# Patient Record
Sex: MALE | Race: WHITE | HISPANIC OR LATINO | URBAN - METROPOLITAN AREA
[De-identification: names, ages, dates, MRNs, and addresses within clinical notes are randomized per-mention and may not be internally consistent; named-entity substitution may affect disease eponyms.]

---

## 2017-05-09 ENCOUNTER — APPOINTMENT (RX ONLY)
Dept: URBAN - METROPOLITAN AREA CLINIC 41 | Facility: CLINIC | Age: 67
Setting detail: DERMATOLOGY
End: 2017-05-09

## 2017-05-09 DIAGNOSIS — D485 NEOPLASM OF UNCERTAIN BEHAVIOR OF SKIN: ICD-10-CM

## 2017-05-09 DIAGNOSIS — Z85.820 PERSONAL HISTORY OF MALIGNANT MELANOMA OF SKIN: ICD-10-CM

## 2017-05-09 PROBLEM — D48.5 NEOPLASM OF UNCERTAIN BEHAVIOR OF SKIN: Status: ACTIVE | Noted: 2017-05-09

## 2017-05-09 PROCEDURE — 11100: CPT

## 2017-05-09 PROCEDURE — ? COUNSELING

## 2017-05-09 PROCEDURE — ? BIOPSY BY SHAVE METHOD

## 2017-05-09 PROCEDURE — 99214 OFFICE O/P EST MOD 30 MIN: CPT | Mod: 25

## 2017-05-09 ASSESSMENT — LOCATION SIMPLE DESCRIPTION DERM
LOCATION SIMPLE: LEFT TEMPLE
LOCATION SIMPLE: ABDOMEN

## 2017-05-09 ASSESSMENT — LOCATION DETAILED DESCRIPTION DERM
LOCATION DETAILED: LEFT CENTRAL TEMPLE
LOCATION DETAILED: RIGHT LATERAL ABDOMEN

## 2017-05-09 ASSESSMENT — LOCATION ZONE DERM
LOCATION ZONE: TRUNK
LOCATION ZONE: FACE

## 2017-05-09 NOTE — PROCEDURE: BIOPSY BY SHAVE METHOD
Silver Nitrate Text: The wound bed was treated with silver nitrate after the biopsy was performed.
Bill [id number] For Specimen Handling/Conveyance To Laboratory?: no
Post-Care Instructions: Post care instructions were reviewed.
Lab Facility: [id number]
X Size Of Lesion In Cm: 0
Anesthesia Type: 1% lidocaine with epinephrine
Wound Care: Vaseline
Consent: Verbal consent was obtained and risks were reviewed including but not limited to scarring, infection, bleeding, scabbing and incomplete removal.
Hemostasis: Electrocautery
Cryotherapy Text: The wound bed was treated with cryotherapy after the biopsy was performed.
Electrodesiccation And Curettage Text: The wound bed was treated with electrodesiccation and curettage after the biopsy was performed.
Render Post-Care Instructions In Note?: yes
Type Of Destruction Used: Curettage
Biopsy Type: H and E
Biopsy Method: Double edge Personna blades
Size Of Lesion In Cm: 0.9
Body Location Override (Optional - Billing Will Still Be Based On Selected Body Map Location If Applicable): Left temple
Lab: [id number]
Billing Type: Third-Party Bill
Dressing: bandage
Notification Instructions: Patient will be notified of biopsy results, but was instructed to call if not contacted within two weeks.
Electrodesiccation Text: The wound bed was treated with electrodesiccation after the biopsy was performed.
Detail Level: Detailed
Anesthesia Volume In Cc: 0.5

## 2018-05-14 ENCOUNTER — APPOINTMENT (RX ONLY)
Dept: URBAN - METROPOLITAN AREA CLINIC 41 | Facility: CLINIC | Age: 68
Setting detail: DERMATOLOGY
End: 2018-05-14

## 2018-05-14 DIAGNOSIS — L82.1 OTHER SEBORRHEIC KERATOSIS: ICD-10-CM

## 2018-05-14 DIAGNOSIS — Z85.820 PERSONAL HISTORY OF MALIGNANT MELANOMA OF SKIN: ICD-10-CM

## 2018-05-14 DIAGNOSIS — L57.0 ACTINIC KERATOSIS: ICD-10-CM

## 2018-05-14 DIAGNOSIS — Z85.828 PERSONAL HISTORY OF OTHER MALIGNANT NEOPLASM OF SKIN: ICD-10-CM

## 2018-05-14 PROCEDURE — ? LIQUID NITROGEN

## 2018-05-14 PROCEDURE — 99214 OFFICE O/P EST MOD 30 MIN: CPT | Mod: 25

## 2018-05-14 PROCEDURE — 17000 DESTRUCT PREMALG LESION: CPT

## 2018-05-14 PROCEDURE — ? COUNSELING

## 2018-05-14 ASSESSMENT — LOCATION SIMPLE DESCRIPTION DERM
LOCATION SIMPLE: CHEST
LOCATION SIMPLE: LEFT EAR
LOCATION SIMPLE: RIGHT TEMPLE
LOCATION SIMPLE: ABDOMEN
LOCATION SIMPLE: RIGHT FOREHEAD

## 2018-05-14 ASSESSMENT — LOCATION ZONE DERM
LOCATION ZONE: EAR
LOCATION ZONE: FACE
LOCATION ZONE: TRUNK

## 2018-05-14 ASSESSMENT — LOCATION DETAILED DESCRIPTION DERM
LOCATION DETAILED: RIGHT CENTRAL TEMPLE
LOCATION DETAILED: RIGHT SUPERIOR FOREHEAD
LOCATION DETAILED: RIGHT LATERAL ABDOMEN
LOCATION DETAILED: RIGHT MEDIAL INFERIOR CHEST
LOCATION DETAILED: LEFT INFERIOR HELIX

## 2018-05-14 NOTE — PROCEDURE: LIQUID NITROGEN
Number Of Freeze-Thaw Cycles: 2 freeze-thaw cycles
Post-Care Instructions: The treatment site will redden, and this will be followed quickly by swelling and blistering. The burning sensation usually subsides promptly, but the patient may experience tenderness as long as 3 days. The patient may take Aspirin, Ibuprofen or Tylenol if needed for discomfort. The patient need not limit activities except for strenuous exercise such as gym classes when the growths are on the feet. Keep the area clean, you may wash the area with soap and water. Bandaging is not necessary, but may be done. You may also puncture a large blister to relieve pressure. Some growths will not be eliminated by one treatment, and will require additional treatment.
Render Post-Care Instructions In Note?: no
Detail Level: Detailed
Consent: Patient's verbal consent is given. Discussed possibility of redness, swelling, blistering and pain. Discussed possibility of hyper and hypopigmentation. Patient is aware treatment failure is also a possibility. Advised return to clinic if not resolved in a month.
Duration Of Freeze Thaw-Cycle (Seconds): 5

## 2019-05-14 ENCOUNTER — APPOINTMENT (RX ONLY)
Dept: URBAN - METROPOLITAN AREA CLINIC 41 | Facility: CLINIC | Age: 69
Setting detail: DERMATOLOGY
End: 2019-05-14

## 2019-05-14 DIAGNOSIS — D18.0 HEMANGIOMA: ICD-10-CM

## 2019-05-14 DIAGNOSIS — L82.1 OTHER SEBORRHEIC KERATOSIS: ICD-10-CM

## 2019-05-14 DIAGNOSIS — L82.0 INFLAMED SEBORRHEIC KERATOSIS: ICD-10-CM

## 2019-05-14 DIAGNOSIS — Z85.828 PERSONAL HISTORY OF OTHER MALIGNANT NEOPLASM OF SKIN: ICD-10-CM

## 2019-05-14 DIAGNOSIS — Z85.820 PERSONAL HISTORY OF MALIGNANT MELANOMA OF SKIN: ICD-10-CM

## 2019-05-14 PROBLEM — D18.01 HEMANGIOMA OF SKIN AND SUBCUTANEOUS TISSUE: Status: ACTIVE | Noted: 2019-05-14

## 2019-05-14 PROCEDURE — ? COUNSELING

## 2019-05-14 PROCEDURE — 17110 DESTRUCTION B9 LES UP TO 14: CPT

## 2019-05-14 PROCEDURE — ? LIQUID NITROGEN

## 2019-05-14 PROCEDURE — 99214 OFFICE O/P EST MOD 30 MIN: CPT | Mod: 25

## 2019-05-14 ASSESSMENT — LOCATION SIMPLE DESCRIPTION DERM
LOCATION SIMPLE: RIGHT UPPER ARM
LOCATION SIMPLE: ABDOMEN
LOCATION SIMPLE: RIGHT TEMPLE
LOCATION SIMPLE: CHEST
LOCATION SIMPLE: LEFT CALF
LOCATION SIMPLE: RIGHT CALF

## 2019-05-14 ASSESSMENT — LOCATION ZONE DERM
LOCATION ZONE: TRUNK
LOCATION ZONE: FACE
LOCATION ZONE: LEG
LOCATION ZONE: ARM

## 2019-05-14 ASSESSMENT — LOCATION DETAILED DESCRIPTION DERM
LOCATION DETAILED: RIGHT PROXIMAL CALF
LOCATION DETAILED: RIGHT LATERAL PROXIMAL UPPER ARM
LOCATION DETAILED: LEFT DISTAL CALF
LOCATION DETAILED: RIGHT LATERAL ABDOMEN
LOCATION DETAILED: RIGHT MEDIAL INFERIOR CHEST
LOCATION DETAILED: RIGHT CENTRAL TEMPLE

## 2019-05-14 NOTE — PROCEDURE: LIQUID NITROGEN
Duration Of Freeze Thaw-Cycle (Seconds): 5-10
Render Note In Bullet Format When Appropriate: No
Include Z78.9 (Other Specified Conditions Influencing Health Status) As An Associated Diagnosis?: Yes
Number Of Freeze-Thaw Cycles: 2 freeze-thaw cycles
Consent: Patient gives verbal consent. Procedure and risks are reviewed including swelling, blistering, burning, blistering, hyper and hypopigmentation as well as possibility of treatment failure.
Post-Care Instructions: The treatment site will redden and this will be followed quickly by swelling and blistering. The burning sensation usually subsides promptly, although some tenderness of the area may last as long as three days. The patient may take aspirin, Tylenol or ibuprofen if needed for discomfort. The patient need not limit activities except for strenuous exercise when the growths are on the feet. Keep the area clean. You may wash the areas with soap and water. Bandaging is not necessary, but may be done. You may also puncture a large blister to relieve pressure. Advised RTC if not resolved in 1 month.
Medical Necessity Clause: The following procedure was performed due to:
Medical Necessity Information: It is in your best interest to select a reason for this procedure from the list below. All of these items fulfill various CMS LCD requirements except the new and changing color options.
Detail Level: Detailed

## 2019-08-13 ENCOUNTER — APPOINTMENT (RX ONLY)
Dept: URBAN - METROPOLITAN AREA CLINIC 41 | Facility: CLINIC | Age: 69
Setting detail: DERMATOLOGY
End: 2019-08-13

## 2019-08-13 DIAGNOSIS — D485 NEOPLASM OF UNCERTAIN BEHAVIOR OF SKIN: ICD-10-CM

## 2019-08-13 DIAGNOSIS — L82.0 INFLAMED SEBORRHEIC KERATOSIS: ICD-10-CM

## 2019-08-13 DIAGNOSIS — L82.1 OTHER SEBORRHEIC KERATOSIS: ICD-10-CM

## 2019-08-13 PROBLEM — D48.5 NEOPLASM OF UNCERTAIN BEHAVIOR OF SKIN: Status: ACTIVE | Noted: 2019-08-13

## 2019-08-13 PROCEDURE — ? LIQUID NITROGEN

## 2019-08-13 PROCEDURE — ? COUNSELING

## 2019-08-13 PROCEDURE — 17110 DESTRUCTION B9 LES UP TO 14: CPT

## 2019-08-13 PROCEDURE — 99213 OFFICE O/P EST LOW 20 MIN: CPT | Mod: 25

## 2019-08-13 PROCEDURE — ? OTHER

## 2019-08-13 ASSESSMENT — LOCATION ZONE DERM
LOCATION ZONE: EAR
LOCATION ZONE: ARM

## 2019-08-13 ASSESSMENT — LOCATION DETAILED DESCRIPTION DERM
LOCATION DETAILED: RIGHT DISTAL POSTERIOR UPPER ARM
LOCATION DETAILED: LEFT ANTERIOR PROXIMAL UPPER ARM
LOCATION DETAILED: RIGHT SUPERIOR CRUS OF ANTIHELIX

## 2019-08-13 ASSESSMENT — LOCATION SIMPLE DESCRIPTION DERM
LOCATION SIMPLE: LEFT UPPER ARM
LOCATION SIMPLE: RIGHT UPPER ARM
LOCATION SIMPLE: RIGHT EAR

## 2019-08-13 NOTE — PROCEDURE: OTHER
Detail Level: Detailed
Other (Free Text): Discussed if not resolved in one month return to clinic for possible biopsy to rule out SCCA
Note Text (......Xxx Chief Complaint.): This diagnosis correlates with the

## 2019-08-13 NOTE — PROCEDURE: LIQUID NITROGEN
Include Z78.9 (Other Specified Conditions Influencing Health Status) As An Associated Diagnosis?: Yes
Consent: Patient gives verbal consent. Procedure and risks are reviewed including swelling, blistering, burning, blistering, hyper and hypopigmentation as well as possibility of treatment failure.
Number Of Freeze-Thaw Cycles: 2 freeze-thaw cycles
Medical Necessity Information: It is in your best interest to select a reason for this procedure from the list below. All of these items fulfill various CMS LCD requirements except the new and changing color options.
Duration Of Freeze Thaw-Cycle (Seconds): 5-10
Post-Care Instructions: The treatment site will redden and this will be followed quickly by swelling and blistering. The burning sensation usually subsides promptly, although some tenderness of the area may last as long as three days. The patient may take aspirin, Tylenol or ibuprofen if needed for discomfort. The patient need not limit activities except for strenuous exercise when the growths are on the feet. Keep the area clean. You may wash the areas with soap and water. Bandaging is not necessary, but may be done. You may also puncture a large blister to relieve pressure. Advised RTC if not resolved in 1 month.
Add 52 Modifier (Optional): no
Medical Necessity Clause: The following procedure was performed due to:
Detail Level: Detailed

## 2019-09-17 ENCOUNTER — APPOINTMENT (RX ONLY)
Dept: URBAN - METROPOLITAN AREA CLINIC 41 | Facility: CLINIC | Age: 69
Setting detail: DERMATOLOGY
End: 2019-09-17

## 2019-09-17 DIAGNOSIS — L82.1 OTHER SEBORRHEIC KERATOSIS: ICD-10-CM

## 2019-09-17 PROCEDURE — 99213 OFFICE O/P EST LOW 20 MIN: CPT

## 2019-09-17 PROCEDURE — ? OTHER

## 2019-09-17 PROCEDURE — ? COUNSELING

## 2019-09-17 ASSESSMENT — LOCATION DETAILED DESCRIPTION DERM
LOCATION DETAILED: RIGHT MID TEMPLE
LOCATION DETAILED: RIGHT DISTAL POSTERIOR UPPER ARM

## 2019-09-17 ASSESSMENT — LOCATION ZONE DERM
LOCATION ZONE: ARM
LOCATION ZONE: FACE

## 2019-09-17 ASSESSMENT — LOCATION SIMPLE DESCRIPTION DERM
LOCATION SIMPLE: RIGHT TEMPLE
LOCATION SIMPLE: RIGHT UPPER ARM

## 2019-09-17 NOTE — PROCEDURE: OTHER
Detail Level: Detailed
Note Text (......Xxx Chief Complaint.): This diagnosis correlates with the
Other (Free Text): Treated with LN2

## 2020-04-22 ENCOUNTER — APPOINTMENT (RX ONLY)
Dept: URBAN - METROPOLITAN AREA CLINIC 41 | Facility: CLINIC | Age: 70
Setting detail: DERMATOLOGY
End: 2020-04-22

## 2020-04-22 DIAGNOSIS — L82.1 OTHER SEBORRHEIC KERATOSIS: ICD-10-CM

## 2020-04-22 DIAGNOSIS — Z85.820 PERSONAL HISTORY OF MALIGNANT MELANOMA OF SKIN: ICD-10-CM

## 2020-04-22 DIAGNOSIS — Z85.828 PERSONAL HISTORY OF OTHER MALIGNANT NEOPLASM OF SKIN: ICD-10-CM

## 2020-04-22 PROCEDURE — 99213 OFFICE O/P EST LOW 20 MIN: CPT | Mod: 95

## 2020-04-22 PROCEDURE — ? COUNSELING

## 2020-04-22 ASSESSMENT — LOCATION DETAILED DESCRIPTION DERM
LOCATION DETAILED: RIGHT LATERAL ABDOMEN
LOCATION DETAILED: LEFT RIB CAGE
LOCATION DETAILED: RIGHT CENTRAL TEMPLE

## 2020-04-22 ASSESSMENT — LOCATION ZONE DERM
LOCATION ZONE: FACE
LOCATION ZONE: TRUNK

## 2020-04-22 ASSESSMENT — LOCATION SIMPLE DESCRIPTION DERM
LOCATION SIMPLE: RIGHT TEMPLE
LOCATION SIMPLE: ABDOMEN

## 2020-10-28 ENCOUNTER — APPOINTMENT (RX ONLY)
Dept: URBAN - METROPOLITAN AREA CLINIC 41 | Facility: CLINIC | Age: 70
Setting detail: DERMATOLOGY
End: 2020-10-28

## 2020-10-28 DIAGNOSIS — Z85.820 PERSONAL HISTORY OF MALIGNANT MELANOMA OF SKIN: ICD-10-CM

## 2020-10-28 DIAGNOSIS — Z85.828 PERSONAL HISTORY OF OTHER MALIGNANT NEOPLASM OF SKIN: ICD-10-CM

## 2020-10-28 DIAGNOSIS — L82.1 OTHER SEBORRHEIC KERATOSIS: ICD-10-CM

## 2020-10-28 DIAGNOSIS — L57.0 ACTINIC KERATOSIS: ICD-10-CM

## 2020-10-28 DIAGNOSIS — Z71.89 OTHER SPECIFIED COUNSELING: ICD-10-CM

## 2020-10-28 PROCEDURE — ? COUNSELING

## 2020-10-28 PROCEDURE — ? LIQUID NITROGEN

## 2020-10-28 PROCEDURE — 99214 OFFICE O/P EST MOD 30 MIN: CPT | Mod: 25

## 2020-10-28 PROCEDURE — 17003 DESTRUCT PREMALG LES 2-14: CPT

## 2020-10-28 PROCEDURE — 17000 DESTRUCT PREMALG LESION: CPT

## 2020-10-28 ASSESSMENT — LOCATION DETAILED DESCRIPTION DERM
LOCATION DETAILED: LEFT DISTAL CALF
LOCATION DETAILED: RIGHT SCAPHA
LOCATION DETAILED: LEFT SUPERIOR PARIETAL SCALP
LOCATION DETAILED: RIGHT CENTRAL MALAR CHEEK
LOCATION DETAILED: RIGHT INFERIOR FOREHEAD
LOCATION DETAILED: RIGHT INFERIOR CENTRAL MALAR CHEEK
LOCATION DETAILED: LEFT LATERAL ABDOMEN
LOCATION DETAILED: RIGHT CENTRAL PARIETAL SCALP
LOCATION DETAILED: LEFT SUPERIOR HELIX
LOCATION DETAILED: RIGHT MEDIAL TEMPLE

## 2020-10-28 ASSESSMENT — LOCATION SIMPLE DESCRIPTION DERM
LOCATION SIMPLE: LEFT CALF
LOCATION SIMPLE: SCALP
LOCATION SIMPLE: RIGHT CHEEK
LOCATION SIMPLE: LEFT EAR
LOCATION SIMPLE: RIGHT TEMPLE
LOCATION SIMPLE: RIGHT FOREHEAD
LOCATION SIMPLE: RIGHT EAR
LOCATION SIMPLE: ABDOMEN

## 2020-10-28 ASSESSMENT — LOCATION ZONE DERM
LOCATION ZONE: TRUNK
LOCATION ZONE: EAR
LOCATION ZONE: LEG
LOCATION ZONE: SCALP
LOCATION ZONE: FACE

## 2020-10-28 NOTE — PROCEDURE: LIQUID NITROGEN
Detail Level: Detailed
Number Of Freeze-Thaw Cycles: 2 freeze-thaw cycles
Duration Of Freeze Thaw-Cycle (Seconds): 2
Render Post-Care Instructions In Note?: no
Post-Care Instructions: I reviewed with the patient in detail post-care instructions. Patient is to wear sunprotection, and avoid picking at any of the treated lesions. Pt may apply Vaseline to crusted or scabbing areas.
Consent: The patient's consent was obtained including but not limited to risks of crusting, scabbing, blistering, scarring, darker or lighter pigmentary change, recurrence, incomplete removal and infection.

## 2021-05-12 ENCOUNTER — APPOINTMENT (RX ONLY)
Dept: URBAN - METROPOLITAN AREA CLINIC 41 | Facility: CLINIC | Age: 71
Setting detail: DERMATOLOGY
End: 2021-05-12

## 2021-05-12 DIAGNOSIS — L57.0 ACTINIC KERATOSIS: ICD-10-CM

## 2021-05-12 DIAGNOSIS — L82.1 OTHER SEBORRHEIC KERATOSIS: ICD-10-CM

## 2021-05-12 DIAGNOSIS — Z85.820 PERSONAL HISTORY OF MALIGNANT MELANOMA OF SKIN: ICD-10-CM

## 2021-05-12 DIAGNOSIS — L90.5 SCAR CONDITIONS AND FIBROSIS OF SKIN: ICD-10-CM

## 2021-05-12 DIAGNOSIS — L57.8 OTHER SKIN CHANGES DUE TO CHRONIC EXPOSURE TO NONIONIZING RADIATION: ICD-10-CM

## 2021-05-12 PROCEDURE — 17003 DESTRUCT PREMALG LES 2-14: CPT

## 2021-05-12 PROCEDURE — 17000 DESTRUCT PREMALG LESION: CPT

## 2021-05-12 PROCEDURE — ? LIQUID NITROGEN

## 2021-05-12 PROCEDURE — ? COUNSELING

## 2021-05-12 PROCEDURE — 99213 OFFICE O/P EST LOW 20 MIN: CPT | Mod: 25

## 2021-05-12 ASSESSMENT — LOCATION ZONE DERM
LOCATION ZONE: TRUNK
LOCATION ZONE: TRUNK
LOCATION ZONE: EAR

## 2021-05-12 ASSESSMENT — LOCATION DETAILED DESCRIPTION DERM
LOCATION DETAILED: RIGHT TRIANGULAR FOSSA
LOCATION DETAILED: RIGHT SCAPHA
LOCATION DETAILED: PERIUMBILICAL SKIN
LOCATION DETAILED: SUPERIOR LUMBAR SPINE

## 2021-05-12 ASSESSMENT — LOCATION SIMPLE DESCRIPTION DERM
LOCATION SIMPLE: LOWER BACK
LOCATION SIMPLE: ABDOMEN
LOCATION SIMPLE: RIGHT EAR

## 2021-05-12 NOTE — PROCEDURE: LIQUID NITROGEN
Post-Care Instructions: I reviewed with the patient in detail post-care instructions. Patient is to wear sunprotection, and avoid picking at any of the treated lesions. Pt may apply Vaseline to crusted or scabbing areas.
Render Post-Care Instructions In Note?: no
Detail Level: Detailed
Consent: The patient's consent was obtained including but not limited to risks of crusting, scabbing, blistering, scarring, darker or lighter pigmentary change, recurrence, incomplete removal and infection.
Duration Of Freeze Thaw-Cycle (Seconds): 7
Number Of Freeze-Thaw Cycles: 1 freeze-thaw cycle

## 2021-11-10 ENCOUNTER — APPOINTMENT (RX ONLY)
Dept: URBAN - METROPOLITAN AREA CLINIC 41 | Facility: CLINIC | Age: 71
Setting detail: DERMATOLOGY
End: 2021-11-10

## 2021-11-10 DIAGNOSIS — L57.8 OTHER SKIN CHANGES DUE TO CHRONIC EXPOSURE TO NONIONIZING RADIATION: ICD-10-CM

## 2021-11-10 DIAGNOSIS — L82.1 OTHER SEBORRHEIC KERATOSIS: ICD-10-CM

## 2021-11-10 DIAGNOSIS — L57.0 ACTINIC KERATOSIS: ICD-10-CM

## 2021-11-10 DIAGNOSIS — L90.5 SCAR CONDITIONS AND FIBROSIS OF SKIN: ICD-10-CM

## 2021-11-10 DIAGNOSIS — Z85.820 PERSONAL HISTORY OF MALIGNANT MELANOMA OF SKIN: ICD-10-CM

## 2021-11-10 PROCEDURE — 17003 DESTRUCT PREMALG LES 2-14: CPT

## 2021-11-10 PROCEDURE — 17000 DESTRUCT PREMALG LESION: CPT

## 2021-11-10 PROCEDURE — ? LIQUID NITROGEN

## 2021-11-10 PROCEDURE — 99213 OFFICE O/P EST LOW 20 MIN: CPT | Mod: 25

## 2021-11-10 PROCEDURE — ? COUNSELING

## 2021-11-10 ASSESSMENT — LOCATION ZONE DERM
LOCATION ZONE: SCALP
LOCATION ZONE: EAR
LOCATION ZONE: TRUNK
LOCATION ZONE: FACE

## 2021-11-10 ASSESSMENT — LOCATION SIMPLE DESCRIPTION DERM
LOCATION SIMPLE: RIGHT CHEEK
LOCATION SIMPLE: SCALP
LOCATION SIMPLE: LEFT UPPER BACK
LOCATION SIMPLE: RIGHT EAR
LOCATION SIMPLE: LEFT EAR
LOCATION SIMPLE: LEFT FOREHEAD

## 2021-11-10 ASSESSMENT — LOCATION DETAILED DESCRIPTION DERM
LOCATION DETAILED: LEFT CENTRAL PARIETAL SCALP
LOCATION DETAILED: RIGHT SUPERIOR CENTRAL MALAR CHEEK
LOCATION DETAILED: RIGHT INFERIOR POSTERIOR HELIX
LOCATION DETAILED: RIGHT SUPERIOR PARIETAL SCALP
LOCATION DETAILED: LEFT FOREHEAD
LOCATION DETAILED: LEFT INFERIOR LATERAL UPPER BACK
LOCATION DETAILED: LEFT CYMBA CONCHA
LOCATION DETAILED: LEFT MEDIAL FOREHEAD

## 2021-11-10 NOTE — PROCEDURE: LIQUID NITROGEN
Consent: The patient's consent was obtained including but not limited to risks of crusting, scabbing, blistering, scarring, darker or lighter pigmentary change, recurrence, incomplete removal and infection.
Render Note In Bullet Format When Appropriate: No
Duration Of Freeze Thaw-Cycle (Seconds): 5
Application Tool (Optional): Liquid Nitrogen Sprayer
Post-Care Instructions: I reviewed with the patient in detail post-care instructions. Patient is to wear sunprotection, and avoid picking at any of the treated lesions. Pt may apply Vaseline to crusted or scabbing areas.
Show Aperture Variable?: Yes
Number Of Freeze-Thaw Cycles: 2 freeze-thaw cycles
Detail Level: Simple

## 2022-06-08 ENCOUNTER — APPOINTMENT (RX ONLY)
Dept: URBAN - METROPOLITAN AREA CLINIC 41 | Facility: CLINIC | Age: 72
Setting detail: DERMATOLOGY
End: 2022-06-08

## 2022-06-08 DIAGNOSIS — D485 NEOPLASM OF UNCERTAIN BEHAVIOR OF SKIN: ICD-10-CM

## 2022-06-08 PROBLEM — D23.39 OTHER BENIGN NEOPLASM OF SKIN OF OTHER PARTS OF FACE: Status: ACTIVE | Noted: 2022-06-08

## 2022-06-08 PROBLEM — D48.5 NEOPLASM OF UNCERTAIN BEHAVIOR OF SKIN: Status: ACTIVE | Noted: 2022-06-08

## 2022-06-08 PROCEDURE — 69100 BIOPSY OF EXTERNAL EAR: CPT

## 2022-06-08 PROCEDURE — ? COUNSELING

## 2022-06-08 PROCEDURE — 99212 OFFICE O/P EST SF 10 MIN: CPT | Mod: 25

## 2022-06-08 PROCEDURE — ? BIOPSY BY SHAVE METHOD

## 2022-06-08 ASSESSMENT — LOCATION DETAILED DESCRIPTION DERM: LOCATION DETAILED: RIGHT TRIANGULAR FOSSA

## 2022-06-08 ASSESSMENT — LOCATION ZONE DERM: LOCATION ZONE: EAR

## 2022-06-08 ASSESSMENT — LOCATION SIMPLE DESCRIPTION DERM: LOCATION SIMPLE: RIGHT EAR

## 2022-06-08 NOTE — PROCEDURE: COUNSELING
Topical Retinoids Recommendations: Helps with cell turnover and fine line and wrinkles
Detail Level: Zone
Sunscreen Recommendations: Continual photo protection with sun screen and photo protective clothing.

## 2022-06-08 NOTE — PROCEDURE: BIOPSY BY SHAVE METHOD
Detail Level: Detailed
Depth Of Biopsy: dermis
Was A Bandage Applied: Yes
Size Of Lesion In Cm: 0.3
X Size Of Lesion In Cm: 0
Biopsy Type: H and E
Biopsy Method: Dermablade
Anesthesia Type: 1% lidocaine with epinephrine and a 1:10 solution of 8.4% sodium bicarbonate
Anesthesia Volume In Cc: 0.5
Hemostasis: Nola's
Wound Care: Vaseline
Dressing: Band-Aid
Destruction After The Procedure: No
Type Of Destruction Used: Curettage
Curettage Text: The wound bed was treated with curettage after the biopsy was performed.
Cryotherapy Text: The wound bed was treated with cryotherapy after the biopsy was performed.
Electrodesiccation Text: The wound bed was treated with electrodesiccation after the biopsy was performed.
Electrodesiccation And Curettage Text: The wound bed was treated with electrodesiccation and curettage after the biopsy was performed.
Silver Nitrate Text: The wound bed was treated with silver nitrate after the biopsy was performed.
Lab: [id number]
Consent was obtained and risks were reviewed including but not limited to scarring, infection, bleeding, scabbing, incomplete removal, nerve damage and allergy to anesthesia.
Post-Care Instructions: I reviewed with the patient in detail post-care instructions. Patient is to keep the biopsy site dry overnight, and then apply bacitracin twice daily until healed. Patient may apply hydrogen peroxide soaks to remove any crusting.
Notification Instructions: Patient will be notified of biopsy results. However, patient instructed to call the office if not contacted within 2 weeks.
Billing Type: Third-Party Bill
Information: Selecting Yes will display possible errors in your note based on the variables you have selected. This validation is only offered as a suggestion for you. PLEASE NOTE THAT THE VALIDATION TEXT WILL BE REMOVED WHEN YOU FINALIZE YOUR NOTE. IF YOU WANT TO FAX A PRELIMINARY NOTE YOU WILL NEED TO TOGGLE THIS TO 'NO' IF YOU DO NOT WANT IT IN YOUR FAXED NOTE.

## 2022-08-17 ENCOUNTER — APPOINTMENT (RX ONLY)
Dept: URBAN - METROPOLITAN AREA CLINIC 41 | Facility: CLINIC | Age: 72
Setting detail: DERMATOLOGY
End: 2022-08-17

## 2022-08-17 ENCOUNTER — APPOINTMENT (RX ONLY)
Dept: URBAN - METROPOLITAN AREA CLINIC 9 | Facility: CLINIC | Age: 72
Setting detail: DERMATOLOGY
End: 2022-08-17

## 2022-08-17 VITALS
SYSTOLIC BLOOD PRESSURE: 130 MMHG | WEIGHT: 210 LBS | DIASTOLIC BLOOD PRESSURE: 75 MMHG | HEART RATE: 69 BPM | HEIGHT: 72 IN

## 2022-08-17 VITALS
HEART RATE: 60 BPM | DIASTOLIC BLOOD PRESSURE: 80 MMHG | WEIGHT: 210 LBS | SYSTOLIC BLOOD PRESSURE: 135 MMHG | HEIGHT: 72 IN

## 2022-08-17 PROBLEM — C44.222 SQUAMOUS CELL CARCINOMA OF SKIN OF RIGHT EAR AND EXTERNAL AURICULAR CANAL: Status: ACTIVE | Noted: 2022-08-17

## 2022-08-17 PROBLEM — C44.92 SQUAMOUS CELL CARCINOMA OF SKIN, UNSPECIFIED: Status: ACTIVE | Noted: 2022-08-17

## 2022-08-17 PROCEDURE — ? REPAIR NOTE

## 2022-08-17 PROCEDURE — ? MOHS SURGERY

## 2022-08-17 PROCEDURE — ? PATIENT SPECIFIC COUNSELING

## 2022-08-17 PROCEDURE — 15260 FTH/GFT FR N/E/E/L 20 SQCM/<: CPT

## 2022-08-17 PROCEDURE — ? COUNSELING

## 2022-08-17 PROCEDURE — ? CONSULTATION FOR MOHS SURGERY

## 2022-08-17 PROCEDURE — 99214 OFFICE O/P EST MOD 30 MIN: CPT | Mod: 25

## 2022-08-17 PROCEDURE — 17311 MOHS 1 STAGE H/N/HF/G: CPT

## 2022-08-17 PROCEDURE — 17312 MOHS ADDL STAGE: CPT

## 2022-08-17 NOTE — PROCEDURE: CONSULTATION FOR MOHS SURGERY
Detail Level: Detailed
Body Location Override (Optional - Billing Will Still Be Based On Selected Body Map Location If Applicable): right triangular fossa
Size Of Lesion: 1.3
X Size Of Lesion In Cm (Optional): 0.8
Name Of The Referring Provider For Procedure: NURA Jackson PA-C
Incorporate Mauc In Note: Yes

## 2022-08-17 NOTE — PROCEDURE: MOHS SURGERY
Body Location Override (Optional - Billing Will Still Be Based On Selected Body Map Location If Applicable): right triangular fossa
Patient Name (Optional- Will Render 'the Patient' If Blank): Francesco Reagan
Date Of Previous Biopsy (Optional): 6/8/22
Previous Accession (Optional): [id number]
Biopsy Photograph Reviewed: Yes
Referring Physician (Optional): NURA Jackson PA-C
Consent Type: Consent 1 (Standard)
Eye Shield Used: No
Surgeon Performing Repair (Optional): NURA Ladd MD
Initial Size Of Lesion: 1.3
X Size Of Lesion In Cm (Optional): 0.8
Number Of Stages: 2
Repair Type: Referred to ASC for closure
Oculoplastic Surgeon Procedure Text (A): After obtaining clear surgical margins the patient was sent to oculoplastics for surgical repair.  The patient understands they will receive post-surgical care and follow-up from the referring physician's office.
Otolaryngologist Procedure Text (A): After obtaining clear surgical margins the patient was sent to otolaryngology for surgical repair.  The patient understands they will receive post-surgical care and follow-up from the referring physician's office.
Plastic Surgeon (A): Dr. Stephanie Mistry
Plastic Surgeon Procedure Text (A): After obtaining clear surgical margins the patient was sent to plastics for surgical repair.  The patient understands they will receive post-surgical care and follow-up from the referring physician's office.
Mid-Level Procedure Text (A): After obtaining clear surgical margins the patient was sent to a mid-level provider for surgical repair.  The patient understands they will receive post-surgical care and follow-up from the mid-level provider.
Provider Procedure Text (A): After obtaining clear surgical margins the defect was repaired by another provider.
Asc Procedure Text (A): After obtaining clear surgical margins the patient was sent to an ASC for surgical repair.  The patient understands they will receive post-surgical care and follow-up from the ASC physician.
Simple / Intermediate / Complex Repair - Final Wound Length In Cm: 0
Suturegard Retention Suture: 2-0 Nylon
Retention Suture Bite Size: 3 mm
Length To Time In Minutes Device Was In Place: 10
Number Of Hemigard Strips Per Side: 1
Undermining Type: Entire Wound
Debridement Text: The wound edges were debrided prior to proceeding with the closure to facilitate wound healing.
Helical Rim Text: The closure involved the helical rim.
Vermilion Border Text: The closure involved the vermilion border.
Nostril Rim Text: The closure involved the nostril rim.
Retention Suture Text: Retention sutures were placed to support the closure and prevent dehiscence.
Area H Indication Text: Tumors in this location are included in Area H (eyelids, eyebrows, nose, lips, chin, ear, pre-auricular, post-auricular, temple, genitalia, hands, feet, ankles and areola).  Tissue conservation is critical in these anatomic locations.
Area M Indication Text: Tumors in this location are included in Area M (cheek, forehead, scalp, neck, jawline and pretibial skin).  Mohs surgery is indicated for tumors in these anatomic locations.
Area L Indication Text: Tumors in this location are included in Area L (trunk and extremities).  Mohs surgery is indicated for larger tumors, or tumors with aggressive histologic features, in these anatomic locations.
Tumor Debulked?: curette
Special Stains Stage 1 - Results: Base On Clearance Noted Above
Stage 2: Additional Anesthesia Type: 1% lidocaine with 1:200,000 epinephrine
Stage 3: Additional Anesthesia Type: 1% lidocaine with epinephrine
Staging Info: By selecting yes to the question above you will include information on AJCC 8 tumor staging in your Mohs note. Information on tumor staging will be automatically added for SCCs on the head and neck. AJCC 8 includes tumor size, tumor depth, perineural involvement and bone invasion.
Tumor Depth: Less than 6mm from granular layer and no invasion beyond the subcutaneous fat
Was The Patient On Physician Recommended Anticoagulation Therapy?: Please Select the Appropriate Response
Medical Necessity Statement: Based on my medical judgement, Mohs surgery is the most appropriate treatment for this cancer compared to other treatments.
Alternatives Discussed Intro (Do Not Add Period): I discussed alternative treatments to Mohs surgery and specifically discussed the risks and benefits of
Consent 1/Introductory Paragraph: Patient H&P reviewed and updated. Patient cleared for surgery.  The rationale for Mohs was explained to the patient and consent was obtained. The risks, benefits and alternatives to therapy were discussed in detail. Specifically, the risks of infection, scarring, bleeding, prolonged wound healing, incomplete removal, allergy to anesthesia, nerve injury and recurrence were addressed. Prior to the procedure, the treatment site was clearly identified and confirmed by the patient.
Consent 2/Introductory Paragraph: Mohs surgery was explained to the patient and consent was obtained. The risks, benefits and alternatives to therapy were discussed in detail. Specifically, the risks of infection, scarring, bleeding, prolonged wound healing, incomplete removal, allergy to anesthesia, nerve injury and recurrence were addressed. Prior to the procedure, the treatment site was clearly identified and confirmed by the patient. All components of Universal Protocol/PAUSE Rule completed.
Consent 3/Introductory Paragraph: I gave the patient a chance to ask questions they had about the procedure.  Following this I explained the Mohs procedure and consent was obtained. The risks, benefits and alternatives to therapy were discussed in detail. Specifically, the risks of infection, scarring, bleeding, prolonged wound healing, incomplete removal, allergy to anesthesia, nerve injury and recurrence were addressed. Prior to the procedure, the treatment site was clearly identified and confirmed by the patient. All components of Universal Protocol/PAUSE Rule completed.
Consent (Temporal Branch)/Introductory Paragraph: The rationale for Mohs was explained to the patient and consent was obtained. The risks, benefits and alternatives to therapy were discussed in detail. Specifically, the risks of damage to the temporal branch of the facial nerve, infection, scarring, bleeding, prolonged wound healing, incomplete removal, allergy to anesthesia, and recurrence were addressed. Prior to the procedure, the treatment site was clearly identified and confirmed by the patient. All components of Universal Protocol/PAUSE Rule completed.
Consent (Marginal Mandibular)/Introductory Paragraph: The rationale for Mohs was explained to the patient and consent was obtained. The risks, benefits and alternatives to therapy were discussed in detail. Specifically, the risks of damage to the marginal mandibular branch of the facial nerve, infection, scarring, bleeding, prolonged wound healing, incomplete removal, allergy to anesthesia, and recurrence were addressed. Prior to the procedure, the treatment site was clearly identified and confirmed by the patient. All components of Universal Protocol/PAUSE Rule completed.
Consent (Spinal Accessory)/Introductory Paragraph: The rationale for Mohs was explained to the patient and consent was obtained. The risks, benefits and alternatives to therapy were discussed in detail. Specifically, the risks of damage to the spinal accessory nerve, infection, scarring, bleeding, prolonged wound healing, incomplete removal, allergy to anesthesia, and recurrence were addressed. Prior to the procedure, the treatment site was clearly identified and confirmed by the patient. All components of Universal Protocol/PAUSE Rule completed.
Consent (Near Eyelid Margin)/Introductory Paragraph: The rationale for Mohs was explained to the patient and consent was obtained. The risks, benefits and alternatives to therapy were discussed in detail. Specifically, the risks of ectropion or eyelid deformity, infection, scarring, bleeding, prolonged wound healing, incomplete removal, allergy to anesthesia, nerve injury and recurrence were addressed. Prior to the procedure, the treatment site was clearly identified and confirmed by the patient. All components of Universal Protocol/PAUSE Rule completed.
Consent (Ear)/Introductory Paragraph: The rationale for Mohs was explained to the patient and consent was obtained. The risks, benefits and alternatives to therapy were discussed in detail. Specifically, the risks of ear deformity, infection, scarring, bleeding, prolonged wound healing, incomplete removal, allergy to anesthesia, nerve injury and recurrence were addressed. Prior to the procedure, the treatment site was clearly identified and confirmed by the patient. All components of Universal Protocol/PAUSE Rule completed.
Consent (Nose)/Introductory Paragraph: The rationale for Mohs was explained to the patient and consent was obtained. The risks, benefits and alternatives to therapy were discussed in detail. Specifically, the risks of nasal deformity, changes in the flow of air through the nose, infection, scarring, bleeding, prolonged wound healing, incomplete removal, allergy to anesthesia, nerve injury and recurrence were addressed. Prior to the procedure, the treatment site was clearly identified and confirmed by the patient. All components of Universal Protocol/PAUSE Rule completed.
Consent (Lip)/Introductory Paragraph: The rationale for Mohs was explained to the patient and consent was obtained. The risks, benefits and alternatives to therapy were discussed in detail. Specifically, the risks of lip deformity, changes in the oral aperture, infection, scarring, bleeding, prolonged wound healing, incomplete removal, allergy to anesthesia, nerve injury and recurrence were addressed. Prior to the procedure, the treatment site was clearly identified and confirmed by the patient. All components of Universal Protocol/PAUSE Rule completed.
Consent (Scalp)/Introductory Paragraph: The rationale for Mohs was explained to the patient and consent was obtained. The risks, benefits and alternatives to therapy were discussed in detail. Specifically, the risks of changes in hair growth pattern secondary to repair, infection, scarring, bleeding, prolonged wound healing, incomplete removal, allergy to anesthesia, nerve injury and recurrence were addressed. Prior to the procedure, the treatment site was clearly identified and confirmed by the patient. All components of Universal Protocol/PAUSE Rule completed.
Detail Level: Simple
Postop Diagnosis: same
Hemostasis: Electrocautery
Estimated Blood Loss (Cc): minimal
Brow Lift Text: A midfrontal incision was made medially to the defect to allow access to the tissues just superior to the left eyebrow. Following careful dissection inferiorly in a supraperiosteal plane to the level of the left eyebrow, several 3-0 monocryl sutures were used to resuspend the eyebrow orbicularis oculi muscular unit to the superior frontal bone periosteum. This resulted in an appropriate reapproximation of static eyebrow symmetry and correction of the left brow ptosis.
Deep Sutures: 5-0 Monocryl
Epidermal Closure: running
Suturegard Intro: Intraoperative tissue expansion was performed, utilizing the SUTUREGARD device, in order to reduce wound tension.
Suturegard Body: The suture ends were repeatedly re-tightened and re-clamped to achieve the desired tissue expansion.
Hemigard Intro: Due to skin fragility and wound tension, it was decided to use HEMIGARD adhesive retention suture devices to permit a linear closure. The skin was cleaned and dried for a 6cm distance away from the wound. Excessive hair, if present, was removed to allow for adhesion.
Hemigard Postcare Instructions: The HEMIGARD strips are to remain completely dry for at least 5-7 days.
Donor Site Anesthesia Type: same as repair anesthesia
Closure 2 Information: This tab is for additional flaps and grafts, including complex repair and grafts and complex repair and flaps. You can also specify a different location for the additional defect, if the location is the same you do not need to select a new one. We will insert the automated text for the repair you select below just as we do for solitary flaps and grafts. Please note that at this time if you select a location with a different insurance zone you will need to override the ICD10 and CPT if appropriate.
Closure 3 Information: This tab is for additional flaps and grafts above and beyond our usual structured repairs.  Please note if you enter information here it will not currently bill and you will need to add the billing information manually.
Unna Boot Text: An Unna boot was placed to help immobilize the limb and facilitate more rapid healing.
Home Suture Removal Text: Patient was provided instructions on removing sutures and will remove their sutures at home.  If they have any questions or difficulties they will call the office.
Post-Care Instructions: I reviewed with the patient in detail post-care instructions. Patient is not to engage in any heavy lifting, exercise, or swimming for the next 14 days. Should the patient develop any fevers, chills, bleeding, severe pain patient will contact the office immediately. Patient will follow up with referring physician within 6 months.
Pain Refusal Text: I offered to prescribe pain medication but the patient refused to take this medication.
Mauc Instructions: By selecting yes to the question below the MAUC number will be added into the note.  This will be calculated automatically based on the diagnosis chosen, the size entered, the body zone selected (H,M,L) and the specific indications you chose. You will also have the option to override the Mohs AUC if you disagree with the automatically calculated number and this option is found in the Case Summary tab.
Where Do You Want The Question To Include Opioid Counseling Located?: Case Summary Tab
Eye Protection Verbiage: Before proceeding with the stage, a plastic scleral shield was inserted. The globe was anesthetized with a few drops of 1% lidocaine with 1:100,000 epinephrine. Then, an appropriate sized scleral shield was chosen and coated with lacrilube ointment. The shield was gently inserted and left in place for the duration of each stage. After the stage was completed, the shield was gently removed.
Mohs Method Verbiage: An incision at a 45 degree angle following the standard Mohs approach was done and the specimen was harvested as a microscopic controlled layer.
Surgeon/Pathologist Verbiage (Will Incorporate Name Of Surgeon From Intro If Not Blank): operated in two distinct and integrated capacities as the surgeon and pathologist.
Mohs Histo Method Verbiage: Each section was then chromacoded and processed in the Mohs lab using the Mohs protocol and submitted for frozen section.
Subsequent Stages Histo Method Verbiage: Using a similar technique to that described above, a thin layer of tissue was removed from all areas where tumor was visible on the previous stage.  The tissue was again oriented, mapped, dyed, and processed as above.
Mohs Rapid Report Verbiage: The area of clinically evident tumor was marked with skin marking ink.  An incision using a #15-blade scalpel at a 45 degree angle following the standard Mohs approach was done and the specimen was harvested as a microscopic controlled layer. The specimen was taken to the lab, oriented and divided into the necessary number of pieces, chromacoded and submitted for horizontal frozen sections. The Mohs surgeon then examined the prepared microscopic sections. Any areas of residual tumor were indicated on the map. This was repeated in successive stages until a tumor free defect was achieved.
Complex Repair Preamble Text (Leave Blank If You Do Not Want): Extensive wide undermining was performed.
Intermediate Repair Preamble Text (Leave Blank If You Do Not Want): Undermining was performed with blunt dissection.
Non-Graft Cartilage Fenestration Text: The cartilage was fenestrated with a 2mm punch biopsy to help facilitate healing.
Graft Cartilage Fenestration Text: The cartilage was fenestrated with a 2mm punch biopsy to help facilitate graft survival and healing.
Secondary Intention Text (Leave Blank If You Do Not Want): The defect will heal with secondary intention.
No Repair - Repaired With Adjacent Surgical Defect Text (Leave Blank If You Do Not Want): After obtaining clear surgical margins the defect was repaired concurrently with another surgical defect which was in close approximation.
Adjacent Tissue Transfer Text: The defect edges were debeveled with a #15 scalpel blade.  Given the location of the defect and the proximity to free margins an adjacent tissue transfer was deemed most appropriate.  Using a sterile surgical marker, an appropriate flap was drawn incorporating the defect and placing the expected incisions within the relaxed skin tension lines where possible.    The area thus outlined was incised deep to adipose tissue with a #15 scalpel blade.  The skin margins were undermined to an appropriate distance in all directions utilizing iris scissors.
Advancement Flap (Single) Text: The defect edges were debeveled with a #15 scalpel blade.  Given the location of the defect and the proximity to free margins a single advancement flap was deemed most appropriate.  Using a sterile surgical marker, an appropriate advancement flap was drawn incorporating the defect and placing the expected incisions within the relaxed skin tension lines where possible.    The area thus outlined was incised deep to adipose tissue with a #15 scalpel blade.  The skin margins were undermined to an appropriate distance in all directions utilizing iris scissors.
Advancement Flap (Double) Text: The defect edges were debeveled with a #15 scalpel blade.  Given the location of the defect and the proximity to free margins a double advancement flap was deemed most appropriate.  Using a sterile surgical marker, the appropriate advancement flaps were drawn incorporating the defect and placing the expected incisions within the relaxed skin tension lines where possible.    The area thus outlined was incised deep to adipose tissue with a #15 scalpel blade.  The skin margins were undermined to an appropriate distance in all directions utilizing iris scissors.
Burow's Advancement Flap Text: The defect edges were debeveled with a #15 scalpel blade.  Given the location of the defect and the proximity to free margins a Burow's advancement flap was deemed most appropriate.  Using a sterile surgical marker, the appropriate advancement flap was drawn incorporating the defect and placing the expected incisions within the relaxed skin tension lines where possible.    The area thus outlined was incised deep to adipose tissue with a #15 scalpel blade.  The skin margins were undermined to an appropriate distance in all directions utilizing iris scissors.
Chonodrocutaneous Helical Advancement Flap Text: The defect edges were debeveled with a #15 scalpel blade.  Given the location of the defect and the proximity to free margins a chondrocutaneous helical advancement flap was deemed most appropriate.  Using a sterile surgical marker, the appropriate advancement flap was drawn incorporating the defect and placing the expected incisions within the relaxed skin tension lines where possible.    The area thus outlined was incised deep to adipose tissue with a #15 scalpel blade.  The skin margins were undermined to an appropriate distance in all directions utilizing iris scissors.
Crescentic Advancement Flap Text: The defect edges were debeveled with a #15 scalpel blade.  Given the location of the defect and the proximity to free margins a crescentic advancement flap was deemed most appropriate.  Using a sterile surgical marker, the appropriate advancement flap was drawn incorporating the defect and placing the expected incisions within the relaxed skin tension lines where possible.    The area thus outlined was incised deep to adipose tissue with a #15 scalpel blade.  The skin margins were undermined to an appropriate distance in all directions utilizing iris scissors.
A-T Advancement Flap Text: The defect edges were debeveled with a #15 scalpel blade.  Given the location of the defect, shape of the defect and the proximity to free margins an A-T advancement flap was deemed most appropriate.  Using a sterile surgical marker, an appropriate advancement flap was drawn incorporating the defect and placing the expected incisions within the relaxed skin tension lines where possible.    The area thus outlined was incised deep to adipose tissue with a #15 scalpel blade.  The skin margins were undermined to an appropriate distance in all directions utilizing iris scissors.
O-T Advancement Flap Text: The defect edges were debeveled with a #15 scalpel blade.  Given the location of the defect, shape of the defect and the proximity to free margins an O-T advancement flap was deemed most appropriate.  Using a sterile surgical marker, an appropriate advancement flap was drawn incorporating the defect and placing the expected incisions within the relaxed skin tension lines where possible.    The area thus outlined was incised deep to adipose tissue with a #15 scalpel blade.  The skin margins were undermined to an appropriate distance in all directions utilizing iris scissors.
O-L Flap Text: The defect edges were debeveled with a #15 scalpel blade.  Given the location of the defect, shape of the defect and the proximity to free margins an O-L flap was deemed most appropriate.  Using a sterile surgical marker, an appropriate advancement flap was drawn incorporating the defect and placing the expected incisions within the relaxed skin tension lines where possible.    The area thus outlined was incised deep to adipose tissue with a #15 scalpel blade.  The skin margins were undermined to an appropriate distance in all directions utilizing iris scissors.
O-Z Flap Text: The defect edges were debeveled with a #15 scalpel blade.  Given the location of the defect, shape of the defect and the proximity to free margins an O-Z flap was deemed most appropriate.  Using a sterile surgical marker, an appropriate transposition flap was drawn incorporating the defect and placing the expected incisions within the relaxed skin tension lines where possible. The area thus outlined was incised deep to adipose tissue with a #15 scalpel blade.  The skin margins were undermined to an appropriate distance in all directions utilizing iris scissors.
Double O-Z Flap Text: The defect edges were debeveled with a #15 scalpel blade.  Given the location of the defect, shape of the defect and the proximity to free margins a Double O-Z flap was deemed most appropriate.  Using a sterile surgical marker, an appropriate transposition flap was drawn incorporating the defect and placing the expected incisions within the relaxed skin tension lines where possible. The area thus outlined was incised deep to adipose tissue with a #15 scalpel blade.  The skin margins were undermined to an appropriate distance in all directions utilizing iris scissors.
V-Y Flap Text: The defect edges were debeveled with a #15 scalpel blade.  Given the location of the defect, shape of the defect and the proximity to free margins a V-Y flap was deemed most appropriate.  Using a sterile surgical marker, an appropriate advancement flap was drawn incorporating the defect and placing the expected incisions within the relaxed skin tension lines where possible.    The area thus outlined was incised deep to adipose tissue with a #15 scalpel blade.  The skin margins were undermined to an appropriate distance in all directions utilizing iris scissors.
Advancement-Rotation Flap Text: The defect edges were debeveled with a #15 scalpel blade.  Given the location of the defect, shape of the defect and the proximity to free margins an advancement-rotation flap was deemed most appropriate.  Using a sterile surgical marker, an appropriate flap was drawn incorporating the defect and placing the expected incisions within the relaxed skin tension lines where possible. The area thus outlined was incised deep to adipose tissue with a #15 scalpel blade.  The skin margins were undermined to an appropriate distance in all directions utilizing iris scissors.
Mercedes Flap Text: The defect edges were debeveled with a #15 scalpel blade.  Given the location of the defect, shape of the defect and the proximity to free margins a Mercedes flap was deemed most appropriate.  Using a sterile surgical marker, an appropriate advancement flap was drawn incorporating the defect and placing the expected incisions within the relaxed skin tension lines where possible. The area thus outlined was incised deep to adipose tissue with a #15 scalpel blade.  The skin margins were undermined to an appropriate distance in all directions utilizing iris scissors.
Modified Advancement Flap Text: The defect edges were debeveled with a #15 scalpel blade.  Given the location of the defect, shape of the defect and the proximity to free margins a modified advancement flap was deemed most appropriate.  Using a sterile surgical marker, an appropriate advancement flap was drawn incorporating the defect and placing the expected incisions within the relaxed skin tension lines where possible.    The area thus outlined was incised deep to adipose tissue with a #15 scalpel blade.  The skin margins were undermined to an appropriate distance in all directions utilizing iris scissors.
Mucosal Advancement Flap Text: Given the location of the defect, shape of the defect and the proximity to free margins a mucosal advancement flap was deemed most appropriate. Incisions were made with a 15 blade scalpel in the appropriate fashion along the cutaneous vermillion border and the mucosal lip. The remaining actinically damaged mucosal tissue was excised.  The mucosal advancement flap was then elevated to the gingival sulcus with care taken to preserve the neurovascular structures and advanced into the primary defect. Care was taken to ensure that precise realignment of the vermillion border was achieved.
Peng Advancement Flap Text: The defect edges were debeveled with a #15 scalpel blade.  Given the location of the defect, shape of the defect and the proximity to free margins a Peng advancement flap was deemed most appropriate.  Using a sterile surgical marker, an appropriate advancement flap was drawn incorporating the defect and placing the expected incisions within the relaxed skin tension lines where possible. The area thus outlined was incised deep to adipose tissue with a #15 scalpel blade.  The skin margins were undermined to an appropriate distance in all directions utilizing iris scissors.
Hatchet Flap Text: The defect edges were debeveled with a #15 scalpel blade.  Given the location of the defect, shape of the defect and the proximity to free margins a hatchet flap was deemed most appropriate.  Using a sterile surgical marker, an appropriate hatchet flap was drawn incorporating the defect and placing the expected incisions within the relaxed skin tension lines where possible.    The area thus outlined was incised deep to adipose tissue with a #15 scalpel blade.  The skin margins were undermined to an appropriate distance in all directions utilizing iris scissors.
Rotation Flap Text: The defect edges were debeveled with a #15 scalpel blade.  Given the location of the defect, shape of the defect and the proximity to free margins a rotation flap was deemed most appropriate.  Using a sterile surgical marker, an appropriate rotation flap was drawn incorporating the defect and placing the expected incisions within the relaxed skin tension lines where possible.    The area thus outlined was incised deep to adipose tissue with a #15 scalpel blade.  The skin margins were undermined to an appropriate distance in all directions utilizing iris scissors.
Spiral Flap Text: The defect edges were debeveled with a #15 scalpel blade.  Given the location of the defect, shape of the defect and the proximity to free margins a spiral flap was deemed most appropriate.  Using a sterile surgical marker, an appropriate rotation flap was drawn incorporating the defect and placing the expected incisions within the relaxed skin tension lines where possible. The area thus outlined was incised deep to adipose tissue with a #15 scalpel blade.  The skin margins were undermined to an appropriate distance in all directions utilizing iris scissors.
Staged Advancement Flap Text: The defect edges were debeveled with a #15 scalpel blade.  Given the location of the defect, shape of the defect and the proximity to free margins a staged advancement flap was deemed most appropriate.  Using a sterile surgical marker, an appropriate advancement flap was drawn incorporating the defect and placing the expected incisions within the relaxed skin tension lines where possible. The area thus outlined was incised deep to adipose tissue with a #15 scalpel blade.  The skin margins were undermined to an appropriate distance in all directions utilizing iris scissors.
Star Wedge Flap Text: The defect edges were debeveled with a #15 scalpel blade.  Given the location of the defect, shape of the defect and the proximity to free margins a star wedge flap was deemed most appropriate.  Using a sterile surgical marker, an appropriate rotation flap was drawn incorporating the defect and placing the expected incisions within the relaxed skin tension lines where possible. The area thus outlined was incised deep to adipose tissue with a #15 scalpel blade.  The skin margins were undermined to an appropriate distance in all directions utilizing iris scissors.
Transposition Flap Text: The defect edges were debeveled with a #15 scalpel blade.  Given the location of the defect and the proximity to free margins a transposition flap was deemed most appropriate.  Using a sterile surgical marker, an appropriate transposition flap was drawn incorporating the defect.    The area thus outlined was incised deep to adipose tissue with a #15 scalpel blade.  The skin margins were undermined to an appropriate distance in all directions utilizing iris scissors.
Muscle Hinge Flap Text: The defect edges were debeveled with a #15 scalpel blade.  Given the size, depth and location of the defect and the proximity to free margins a muscle hinge flap was deemed most appropriate.  Using a sterile surgical marker, an appropriate hinge flap was drawn incorporating the defect. The area thus outlined was incised with a #15 scalpel blade.  The skin margins were undermined to an appropriate distance in all directions utilizing iris scissors.
Mustarde Flap Text: The defect edges were debeveled with a #15 scalpel blade.  Given the size, depth and location of the defect and the proximity to free margins a Mustarde flap was deemed most appropriate.  Using a sterile surgical marker, an appropriate flap was drawn incorporating the defect. The area thus outlined was incised with a #15 scalpel blade.  The skin margins were undermined to an appropriate distance in all directions utilizing iris scissors.
Nasal Turnover Hinge Flap Text: The defect edges were debeveled with a #15 scalpel blade.  Given the size, depth, location of the defect and the defect being full thickness a nasal turnover hinge flap was deemed most appropriate.  Using a sterile surgical marker, an appropriate hinge flap was drawn incorporating the defect. The area thus outlined was incised with a #15 scalpel blade. The flap was designed to recreate the nasal mucosal lining and the alar rim. The skin margins were undermined to an appropriate distance in all directions utilizing iris scissors.
Nasalis-Muscle-Based Myocutaneous Island Pedicle Flap Text: Using a #15 blade, an incision was made around the donor flap to the level of the nasalis muscle. Wide lateral undermining was then performed in both the subcutaneous plane above the nasalis muscle, and in a submuscular plane just above periosteum. This allowed the formation of a free nasalis muscle axial pedicle (based on the angular artery) which was still attached to the actual cutaneous flap, increasing its mobility and vascular viability. Hemostasis was obtained with pinpoint electrocoagulation. The flap was mobilized into position and the pivotal anchor points positioned and stabilized with buried interrupted sutures. Subcutaneous and dermal tissues were closed in a multilayered fashion with sutures. Tissue redundancies were excised, and the epidermal edges were apposed without significant tension and sutured with sutures.
Orbicularis Oris Muscle Flap Text: The defect edges were debeveled with a #15 scalpel blade.  Given that the defect affected the competency of the oral sphincter an obicularis oris muscle flap was deemed most appropriate to restore this competency and normal muscle function.  Using a sterile surgical marker, an appropriate flap was drawn incorporating the defect. The area thus outlined was incised with a #15 scalpel blade.
Melolabial Transposition Flap Text: The defect edges were debeveled with a #15 scalpel blade.  Given the location of the defect and the proximity to free margins a melolabial flap was deemed most appropriate.  Using a sterile surgical marker, an appropriate melolabial transposition flap was drawn incorporating the defect.    The area thus outlined was incised deep to adipose tissue with a #15 scalpel blade.  The skin margins were undermined to an appropriate distance in all directions utilizing iris scissors.
Rhombic Flap Text: The defect edges were debeveled with a #15 scalpel blade.  Given the location of the defect and the proximity to free margins a rhombic flap was deemed most appropriate.  Using a sterile surgical marker, an appropriate rhombic flap was drawn incorporating the defect.    The area thus outlined was incised deep to adipose tissue with a #15 scalpel blade.  The skin margins were undermined to an appropriate distance in all directions utilizing iris scissors.
Rhomboid Transposition Flap Text: The defect edges were debeveled with a #15 scalpel blade.  Given the location of the defect and the proximity to free margins a rhomboid transposition flap was deemed most appropriate.  Using a sterile surgical marker, an appropriate rhomboid flap was drawn incorporating the defect.    The area thus outlined was incised deep to adipose tissue with a #15 scalpel blade.  The skin margins were undermined to an appropriate distance in all directions utilizing iris scissors.
Bi-Rhombic Flap Text: The defect edges were debeveled with a #15 scalpel blade.  Given the location of the defect and the proximity to free margins a bi-rhombic flap was deemed most appropriate.  Using a sterile surgical marker, an appropriate rhombic flap was drawn incorporating the defect. The area thus outlined was incised deep to adipose tissue with a #15 scalpel blade.  The skin margins were undermined to an appropriate distance in all directions utilizing iris scissors.
Helical Rim Advancement Flap Text: The defect edges were debeveled with a #15 blade scalpel.  Given the location of the defect and the proximity to free margins (helical rim) a double helical rim advancement flap was deemed most appropriate.  Using a sterile surgical marker, the appropriate advancement flaps were drawn incorporating the defect and placing the expected incisions between the helical rim and antihelix where possible.  The area thus outlined was incised through and through with a #15 scalpel blade.  With a skin hook and iris scissors, the flaps were gently and sharply undermined and freed up.
Bilateral Helical Rim Advancement Flap Text: The defect edges were debeveled with a #15 blade scalpel.  Given the location of the defect and the proximity to free margins (helical rim) a bilateral helical rim advancement flap was deemed most appropriate.  Using a sterile surgical marker, the appropriate advancement flaps were drawn incorporating the defect and placing the expected incisions between the helical rim and antihelix where possible.  The area thus outlined was incised through and through with a #15 scalpel blade.  With a skin hook and iris scissors, the flaps were gently and sharply undermined and freed up.
Ear Star Wedge Flap Text: The defect edges were debeveled with a #15 blade scalpel.  Given the location of the defect and the proximity to free margins (helical rim) an ear star wedge flap was deemed most appropriate.  Using a sterile surgical marker, the appropriate flap was drawn incorporating the defect and placing the expected incisions between the helical rim and antihelix where possible.  The area thus outlined was incised through and through with a #15 scalpel blade.
Banner Transposition Flap Text: The defect edges were debeveled with a #15 scalpel blade.  Given the location of the defect and the proximity to free margins a Banner transposition flap was deemed most appropriate.  Using a sterile surgical marker, an appropriate flap drawn around the defect. The area thus outlined was incised deep to adipose tissue with a #15 scalpel blade.  The skin margins were undermined to an appropriate distance in all directions utilizing iris scissors.
Bilobed Flap Text: The defect edges were debeveled with a #15 scalpel blade.  Given the location of the defect and the proximity to free margins a bilobe flap was deemed most appropriate.  Using a sterile surgical marker, an appropriate bilobe flap drawn around the defect.    The area thus outlined was incised deep to adipose tissue with a #15 scalpel blade.  The skin margins were undermined to an appropriate distance in all directions utilizing iris scissors.
Bilobed Transposition Flap Text: The defect edges were debeveled with a #15 scalpel blade.  Given the location of the defect and the proximity to free margins a bilobed transposition flap was deemed most appropriate.  Using a sterile surgical marker, an appropriate bilobe flap drawn around the defect.    The area thus outlined was incised deep to adipose tissue with a #15 scalpel blade.  The skin margins were undermined to an appropriate distance in all directions utilizing iris scissors.
Trilobed Flap Text: The defect edges were debeveled with a #15 scalpel blade.  Given the location of the defect and the proximity to free margins a trilobed flap was deemed most appropriate.  Using a sterile surgical marker, an appropriate trilobed flap drawn around the defect.    The area thus outlined was incised deep to adipose tissue with a #15 scalpel blade.  The skin margins were undermined to an appropriate distance in all directions utilizing iris scissors.
Dorsal Nasal Flap Text: The defect edges were debeveled with a #15 scalpel blade.  Given the location of the defect and the proximity to free margins a dorsal nasal flap was deemed most appropriate.  Using a sterile surgical marker, an appropriate dorsal nasal flap was drawn around the defect.    The area thus outlined was incised deep to adipose tissue with a #15 scalpel blade.  The skin margins were undermined to an appropriate distance in all directions utilizing iris scissors.
Island Pedicle Flap Text: The defect edges were debeveled with a #15 scalpel blade.  Given the location of the defect, shape of the defect and the proximity to free margins an island pedicle advancement flap was deemed most appropriate.  Using a sterile surgical marker, an appropriate advancement flap was drawn incorporating the defect, outlining the appropriate donor tissue and placing the expected incisions within the relaxed skin tension lines where possible.    The area thus outlined was incised deep to adipose tissue with a #15 scalpel blade.  The skin margins were undermined to an appropriate distance in all directions around the primary defect and laterally outward around the island pedicle utilizing iris scissors.  There was minimal undermining beneath the pedicle flap.
Island Pedicle Flap With Canthal Suspension Text: The defect edges were debeveled with a #15 scalpel blade.  Given the location of the defect, shape of the defect and the proximity to free margins an island pedicle advancement flap was deemed most appropriate.  Using a sterile surgical marker, an appropriate advancement flap was drawn incorporating the defect, outlining the appropriate donor tissue and placing the expected incisions within the relaxed skin tension lines where possible. The area thus outlined was incised deep to adipose tissue with a #15 scalpel blade.  The skin margins were undermined to an appropriate distance in all directions around the primary defect and laterally outward around the island pedicle utilizing iris scissors.  There was minimal undermining beneath the pedicle flap. A suspension suture was placed in the canthal tendon to prevent tension and prevent ectropion.
Alar Island Pedicle Flap Text: The defect edges were debeveled with a #15 scalpel blade.  Given the location of the defect, shape of the defect and the proximity to the alar rim an island pedicle advancement flap was deemed most appropriate.  Using a sterile surgical marker, an appropriate advancement flap was drawn incorporating the defect, outlining the appropriate donor tissue and placing the expected incisions within the nasal ala running parallel to the alar rim. The area thus outlined was incised with a #15 scalpel blade.  The skin margins were undermined minimally to an appropriate distance in all directions around the primary defect and laterally outward around the island pedicle utilizing iris scissors.  There was minimal undermining beneath the pedicle flap.
Double Island Pedicle Flap Text: The defect edges were debeveled with a #15 scalpel blade.  Given the location of the defect, shape of the defect and the proximity to free margins a double island pedicle advancement flap was deemed most appropriate.  Using a sterile surgical marker, an appropriate advancement flap was drawn incorporating the defect, outlining the appropriate donor tissue and placing the expected incisions within the relaxed skin tension lines where possible.    The area thus outlined was incised deep to adipose tissue with a #15 scalpel blade.  The skin margins were undermined to an appropriate distance in all directions around the primary defect and laterally outward around the island pedicle utilizing iris scissors.  There was minimal undermining beneath the pedicle flap.
Island Pedicle Flap-Requiring Vessel Identification Text: The defect edges were debeveled with a #15 scalpel blade.  Given the location of the defect, shape of the defect and the proximity to free margins an island pedicle advancement flap was deemed most appropriate.  Using a sterile surgical marker, an appropriate advancement flap was drawn, based on the axial vessel mentioned above, incorporating the defect, outlining the appropriate donor tissue and placing the expected incisions within the relaxed skin tension lines where possible.    The area thus outlined was incised deep to adipose tissue with a #15 scalpel blade.  The skin margins were undermined to an appropriate distance in all directions around the primary defect and laterally outward around the island pedicle utilizing iris scissors.  There was minimal undermining beneath the pedicle flap.
Keystone Flap Text: The defect edges were debeveled with a #15 scalpel blade.  Given the location of the defect, shape of the defect a keystone flap was deemed most appropriate.  Using a sterile surgical marker, an appropriate keystone flap was drawn incorporating the defect, outlining the appropriate donor tissue and placing the expected incisions within the relaxed skin tension lines where possible. The area thus outlined was incised deep to adipose tissue with a #15 scalpel blade.  The skin margins were undermined to an appropriate distance in all directions around the primary defect and laterally outward around the flap utilizing iris scissors.
O-T Plasty Text: The defect edges were debeveled with a #15 scalpel blade.  Given the location of the defect, shape of the defect and the proximity to free margins an O-T plasty was deemed most appropriate.  Using a sterile surgical marker, an appropriate O-T plasty was drawn incorporating the defect and placing the expected incisions within the relaxed skin tension lines where possible.    The area thus outlined was incised deep to adipose tissue with a #15 scalpel blade.  The skin margins were undermined to an appropriate distance in all directions utilizing iris scissors.
O-Z Plasty Text: The defect edges were debeveled with a #15 scalpel blade.  Given the location of the defect, shape of the defect and the proximity to free margins an O-Z plasty (double transposition flap) was deemed most appropriate.  Using a sterile surgical marker, the appropriate transposition flaps were drawn incorporating the defect and placing the expected incisions within the relaxed skin tension lines where possible.    The area thus outlined was incised deep to adipose tissue with a #15 scalpel blade.  The skin margins were undermined to an appropriate distance in all directions utilizing iris scissors.  Hemostasis was achieved with electrocautery.  The flaps were then transposed into place, one clockwise and the other counterclockwise, and anchored with interrupted buried subcutaneous sutures.
Double O-Z Plasty Text: The defect edges were debeveled with a #15 scalpel blade.  Given the location of the defect, shape of the defect and the proximity to free margins a Double O-Z plasty (double transposition flap) was deemed most appropriate.  Using a sterile surgical marker, the appropriate transposition flaps were drawn incorporating the defect and placing the expected incisions within the relaxed skin tension lines where possible. The area thus outlined was incised deep to adipose tissue with a #15 scalpel blade.  The skin margins were undermined to an appropriate distance in all directions utilizing iris scissors.  Hemostasis was achieved with electrocautery.  The flaps were then transposed into place, one clockwise and the other counterclockwise, and anchored with interrupted buried subcutaneous sutures.
V-Y Plasty Text: The defect edges were debeveled with a #15 scalpel blade.  Given the location of the defect, shape of the defect and the proximity to free margins an V-Y advancement flap was deemed most appropriate.  Using a sterile surgical marker, an appropriate advancement flap was drawn incorporating the defect and placing the expected incisions within the relaxed skin tension lines where possible.    The area thus outlined was incised deep to adipose tissue with a #15 scalpel blade.  The skin margins were undermined to an appropriate distance in all directions utilizing iris scissors.
H Plasty Text: Given the location of the defect, shape of the defect and the proximity to free margins a H-plasty was deemed most appropriate for repair.  Using a sterile surgical marker, the appropriate advancement arms of the H-plasty were drawn incorporating the defect and placing the expected incisions within the relaxed skin tension lines where possible. The area thus outlined was incised deep to adipose tissue with a #15 scalpel blade. The skin margins were undermined to an appropriate distance in all directions utilizing iris scissors.  The opposing advancement arms were then advanced into place in opposite direction and anchored with interrupted buried subcutaneous sutures.
W Plasty Text: The lesion was extirpated to the level of the fat with a #15 scalpel blade.  Given the location of the defect, shape of the defect and the proximity to free margins a W-plasty was deemed most appropriate for repair.  Using a sterile surgical marker, the appropriate transposition arms of the W-plasty were drawn incorporating the defect and placing the expected incisions within the relaxed skin tension lines where possible.    The area thus outlined was incised deep to adipose tissue with a #15 scalpel blade.  The skin margins were undermined to an appropriate distance in all directions utilizing iris scissors.  The opposing transposition arms were then transposed into place in opposite direction and anchored with interrupted buried subcutaneous sutures.
Z Plasty Text: The lesion was extirpated to the level of the fat with a #15 scalpel blade.  Given the location of the defect, shape of the defect and the proximity to free margins a Z-plasty was deemed most appropriate for repair.  Using a sterile surgical marker, the appropriate transposition arms of the Z-plasty were drawn incorporating the defect and placing the expected incisions within the relaxed skin tension lines where possible.    The area thus outlined was incised deep to adipose tissue with a #15 scalpel blade.  The skin margins were undermined to an appropriate distance in all directions utilizing iris scissors.  The opposing transposition arms were then transposed into place in opposite direction and anchored with interrupted buried subcutaneous sutures.
Zygomaticofacial Flap Text: Given the location of the defect, shape of the defect and the proximity to free margins a zygomaticofacial flap was deemed most appropriate for repair.  Using a sterile surgical marker, the appropriate flap was drawn incorporating the defect and placing the expected incisions within the relaxed skin tension lines where possible. The area thus outlined was incised deep to adipose tissue with a #15 scalpel blade with preservation of a vascular pedicle.  The skin margins were undermined to an appropriate distance in all directions utilizing iris scissors.  The flap was then placed into the defect and anchored with interrupted buried subcutaneous sutures.
Cheek Interpolation Flap Text: A decision was made to reconstruct the defect utilizing an interpolation axial flap and a staged reconstruction.  A telfa template was made of the defect.  This telfa template was then used to outline the Cheek Interpolation flap.  The donor area for the pedicle flap was then injected with anesthesia.  The flap was excised through the skin and subcutaneous tissue down to the layer of the underlying musculature.  The interpolation flap was carefully excised within this deep plane to maintain its blood supply.  The edges of the donor site were undermined.   The donor site was closed in a primary fashion.  The pedicle was then rotated into position and sutured.  Once the tube was sutured into place, adequate blood supply was confirmed with blanching and refill.  The pedicle was then wrapped with xeroform gauze and dressed appropriately with a telfa and gauze bandage to ensure continued blood supply and protect the attached pedicle.
Cheek-To-Nose Interpolation Flap Text: A decision was made to reconstruct the defect utilizing an interpolation axial flap and a staged reconstruction.  A telfa template was made of the defect.  This telfa template was then used to outline the Cheek-To-Nose Interpolation flap.  The donor area for the pedicle flap was then injected with anesthesia.  The flap was excised through the skin and subcutaneous tissue down to the layer of the underlying musculature.  The interpolation flap was carefully excised within this deep plane to maintain its blood supply.  The edges of the donor site were undermined.   The donor site was closed in a primary fashion.  The pedicle was then rotated into position and sutured.  Once the tube was sutured into place, adequate blood supply was confirmed with blanching and refill.  The pedicle was then wrapped with xeroform gauze and dressed appropriately with a telfa and gauze bandage to ensure continued blood supply and protect the attached pedicle.
Interpolation Flap Text: A decision was made to reconstruct the defect utilizing an interpolation axial flap and a staged reconstruction.  A telfa template was made of the defect.  This telfa template was then used to outline the interpolation flap.  The donor area for the pedicle flap was then injected with anesthesia.  The flap was excised through the skin and subcutaneous tissue down to the layer of the underlying musculature.  The interpolation flap was carefully excised within this deep plane to maintain its blood supply.  The edges of the donor site were undermined.   The donor site was closed in a primary fashion.  The pedicle was then rotated into position and sutured.  Once the tube was sutured into place, adequate blood supply was confirmed with blanching and refill.  The pedicle was then wrapped with xeroform gauze and dressed appropriately with a telfa and gauze bandage to ensure continued blood supply and protect the attached pedicle.
Melolabial Interpolation Flap Text: A decision was made to reconstruct the defect utilizing an interpolation axial flap and a staged reconstruction.  A telfa template was made of the defect.  This telfa template was then used to outline the melolabial interpolation flap.  The donor area for the pedicle flap was then injected with anesthesia.  The flap was excised through the skin and subcutaneous tissue down to the layer of the underlying musculature.  The pedicle flap was carefully excised within this deep plane to maintain its blood supply.  The edges of the donor site were undermined.   The donor site was closed in a primary fashion.  The pedicle was then rotated into position and sutured.  Once the tube was sutured into place, adequate blood supply was confirmed with blanching and refill.  The pedicle was then wrapped with xeroform gauze and dressed appropriately with a telfa and gauze bandage to ensure continued blood supply and protect the attached pedicle.
Mastoid Interpolation Flap Text: A decision was made to reconstruct the defect utilizing an interpolation axial flap and a staged reconstruction.  A telfa template was made of the defect.  This telfa template was then used to outline the mastoid interpolation flap.  The donor area for the pedicle flap was then injected with anesthesia.  The flap was excised through the skin and subcutaneous tissue down to the layer of the underlying musculature.  The pedicle flap was carefully excised within this deep plane to maintain its blood supply.  The edges of the donor site were undermined.   The donor site was closed in a primary fashion.  The pedicle was then rotated into position and sutured.  Once the tube was sutured into place, adequate blood supply was confirmed with blanching and refill.  The pedicle was then wrapped with xeroform gauze and dressed appropriately with a telfa and gauze bandage to ensure continued blood supply and protect the attached pedicle.
Posterior Auricular Interpolation Flap Text: A decision was made to reconstruct the defect utilizing an interpolation axial flap and a staged reconstruction.  A telfa template was made of the defect.  This telfa template was then used to outline the posterior auricular interpolation flap.  The donor area for the pedicle flap was then injected with anesthesia.  The flap was excised through the skin and subcutaneous tissue down to the layer of the underlying musculature.  The pedicle flap was carefully excised within this deep plane to maintain its blood supply.  The edges of the donor site were undermined.   The donor site was closed in a primary fashion.  The pedicle was then rotated into position and sutured.  Once the tube was sutured into place, adequate blood supply was confirmed with blanching and refill.  The pedicle was then wrapped with xeroform gauze and dressed appropriately with a telfa and gauze bandage to ensure continued blood supply and protect the attached pedicle.
Paramedian Forehead Flap Text: A decision was made to reconstruct the defect utilizing an interpolation axial flap and a staged reconstruction.  A telfa template was made of the defect.  This telfa template was then used to outline the paramedian forehead pedicle flap.  The donor area for the pedicle flap was then injected with anesthesia.  The flap was excised through the skin and subcutaneous tissue down to the layer of the underlying musculature.  The pedicle flap was carefully excised within this deep plane to maintain its blood supply.  The edges of the donor site were undermined.   The donor site was closed in a primary fashion.  The pedicle was then rotated into position and sutured.  Once the tube was sutured into place, adequate blood supply was confirmed with blanching and refill.  The pedicle was then wrapped with xeroform gauze and dressed appropriately with a telfa and gauze bandage to ensure continued blood supply and protect the attached pedicle.
Abbe Flap (Upper To Lower Lip) Text: The defect of the lower lip was assessed and measured.  Given the location and size of the defect, an Abbe flap was deemed most appropriate.  Using a sterile surgical marker, an appropriate Abbe flap was measured and drawn on the upper lip. Local anesthesia was then infiltrated.  A scalpel was then used to incise the upper lip through and through the skin, vermilion, muscle and mucosa, leaving the flap pedicled on the opposite side.  The flap was then rotated and transferred to the lower lip defect.  The flap was then sutured into place with a three layer technique, closing the orbicularis oris muscle layer with subcutaneous buried sutures, followed by a mucosal layer and an epidermal layer.
Abbe Flap (Lower To Upper Lip) Text: The defect of the upper lip was assessed and measured.  Given the location and size of the defect, an Abbe flap was deemed most appropriate.  Using a sterile surgical marker, an appropriate Abbe flap was measured and drawn on the lower lip. Local anesthesia was then infiltrated. A scalpel was then used to incise the upper lip through and through the skin, vermilion, muscle and mucosa, leaving the flap pedicled on the opposite side.  The flap was then rotated and transferred to the lower lip defect.  The flap was then sutured into place with a three layer technique, closing the orbicularis oris muscle layer with subcutaneous buried sutures, followed by a mucosal layer and an epidermal layer.
Estlander Flap (Upper To Lower Lip) Text: The defect of the lower lip was assessed and measured.  Given the location and size of the defect, an Estlander flap was deemed most appropriate.  Using a sterile surgical marker, an appropriate Estlander flap was measured and drawn on the upper lip. Local anesthesia was then infiltrated. A scalpel was then used to incise the lateral aspect of the flap, through skin, muscle and mucosa, leaving the flap pedicled medially.  The flap was then rotated and positioned to fill the lower lip defect.  The flap was then sutured into place with a three layer technique, closing the orbicularis oris muscle layer with subcutaneous buried sutures, followed by a mucosal layer and an epidermal layer.
Cheiloplasty (Less Than 50%) Text: A decision was made to reconstruct the defect with a  cheiloplasty.  The defect was undermined extensively.  Additional obicularis oris muscle was excised with a 15 blade scalpel.  The defect was converted into a full thickness wedge, of less than 50% of the vertical height of the lip, to facilite a better cosmetic result.  Small vessels were then tied off with 5-0 monocyrl. The obicularis oris, superficial fascia, adipose and dermis were then reapproximated.  After the deeper layers were approximated the epidermis was reapproximated with particular care given to realign the vermillion border.
Cheiloplasty (Complex) Text: A decision was made to reconstruct the defect with a  cheiloplasty.  The defect was undermined extensively.  Additional obicularis oris muscle was excised with a 15 blade scalpel.  The defect was converted into a full thickness wedge to facilite a better cosmetic result.  Small vessels were then tied off with 5-0 monocyrl. The obicularis oris, superficial fascia, adipose and dermis were then reapproximated.  After the deeper layers were approximated the epidermis was reapproximated with particular care given to realign the vermillion border.
Ear Wedge Repair Text: A wedge excision was completed by carrying down an excision through the full thickness of the ear and cartilage with an inward facing Burow's triangle. The wound was then closed in a layered fashion.
Full Thickness Lip Wedge Repair (Flap) Text: Given the location of the defect and the proximity to free margins a full thickness wedge repair was deemed most appropriate.  Using a sterile surgical marker, the appropriate repair was drawn incorporating the defect and placing the expected incisions perpendicular to the vermillion border.  The vermillion border was also meticulously outlined to ensure appropriate reapproximation during the repair.  The area thus outlined was incised through and through with a #15 scalpel blade.  The muscularis and dermis were reaproximated with deep sutures following hemostasis. Care was taken to realign the vermillion border before proceeding with the superficial closure.  Once the vermillion was realigned the superfical and mucosal closure was finished.
Ftsg Text: The defect edges were debeveled with a #15 scalpel blade.  Given the location of the defect, shape of the defect and the proximity to free margins a full thickness skin graft was deemed most appropriate.  Using a sterile surgical marker, the primary defect shape was transferred to the donor site. The area thus outlined was incised deep to adipose tissue with a #15 scalpel blade.  The harvested graft was then trimmed of adipose tissue until only dermis and epidermis was left.  The skin margins of the secondary defect were undermined to an appropriate distance in all directions utilizing iris scissors.  The secondary defect was closed with interrupted buried subcutaneous sutures.  The skin edges were then re-apposed with running  sutures.  The skin graft was then placed in the primary defect and oriented appropriately.
Split-Thickness Skin Graft Text: The defect edges were debeveled with a #15 scalpel blade.  Given the location of the defect, shape of the defect and the proximity to free margins a split thickness skin graft was deemed most appropriate.  Using a sterile surgical marker, the primary defect shape was transferred to the donor site. The split thickness graft was then harvested.  The skin graft was then placed in the primary defect and oriented appropriately.
Burow's Graft Text: The defect edges were debeveled with a #15 scalpel blade.  Given the location of the defect, shape of the defect, the proximity to free margins and the presence of a standing cone deformity a Burow's skin graft was deemed most appropriate. The standing cone was removed and this tissue was then trimmed to the shape of the primary defect. The adipose tissue was also removed until only dermis and epidermis were left.  The skin margins of the secondary defect were undermined to an appropriate distance in all directions utilizing iris scissors.  The secondary defect was closed with interrupted buried subcutaneous sutures.  The skin edges were then re-apposed with running  sutures.  The skin graft was then placed in the primary defect and oriented appropriately.
Cartilage Graft Text: The defect edges were debeveled with a #15 scalpel blade.  Given the location of the defect, shape of the defect, the fact the defect involved a full thickness cartilage defect a cartilage graft was deemed most appropriate.  An appropriate donor site was identified, cleansed, and anesthetized. The cartilage graft was then harvested and transferred to the recipient site, oriented appropriately and then sutured into place.  The secondary defect was then repaired using a primary closure.
Composite Graft Text: The defect edges were debeveled with a #15 scalpel blade.  Given the location of the defect, shape of the defect, the proximity to free margins and the fact the defect was full thickness a composite graft was deemed most appropriate.  The defect was outline and then transferred to the donor site.  A full thickness graft was then excised from the donor site. The graft was then placed in the primary defect, oriented appropriately and then sutured into place.  The secondary defect was then repaired using a primary closure.
Epidermal Autograft Text: The defect edges were debeveled with a #15 scalpel blade.  Given the location of the defect, shape of the defect and the proximity to free margins an epidermal autograft was deemed most appropriate.  Using a sterile surgical marker, the primary defect shape was transferred to the donor site. The epidermal graft was then harvested.  The skin graft was then placed in the primary defect and oriented appropriately.
Dermal Autograft Text: The defect edges were debeveled with a #15 scalpel blade.  Given the location of the defect, shape of the defect and the proximity to free margins a dermal autograft was deemed most appropriate.  Using a sterile surgical marker, the primary defect shape was transferred to the donor site. The area thus outlined was incised deep to adipose tissue with a #15 scalpel blade.  The harvested graft was then trimmed of adipose and epidermal tissue until only dermis was left.  The skin graft was then placed in the primary defect and oriented appropriately.
Skin Substitute Text: The defect edges were debeveled with a #15 scalpel blade.  Given the location of the defect, shape of the defect and the proximity to free margins a skin substitute graft was deemed most appropriate.  The graft material was trimmed to fit the size of the defect. The graft was then placed in the primary defect and oriented appropriately.
Tissue Cultured Epidermal Autograft Text: The defect edges were debeveled with a #15 scalpel blade.  Given the location of the defect, shape of the defect and the proximity to free margins a tissue cultured epidermal autograft was deemed most appropriate.  The graft was then trimmed to fit the size of the defect.  The graft was then placed in the primary defect and oriented appropriately.
Xenograft Text: The defect edges were debeveled with a #15 scalpel blade.  Given the location of the defect, shape of the defect and the proximity to free margins a xenograft was deemed most appropriate.  The graft was then trimmed to fit the size of the defect.  The graft was then placed in the primary defect and oriented appropriately.
Purse String (Simple) Text: Given the location of the defect and the characteristics of the surrounding skin a pursestring closure was deemed most appropriate.  Undermining was performed circumfirentially around the surgical defect.  A purstring suture was then placed and tightened.
Purse String (Intermediate) Text: Given the location of the defect and the characteristics of the surrounding skin a pursestring intermediate closure was deemed most appropriate.  Undermining was performed circumfirentially around the surgical defect.  A purstring suture was then placed and tightened.
Partial Purse String (Simple) Text: Given the location of the defect and the characteristics of the surrounding skin a simple purse string closure was deemed most appropriate.  Undermining was performed circumfirentially around the surgical defect.  A purse string suture was then placed and tightened. Wound tension only allowed a partial closure of the circular defect.
Partial Purse String (Intermediate) Text: Given the location of the defect and the characteristics of the surrounding skin an intermediate purse string closure was deemed most appropriate.  Undermining was performed circumfirentially around the surgical defect.  A purse string suture was then placed and tightened. Wound tension only allowed a partial closure of the circular defect.
Localized Dermabrasion With Wire Brush Text: The patient was draped in routine manner.  Localized dermabrasion using 3 x 17 mm wire brush was performed in routine manner to papillary dermis. This spot dermabrasion is being performed to complete skin cancer reconstruction. It also will eliminate the other sun damaged precancerous cells that are known to be part of the regional effect of a lifetime's worth of sun exposure. This localized dermabrasion is therapeutic and should not be considered cosmetic in any regard.
Tarsorrhaphy Text: A tarsorrhaphy was performed using Frost sutures.
Complex Repair And Flap Additional Text (Will Appearing After The Standard Complex Repair Text): The complex repair was not sufficient to completely close the primary defect. The remaining additional defect was repaired with the flap mentioned below.
Complex Repair And Graft Additional Text (Will Appearing After The Standard Complex Repair Text): The complex repair was not sufficient to completely close the primary defect. The remaining additional defect was repaired with the graft mentioned below.
Manual Repair Warning Statement: We plan on removing the manually selected variable below in favor of our much easier automatic structured text blocks found in the previous tab. We decided to do this to help make the flow better and give you the full power of structured data. Manual selection is never going to be ideal in our platform and I would encourage you to avoid using manual selection from this point on, especially since I will be sunsetting this feature. It is important that you do one of two things with the customized text below. First, you can save all of the text in a word file so you can have it for future reference. Second, transfer the text to the appropriate area in the Library tab. Lastly, if there is a flap or graft type which we do not have you need to let us know right away so I can add it in before the variable is hidden. No need to panic, we plan to give you roughly 6 months to make the change.
Same Histology In Subsequent Stages Text: The pattern and morphology of the tumor is as described in the first stage.
No Residual Tumor Seen Histology Text: There were no malignant cells seen in the sections examined.
Inflammation Suggestive Of Cancer Camouflage Histology Text: There was a dense lymphocytic infiltrate which prevented adequate histologic evaluation of adjacent structures.
Bcc Histology Text: There were numerous aggregates of basaloid cells.
Bcc Infiltrative Histology Text: There were numerous aggregates of basaloid cells demonstrating an infiltrative pattern.
Mart-1 - Positive Histology Text: MART-1 staining demonstrates areas of higher density and clustering of melanocytes with Pagetoid spread upwards within the epidermis. The surgical margins are positive for tumor cells.
Mart-1 - Negative Histology Text: MART-1 staining demonstrates a normal density and pattern of melanocytes along the dermal-epidermal junction. The surgical margins are negative for tumor cells.
Information: Selecting Yes will display possible errors in your note based on the variables you have selected. This validation is only offered as a suggestion for you. PLEASE NOTE THAT THE VALIDATION TEXT WILL BE REMOVED WHEN YOU FINALIZE YOUR NOTE. IF YOU WANT TO FAX A PRELIMINARY NOTE YOU WILL NEED TO TOGGLE THIS TO 'NO' IF YOU DO NOT WANT IT IN YOUR FAXED NOTE.

## 2022-08-17 NOTE — HPI: MOHS SURGERY CONSULTATION
Has The Cancer Been Biopsied Before?: has been previously biopsied
Who Is Your Referring Provider?: ANDREEA Salas
When Was Your Biopsy?: 6/8/22
Accession (Optional): [id number]

## 2022-08-17 NOTE — PROCEDURE: REPAIR NOTE
Body Location Override (Optional - Billing Will Still Be Based On Selected Body Map Location If Applicable): right triangular fossa
Referring Provider (Optional): NURA Jackson PA-C
Anesthesia Volume In Cc: 2
Did You Provide Opioid Counseling: No
Repair Type: Graft
Suturegard Retention Suture: 2-0 Nylon
Retention Suture Bite Size: 3 mm
Length To Time In Minutes Device Was In Place: 10
Number Of Hemigard Strips Per Side: 1
Simple / Intermediate / Complex Repair - Final Wound Length In Cm: 0
Undermining Type: Entire Wound
Debridement Text: The wound edges were debrided prior to proceeding with the closure to facilitate wound healing.
Helical Rim Text: The closure involved the helical rim.
Vermilion Border Text: The closure involved the vermilion border.
Nostril Rim Text: The closure involved the nostril rim.
Retention Suture Text: Retention sutures were placed to support the closure and prevent dehiscence.
Graft Type: Full Thickness Skin Graft
Graft Donor Site: right post auricular
Primary Defect Width (In Cm): 1.3
Skin Substitute: EpiFix Micronized
Include The Following Details In The Note (If No Details Will Only Be Reflected In The Surgical Fax): Yes
Detail Level: Detailed
Fellow/Resident (Optional): Closure Pack Number: 
Anesthesia Type: 1% lidocaine with 1:100,000 epinephrine
Hemostasis: Electrocautery
Estimated Blood Loss (Cc): minimal
Brow Lift Text: A midfrontal incision was made medially to the defect to allow access to the tissues just superior to the left eyebrow. Following careful dissection inferiorly in a supraperiosteal plane to the level of the left eyebrow, several 3-0 monocryl sutures were used to resuspend the eyebrow orbicularis oculi muscular unit to the superior frontal bone periosteum. This resulted in an appropriate reapproximation of static eyebrow symmetry and correction of the left brow ptosis.
Epidermal Closure: running
Wound Care: Mupirocin
Dressing: pressure dressing with telfa
Unna Boot Text: An Unna boot was placed to help immobilize the limb and facilitate more rapid healing.
Suturegard Intro: Intraoperative tissue expansion was performed, utilizing the SUTUREGARD device, in order to reduce wound tension.
Suturegard Body: The suture ends were repeatedly re-tightened and re-clamped to achieve the desired tissue expansion.
Hemigard Intro: Due to skin fragility and wound tension, it was decided to use HEMIGARD adhesive retention suture devices to permit a linear closure. The skin was cleaned and dried for a 6cm distance away from the wound. Excessive hair, if present, was removed to allow for adhesion.
Hemigard Postcare Instructions: The HEMIGARD strips are to remain completely dry for at least 5-7 days.
Graft Basting Suture (Optional): 6-0 Fast Absorbing Gut
Graft Donor Site Dermal Sutures (Optional): 5-0 Monocryl
Graft Donor Site Bandage (Optional-Leave Blank If You Don't Want In Note): Graft site and donor site bandaged for one week pressure bandage with xeroform, Mupirocin, and pressure dressing with telfa.
Closure 2 Information: This tab is for additional flaps and grafts, including complex repair and grafts and complex repair and flaps. You can also specify a different location for the additional defect, if the location is the same you do not need to select a new one. We will insert the automated text for the repair you select below just as we do for solitary flaps and grafts. Please note that at this time if you select a location with a different insurance zone you will need to override the ICD10 and CPT if appropriate.
Closure 3 Information: This tab is for additional flaps and grafts above and beyond our usual structured repairs.  Please note if you enter information here it will not currently bill and you will need to add the billing information manually.
Complex Repair Preamble Text (Leave Blank If You Do Not Want): Extensive wide undermining was performed.
Intermediate Repair Preamble Text (Leave Blank If You Do Not Want): Undermining was performed with blunt dissection.
Flap Thinning Complex Repair Preamble Text (Leave Blank If You Do Not Want): An incision was made along the previous flap suture line. Undermining was performed beneath the flap and redundant tissue was removed to restore the normal contour of the skin.
Non-Graft Cartilage Fenestration Text: The cartilage was fenestrated with a 2mm punch biopsy to help facilitate healing.
Graft Cartilage Fenestration Text: The cartilage was fenestrated with a 2mm punch biopsy to help facilitate graft survival and healing.
Preparation Of Recipient Site - Flap: The eschar was removed surgically with sharp dissection to facilitate appropriate wound healing of the following adjacent tissue rearrangement.
Preparation Of Recipient Site - Graft: The eschar was removed surgically with sharp dissection to facilitate appropriate survival of the following graft.
Preparation Of Recipient Site - Flap Takedown: The eschar and granulation tissue was removed surgically with sharp dissection to facilitate appropriate healing after division and inset of the proximal and distal interpolation flap.
Secondary Intention Text (Leave Blank If You Do Not Want): The defect will heal with secondary intention.
No Repair - Repaired With Adjacent Surgical Defect Text (Leave Blank If You Do Not Want): After obtaining clear surgical margins the defect was repaired concurrently with another surgical defect which was in close approximation.
Referred To Oculoplastics For Closure Text (Leave Blank If You Do Not Want): After obtaining clear surgical margins the patient was sent to oculoplastics for surgical repair.  The patient understands they will receive post-surgical care and follow-up from the referring physician's office.
Referred To Otolaryngology For Closure Text (Leave Blank If You Do Not Want): After obtaining clear surgical margins the patient was sent to otolaryngology for surgical repair.  The patient understands they will receive post-surgical care and follow-up from the referring physician's office.
Referred To Plastics For Closure Text (Leave Blank If You Do Not Want): After obtaining clear surgical margins the patient was sent to plastics for surgical repair.  The patient understands they will receive post-surgical care and follow-up from the referring physician's office.
Referred To Asc For Closure Text (Leave Blank If You Do Not Want): After obtaining clear surgical margins the patient was sent to an ASC for surgical repair.  The patient understands they will receive post-surgical care and follow-up from the ASC physician.
Referred To Mid-Level For Closure Text (Leave Blank If You Do Not Want): After obtaining clear surgical margins the patient was sent to a mid-level provider for surgical repair.  The patient understands they will receive post-surgical care and follow-up from the mid-level provider.
Repair Performed By Another Provider Text (Leave Blank If You Do Not Want): After obtaining clear surgical margins the defect was repaired by another provider.
Adjacent Tissue Transfer Text: The defect edges were debeveled with a #15 scalpel blade.  Given the location of the defect and the proximity to free margins an adjacent tissue transfer was deemed most appropriate.  Using a sterile surgical marker, an appropriate flap was drawn incorporating the defect and placing the expected incisions within the relaxed skin tension lines where possible.    The area thus outlined was incised deep to adipose tissue with a #15 scalpel blade.  The skin margins were undermined to an appropriate distance in all directions utilizing iris scissors.
Advancement Flap (Single) Text: The defect edges were debeveled with a #15 scalpel blade.  Given the location of the defect and the proximity to free margins a single advancement flap was deemed most appropriate.  Using a sterile surgical marker, an appropriate advancement flap was drawn incorporating the defect and placing the expected incisions within the relaxed skin tension lines where possible.    The area thus outlined was incised deep to adipose tissue with a #15 scalpel blade.  The skin margins were undermined to an appropriate distance in all directions utilizing iris scissors.
Advancement Flap (Double) Text: The defect edges were debeveled with a #15 scalpel blade.  Given the location of the defect and the proximity to free margins a double advancement flap was deemed most appropriate.  Using a sterile surgical marker, the appropriate advancement flaps were drawn incorporating the defect and placing the expected incisions within the relaxed skin tension lines where possible.    The area thus outlined was incised deep to adipose tissue with a #15 scalpel blade.  The skin margins were undermined to an appropriate distance in all directions utilizing iris scissors.
Burow's Advancement Flap Text: The defect edges were debeveled with a #15 scalpel blade.  Given the location of the defect and the proximity to free margins a Burow's advancement flap was deemed most appropriate.  Using a sterile surgical marker, the appropriate advancement flap was drawn incorporating the defect and placing the expected incisions within the relaxed skin tension lines where possible.    The area thus outlined was incised deep to adipose tissue with a #15 scalpel blade.  The skin margins were undermined to an appropriate distance in all directions utilizing iris scissors.
Chonodrocutaneous Helical Advancement Flap Text: The defect edges were debeveled with a #15 scalpel blade.  Given the location of the defect and the proximity to free margins a chondrocutaneous helical advancement flap was deemed most appropriate.  Using a sterile surgical marker, the appropriate advancement flap was drawn incorporating the defect and placing the expected incisions within the relaxed skin tension lines where possible.    The area thus outlined was incised deep to adipose tissue with a #15 scalpel blade.  The skin margins were undermined to an appropriate distance in all directions utilizing iris scissors.
Crescentic Advancement Flap Text: The defect edges were debeveled with a #15 scalpel blade.  Given the location of the defect and the proximity to free margins a crescentic advancement flap was deemed most appropriate.  Using a sterile surgical marker, the appropriate advancement flap was drawn incorporating the defect and placing the expected incisions within the relaxed skin tension lines where possible.    The area thus outlined was incised deep to adipose tissue with a #15 scalpel blade.  The skin margins were undermined to an appropriate distance in all directions utilizing iris scissors.
A-T Advancement Flap Text: The defect edges were debeveled with a #15 scalpel blade.  Given the location of the defect, shape of the defect and the proximity to free margins an A-T advancement flap was deemed most appropriate.  Using a sterile surgical marker, an appropriate advancement flap was drawn incorporating the defect and placing the expected incisions within the relaxed skin tension lines where possible.    The area thus outlined was incised deep to adipose tissue with a #15 scalpel blade.  The skin margins were undermined to an appropriate distance in all directions utilizing iris scissors.
O-T Advancement Flap Text: The defect edges were debeveled with a #15 scalpel blade.  Given the location of the defect, shape of the defect and the proximity to free margins an O-T advancement flap was deemed most appropriate.  Using a sterile surgical marker, an appropriate advancement flap was drawn incorporating the defect and placing the expected incisions within the relaxed skin tension lines where possible.    The area thus outlined was incised deep to adipose tissue with a #15 scalpel blade.  The skin margins were undermined to an appropriate distance in all directions utilizing iris scissors.
O-L Flap Text: The defect edges were debeveled with a #15 scalpel blade.  Given the location of the defect, shape of the defect and the proximity to free margins an O-L flap was deemed most appropriate.  Using a sterile surgical marker, an appropriate advancement flap was drawn incorporating the defect and placing the expected incisions within the relaxed skin tension lines where possible.    The area thus outlined was incised deep to adipose tissue with a #15 scalpel blade.  The skin margins were undermined to an appropriate distance in all directions utilizing iris scissors.
O-Z Flap Text: The defect edges were debeveled with a #15 scalpel blade.  Given the location of the defect, shape of the defect and the proximity to free margins an O-Z flap was deemed most appropriate.  Using a sterile surgical marker, an appropriate transposition flap was drawn incorporating the defect and placing the expected incisions within the relaxed skin tension lines where possible. The area thus outlined was incised deep to adipose tissue with a #15 scalpel blade.  The skin margins were undermined to an appropriate distance in all directions utilizing iris scissors.
Double O-Z Flap Text: The defect edges were debeveled with a #15 scalpel blade.  Given the location of the defect, shape of the defect and the proximity to free margins a Double O-Z flap was deemed most appropriate.  Using a sterile surgical marker, an appropriate transposition flap was drawn incorporating the defect and placing the expected incisions within the relaxed skin tension lines where possible. The area thus outlined was incised deep to adipose tissue with a #15 scalpel blade.  The skin margins were undermined to an appropriate distance in all directions utilizing iris scissors.
V-Y Flap Text: The defect edges were debeveled with a #15 scalpel blade.  Given the location of the defect, shape of the defect and the proximity to free margins a V-Y flap was deemed most appropriate.  Using a sterile surgical marker, an appropriate advancement flap was drawn incorporating the defect and placing the expected incisions within the relaxed skin tension lines where possible.    The area thus outlined was incised deep to adipose tissue with a #15 scalpel blade.  The skin margins were undermined to an appropriate distance in all directions utilizing iris scissors.
Advancement-Rotation Flap Text: The defect edges were debeveled with a #15 scalpel blade.  Given the location of the defect, shape of the defect and the proximity to free margins an advancement-rotation flap was deemed most appropriate.  Using a sterile surgical marker, an appropriate advancement flap was drawn incorporating the defect and placing the expected incisions within the relaxed skin tension lines where possible.    The area thus outlined was incised deep to adipose tissue with a #15 scalpel blade.  The skin margins were undermined to an appropriate distance in all directions utilizing iris scissors.
Mercedes Flap Text: The defect edges were debeveled with a #15 scalpel blade.  Given the location of the defect, shape of the defect and the proximity to free margins a Mercedes flap was deemed most appropriate.  Using a sterile surgical marker, an appropriate advancement flap was drawn incorporating the defect and placing the expected incisions within the relaxed skin tension lines where possible. The area thus outlined was incised deep to adipose tissue with a #15 scalpel blade.  The skin margins were undermined to an appropriate distance in all directions utilizing iris scissors.
Modified Advancement Flap Text: The defect edges were debeveled with a #15 scalpel blade.  Given the location of the defect, shape of the defect and the proximity to free margins a modified advancement flap was deemed most appropriate.  Using a sterile surgical marker, an appropriate advancement flap was drawn incorporating the defect and placing the expected incisions within the relaxed skin tension lines where possible.    The area thus outlined was incised deep to adipose tissue with a #15 scalpel blade.  The skin margins were undermined to an appropriate distance in all directions utilizing iris scissors.
Mucosal Advancement Flap Text: Given the location of the defect, shape of the defect and the proximity to free margins a mucosal advancement flap was deemed most appropriate. Incisions were made with a 15 blade scalpel in the appropriate fashion along the cutaneous vermillion border and the mucosal lip. The remaining actinically damaged mucosal tissue was excised.  The mucosal advancement flap was then elevated to the gingival sulcus with care taken to preserve the neurovascular structures and advanced into the primary defect. Care was taken to ensure that precise realignment of the vermillion border was achieved.
Peng Advancement Flap Text: The defect edges were debeveled with a #15 scalpel blade.  Given the location of the defect, shape of the defect and the proximity to free margins a Peng advancement flap was deemed most appropriate.  Using a sterile surgical marker, an appropriate advancement flap was drawn incorporating the defect and placing the expected incisions within the relaxed skin tension lines where possible. The area thus outlined was incised deep to adipose tissue with a #15 scalpel blade.  The skin margins were undermined to an appropriate distance in all directions utilizing iris scissors.
Hatchet Flap Text: The defect edges were debeveled with a #15 scalpel blade.  Given the location of the defect, shape of the defect and the proximity to free margins a hatchet flap was deemed most appropriate.  Using a sterile surgical marker, an appropriate hatchet flap was drawn incorporating the defect and placing the expected incisions within the relaxed skin tension lines where possible.    The area thus outlined was incised deep to adipose tissue with a #15 scalpel blade.  The skin margins were undermined to an appropriate distance in all directions utilizing iris scissors.
Rotation Flap Text: The defect edges were debeveled with a #15 scalpel blade.  Given the location of the defect, shape of the defect and the proximity to free margins a rotation flap was deemed most appropriate.  Using a sterile surgical marker, an appropriate rotation flap was drawn incorporating the defect and placing the expected incisions within the relaxed skin tension lines where possible.    The area thus outlined was incised deep to adipose tissue with a #15 scalpel blade.  The skin margins were undermined to an appropriate distance in all directions utilizing iris scissors.
Spiral Flap Text: The defect edges were debeveled with a #15 scalpel blade.  Given the location of the defect, shape of the defect and the proximity to free margins a spiral flap was deemed most appropriate.  Using a sterile surgical marker, an appropriate rotation flap was drawn incorporating the defect and placing the expected incisions within the relaxed skin tension lines where possible. The area thus outlined was incised deep to adipose tissue with a #15 scalpel blade.  The skin margins were undermined to an appropriate distance in all directions utilizing iris scissors.
Staged Advancement Flap Text: The defect edges were debeveled with a #15 scalpel blade.  Given the location of the defect, shape of the defect and the proximity to free margins a staged advancement flap was deemed most appropriate.  Using a sterile surgical marker, an appropriate advancement flap was drawn incorporating the defect and placing the expected incisions within the relaxed skin tension lines where possible. The area thus outlined was incised deep to adipose tissue with a #15 scalpel blade.  The skin margins were undermined to an appropriate distance in all directions utilizing iris scissors.
Star Wedge Flap Text: The defect edges were debeveled with a #15 scalpel blade.  Given the location of the defect, shape of the defect and the proximity to free margins a star wedge flap was deemed most appropriate.  Using a sterile surgical marker, an appropriate rotation flap was drawn incorporating the defect and placing the expected incisions within the relaxed skin tension lines where possible. The area thus outlined was incised deep to adipose tissue with a #15 scalpel blade.  The skin margins were undermined to an appropriate distance in all directions utilizing iris scissors.
Transposition Flap Text: The defect edges were debeveled with a #15 scalpel blade.  Given the location of the defect and the proximity to free margins a transposition flap was deemed most appropriate.  Using a sterile surgical marker, an appropriate transposition flap was drawn incorporating the defect.    The area thus outlined was incised deep to adipose tissue with a #15 scalpel blade.  The skin margins were undermined to an appropriate distance in all directions utilizing iris scissors.
Muscle Hinge Flap Text: The defect edges were debeveled with a #15 scalpel blade.  Given the size, depth and location of the defect and the proximity to free margins a muscle hinge flap was deemed most appropriate.  Using a sterile surgical marker, an appropriate hinge flap was drawn incorporating the defect. The area thus outlined was incised with a #15 scalpel blade.  The skin margins were undermined to an appropriate distance in all directions utilizing iris scissors.
Mustarde Flap Text: The defect edges were debeveled with a #15 scalpel blade.  Given the size, depth and location of the defect and the proximity to free margins a Mustarde flap was deemed most appropriate.  Using a sterile surgical marker, an appropriate flap was drawn incorporating the defect. The area thus outlined was incised with a #15 scalpel blade.  The skin margins were undermined to an appropriate distance in all directions utilizing iris scissors.
Nasal Turnover Hinge Flap Text: The defect edges were debeveled with a #15 scalpel blade.  Given the size, depth, location of the defect and the defect being full thickness a nasal turnover hinge flap was deemed most appropriate.  Using a sterile surgical marker, an appropriate hinge flap was drawn incorporating the defect. The area thus outlined was incised with a #15 scalpel blade. The flap was designed to recreate the nasal mucosal lining and the alar rim. The skin margins were undermined to an appropriate distance in all directions utilizing iris scissors.
Nasalis-Muscle-Based Myocutaneous Island Pedicle Flap Text: Using a #15 blade, an incision was made around the donor flap to the level of the nasalis muscle. Wide lateral undermining was then performed in both the subcutaneous plane above the nasalis muscle, and in a submuscular plane just above periosteum. This allowed the formation of a free nasalis muscle axial pedicle (based on the angular artery) which was still attached to the actual cutaneous flap, increasing its mobility and vascular viability. Hemostasis was obtained with pinpoint electrocoagulation. The flap was mobilized into position and the pivotal anchor points positioned and stabilized with buried interrupted sutures. Subcutaneous and dermal tissues were closed in a multilayered fashion with sutures. Tissue redundancies were excised, and the epidermal edges were apposed without significant tension and sutured with sutures.
Orbicularis Oris Muscle Flap Text: The defect edges were debeveled with a #15 scalpel blade.  Given that the defect affected the competency of the oral sphincter an obicularis oris muscle flap was deemed most appropriate to restore this competency and normal muscle function.  Using a sterile surgical marker, an appropriate flap was drawn incorporating the defect. The area thus outlined was incised with a #15 scalpel blade.
Melolabial Transposition Flap Text: The defect edges were debeveled with a #15 scalpel blade.  Given the location of the defect and the proximity to free margins a melolabial flap was deemed most appropriate.  Using a sterile surgical marker, an appropriate melolabial transposition flap was drawn incorporating the defect.    The area thus outlined was incised deep to adipose tissue with a #15 scalpel blade.  The skin margins were undermined to an appropriate distance in all directions utilizing iris scissors.
Rhombic Flap Text: The defect edges were debeveled with a #15 scalpel blade.  Given the location of the defect and the proximity to free margins a rhombic flap was deemed most appropriate.  Using a sterile surgical marker, an appropriate rhombic flap was drawn incorporating the defect.    The area thus outlined was incised deep to adipose tissue with a #15 scalpel blade.  The skin margins were undermined to an appropriate distance in all directions utilizing iris scissors.
Rhomboid Transposition Flap Text: The defect edges were debeveled with a #15 scalpel blade.  Given the location of the defect and the proximity to free margins a rhomboid transposition flap was deemed most appropriate.  Using a sterile surgical marker, an appropriate rhomboid flap was drawn incorporating the defect.    The area thus outlined was incised deep to adipose tissue with a #15 scalpel blade.  The skin margins were undermined to an appropriate distance in all directions utilizing iris scissors.
Bi-Rhombic Flap Text: The defect edges were debeveled with a #15 scalpel blade.  Given the location of the defect and the proximity to free margins a bi-rhombic flap was deemed most appropriate.  Using a sterile surgical marker, an appropriate rhombic flap was drawn incorporating the defect. The area thus outlined was incised deep to adipose tissue with a #15 scalpel blade.  The skin margins were undermined to an appropriate distance in all directions utilizing iris scissors.
Helical Rim Advancement Flap Text: The defect edges were debeveled with a #15 blade scalpel.  Given the location of the defect and the proximity to free margins (helical rim) a double helical rim advancement flap was deemed most appropriate.  Using a sterile surgical marker, the appropriate advancement flaps were drawn incorporating the defect and placing the expected incisions between the helical rim and antihelix where possible.  The area thus outlined was incised through and through with a #15 scalpel blade.  With a skin hook and iris scissors, the flaps were gently and sharply undermined and freed up.
Bilateral Helical Rim Advancement Flap Text: The defect edges were debeveled with a #15 blade scalpel.  Given the location of the defect and the proximity to free margins (helical rim) a bilateral helical rim advancement flap was deemed most appropriate.  Using a sterile surgical marker, the appropriate advancement flaps were drawn incorporating the defect and placing the expected incisions between the helical rim and antihelix where possible.  The area thus outlined was incised through and through with a #15 scalpel blade.  With a skin hook and iris scissors, the flaps were gently and sharply undermined and freed up.
Ear Star Wedge Flap Text: The defect edges were debeveled with a #15 blade scalpel.  Given the location of the defect and the proximity to free margins (helical rim) an ear star wedge flap was deemed most appropriate.  Using a sterile surgical marker, the appropriate flap was drawn incorporating the defect and placing the expected incisions between the helical rim and antihelix where possible.  The area thus outlined was incised through and through with a #15 scalpel blade.
Banner Transposition Flap Text: The defect edges were debeveled with a #15 scalpel blade.  Given the location of the defect and the proximity to free margins a Banner transposition flap was deemed most appropriate.  Using a sterile surgical marker, an appropriate flap drawn around the defect. The area thus outlined was incised deep to adipose tissue with a #15 scalpel blade.  The skin margins were undermined to an appropriate distance in all directions utilizing iris scissors.
Bilobed Flap Text: The defect edges were debeveled with a #15 scalpel blade.  Given the location of the defect and the proximity to free margins a bilobe flap was deemed most appropriate.  Using a sterile surgical marker, an appropriate bilobe flap drawn around the defect.    The area thus outlined was incised deep to adipose tissue with a #15 scalpel blade.  The skin margins were undermined to an appropriate distance in all directions utilizing iris scissors.
Bilobed Transposition Flap Text: The defect edges were debeveled with a #15 scalpel blade.  Given the location of the defect and the proximity to free margins a bilobed transposition flap was deemed most appropriate.  Using a sterile surgical marker, an appropriate bilobe flap drawn around the defect.    The area thus outlined was incised deep to adipose tissue with a #15 scalpel blade.  The skin margins were undermined to an appropriate distance in all directions utilizing iris scissors.
Trilobed Flap Text: The defect edges were debeveled with a #15 scalpel blade.  Given the location of the defect and the proximity to free margins a trilobed flap was deemed most appropriate.  Using a sterile surgical marker, an appropriate trilobed flap drawn around the defect.    The area thus outlined was incised deep to adipose tissue with a #15 scalpel blade.  The skin margins were undermined to an appropriate distance in all directions utilizing iris scissors.
Dorsal Nasal Flap Text: The defect edges were debeveled with a #15 scalpel blade.  Given the location of the defect and the proximity to free margins a dorsal nasal flap was deemed most appropriate.  Using a sterile surgical marker, an appropriate dorsal nasal flap was drawn around the defect.    The area thus outlined was incised deep to adipose tissue with a #15 scalpel blade.  The skin margins were undermined to an appropriate distance in all directions utilizing iris scissors.
Island Pedicle Flap Text: The defect edges were debeveled with a #15 scalpel blade.  Given the location of the defect, shape of the defect and the proximity to free margins an island pedicle advancement flap was deemed most appropriate.  Using a sterile surgical marker, an appropriate advancement flap was drawn incorporating the defect, outlining the appropriate donor tissue and placing the expected incisions within the relaxed skin tension lines where possible.    The area thus outlined was incised deep to adipose tissue with a #15 scalpel blade.  The skin margins were undermined to an appropriate distance in all directions around the primary defect and laterally outward around the island pedicle utilizing iris scissors.  There was minimal undermining beneath the pedicle flap.
Island Pedicle Flap With Canthal Suspension Text: The defect edges were debeveled with a #15 scalpel blade.  Given the location of the defect, shape of the defect and the proximity to free margins an island pedicle advancement flap was deemed most appropriate.  Using a sterile surgical marker, an appropriate advancement flap was drawn incorporating the defect, outlining the appropriate donor tissue and placing the expected incisions within the relaxed skin tension lines where possible. The area thus outlined was incised deep to adipose tissue with a #15 scalpel blade.  The skin margins were undermined to an appropriate distance in all directions around the primary defect and laterally outward around the island pedicle utilizing iris scissors.  There was minimal undermining beneath the pedicle flap. A suspension suture was placed in the canthal tendon to prevent tension and prevent ectropion.
Alar Island Pedicle Flap Text: The defect edges were debeveled with a #15 scalpel blade.  Given the location of the defect, shape of the defect and the proximity to the alar rim an island pedicle advancement flap was deemed most appropriate.  Using a sterile surgical marker, an appropriate advancement flap was drawn incorporating the defect, outlining the appropriate donor tissue and placing the expected incisions within the nasal ala running parallel to the alar rim. The area thus outlined was incised with a #15 scalpel blade.  The skin margins were undermined minimally to an appropriate distance in all directions around the primary defect and laterally outward around the island pedicle utilizing iris scissors.  There was minimal undermining beneath the pedicle flap.
Double Island Pedicle Flap Text: The defect edges were debeveled with a #15 scalpel blade.  Given the location of the defect, shape of the defect and the proximity to free margins a double island pedicle advancement flap was deemed most appropriate.  Using a sterile surgical marker, an appropriate advancement flap was drawn incorporating the defect, outlining the appropriate donor tissue and placing the expected incisions within the relaxed skin tension lines where possible.    The area thus outlined was incised deep to adipose tissue with a #15 scalpel blade.  The skin margins were undermined to an appropriate distance in all directions around the primary defect and laterally outward around the island pedicle utilizing iris scissors.  There was minimal undermining beneath the pedicle flap.
Island Pedicle Flap-Requiring Vessel Identification Text: The defect edges were debeveled with a #15 scalpel blade.  Given the location of the defect, shape of the defect and the proximity to free margins an island pedicle advancement flap was deemed most appropriate.  Using a sterile surgical marker, an appropriate advancement flap was drawn, based on the axial vessel mentioned above, incorporating the defect, outlining the appropriate donor tissue and placing the expected incisions within the relaxed skin tension lines where possible.    The area thus outlined was incised deep to adipose tissue with a #15 scalpel blade.  The skin margins were undermined to an appropriate distance in all directions around the primary defect and laterally outward around the island pedicle utilizing iris scissors.  There was minimal undermining beneath the pedicle flap.
Keystone Flap Text: The defect edges were debeveled with a #15 scalpel blade.  Given the location of the defect, shape of the defect a keystone flap was deemed most appropriate.  Using a sterile surgical marker, an appropriate keystone flap was drawn incorporating the defect, outlining the appropriate donor tissue and placing the expected incisions within the relaxed skin tension lines where possible. The area thus outlined was incised deep to adipose tissue with a #15 scalpel blade.  The skin margins were undermined to an appropriate distance in all directions around the primary defect and laterally outward around the flap utilizing iris scissors.
O-T Plasty Text: The defect edges were debeveled with a #15 scalpel blade.  Given the location of the defect, shape of the defect and the proximity to free margins an O-T plasty was deemed most appropriate.  Using a sterile surgical marker, an appropriate O-T plasty was drawn incorporating the defect and placing the expected incisions within the relaxed skin tension lines where possible.    The area thus outlined was incised deep to adipose tissue with a #15 scalpel blade.  The skin margins were undermined to an appropriate distance in all directions utilizing iris scissors.
O-Z Plasty Text: The defect edges were debeveled with a #15 scalpel blade.  Given the location of the defect, shape of the defect and the proximity to free margins an O-Z plasty (double transposition flap) was deemed most appropriate.  Using a sterile surgical marker, the appropriate transposition flaps were drawn incorporating the defect and placing the expected incisions within the relaxed skin tension lines where possible.    The area thus outlined was incised deep to adipose tissue with a #15 scalpel blade.  The skin margins were undermined to an appropriate distance in all directions utilizing iris scissors.  Hemostasis was achieved with electrocautery.  The flaps were then transposed into place, one clockwise and the other counterclockwise, and anchored with interrupted buried subcutaneous sutures.
Double O-Z Plasty Text: The defect edges were debeveled with a #15 scalpel blade.  Given the location of the defect, shape of the defect and the proximity to free margins a Double O-Z plasty (double transposition flap) was deemed most appropriate.  Using a sterile surgical marker, the appropriate transposition flaps were drawn incorporating the defect and placing the expected incisions within the relaxed skin tension lines where possible. The area thus outlined was incised deep to adipose tissue with a #15 scalpel blade.  The skin margins were undermined to an appropriate distance in all directions utilizing iris scissors.  Hemostasis was achieved with electrocautery.  The flaps were then transposed into place, one clockwise and the other counterclockwise, and anchored with interrupted buried subcutaneous sutures.
V-Y Plasty Text: The defect edges were debeveled with a #15 scalpel blade.  Given the location of the defect, shape of the defect and the proximity to free margins an V-Y advancement flap was deemed most appropriate.  Using a sterile surgical marker, an appropriate advancement flap was drawn incorporating the defect and placing the expected incisions within the relaxed skin tension lines where possible.    The area thus outlined was incised deep to adipose tissue with a #15 scalpel blade.  The skin margins were undermined to an appropriate distance in all directions utilizing iris scissors.
H Plasty Text: Given the location of the defect, shape of the defect and the proximity to free margins a H-plasty was deemed most appropriate for repair.  Using a sterile surgical marker, the appropriate advancement arms of the H-plasty were drawn incorporating the defect and placing the expected incisions within the relaxed skin tension lines where possible. The area thus outlined was incised deep to adipose tissue with a #15 scalpel blade. The skin margins were undermined to an appropriate distance in all directions utilizing iris scissors.  The opposing advancement arms were then advanced into place in opposite direction and anchored with interrupted buried subcutaneous sutures.
W Plasty Text: The lesion was extirpated to the level of the fat with a #15 scalpel blade.  Given the location of the defect, shape of the defect and the proximity to free margins a W-plasty was deemed most appropriate for repair.  Using a sterile surgical marker, the appropriate transposition arms of the W-plasty were drawn incorporating the defect and placing the expected incisions within the relaxed skin tension lines where possible.    The area thus outlined was incised deep to adipose tissue with a #15 scalpel blade.  The skin margins were undermined to an appropriate distance in all directions utilizing iris scissors.  The opposing transposition arms were then transposed into place in opposite direction and anchored with interrupted buried subcutaneous sutures.
Z Plasty Text: The lesion was extirpated to the level of the fat with a #15 scalpel blade.  Given the location of the defect, shape of the defect and the proximity to free margins a Z-plasty was deemed most appropriate for repair.  Using a sterile surgical marker, the appropriate transposition arms of the Z-plasty were drawn incorporating the defect and placing the expected incisions within the relaxed skin tension lines where possible.    The area thus outlined was incised deep to adipose tissue with a #15 scalpel blade.  The skin margins were undermined to an appropriate distance in all directions utilizing iris scissors.  The opposing transposition arms were then transposed into place in opposite direction and anchored with interrupted buried subcutaneous sutures.
Zygomaticofacial Flap Text: Given the location of the defect, shape of the defect and the proximity to free margins a zygomaticofacial flap was deemed most appropriate for repair.  Using a sterile surgical marker, the appropriate flap was drawn incorporating the defect and placing the expected incisions within the relaxed skin tension lines where possible. The area thus outlined was incised deep to adipose tissue with a #15 scalpel blade with preservation of a vascular pedicle.  The skin margins were undermined to an appropriate distance in all directions utilizing iris scissors.  The flap was then placed into the defect and anchored with interrupted buried subcutaneous sutures.
Cheek Interpolation Flap Text: A decision was made to reconstruct the defect utilizing an interpolation axial flap and a staged reconstruction.  A telfa template was made of the defect.  This telfa template was then used to outline the Cheek Interpolation flap.  The donor area for the pedicle flap was then injected with anesthesia.  The flap was excised through the skin and subcutaneous tissue down to the layer of the underlying musculature.  The interpolation flap was carefully excised within this deep plane to maintain its blood supply.  The edges of the donor site were undermined.   The donor site was closed in a primary fashion.  The pedicle was then rotated into position and sutured.  Once the tube was sutured into place, adequate blood supply was confirmed with blanching and refill.  The pedicle was then wrapped with xeroform gauze and dressed appropriately with a telfa and gauze bandage to ensure continued blood supply and protect the attached pedicle.
Cheek-To-Nose Interpolation Flap Text: A decision was made to reconstruct the defect utilizing an interpolation axial flap and a staged reconstruction.  A telfa template was made of the defect.  This telfa template was then used to outline the Cheek-To-Nose Interpolation flap.  The donor area for the pedicle flap was then injected with anesthesia.  The flap was excised through the skin and subcutaneous tissue down to the layer of the underlying musculature.  The interpolation flap was carefully excised within this deep plane to maintain its blood supply.  The edges of the donor site were undermined.   The donor site was closed in a primary fashion.  The pedicle was then rotated into position and sutured.  Once the tube was sutured into place, adequate blood supply was confirmed with blanching and refill.  The pedicle was then wrapped with xeroform gauze and dressed appropriately with a telfa and gauze bandage to ensure continued blood supply and protect the attached pedicle.
Interpolation Flap Text: A decision was made to reconstruct the defect utilizing an interpolation axial flap and a staged reconstruction.  A telfa template was made of the defect.  This telfa template was then used to outline the interpolation flap.  The donor area for the pedicle flap was then injected with anesthesia.  The flap was excised through the skin and subcutaneous tissue down to the layer of the underlying musculature.  The interpolation flap was carefully excised within this deep plane to maintain its blood supply.  The edges of the donor site were undermined.   The donor site was closed in a primary fashion.  The pedicle was then rotated into position and sutured.  Once the tube was sutured into place, adequate blood supply was confirmed with blanching and refill.  The pedicle was then wrapped with xeroform gauze and dressed appropriately with a telfa and gauze bandage to ensure continued blood supply and protect the attached pedicle.
Melolabial Interpolation Flap Text: A decision was made to reconstruct the defect utilizing an interpolation axial flap and a staged reconstruction.  A telfa template was made of the defect.  This telfa template was then used to outline the melolabial interpolation flap.  The donor area for the pedicle flap was then injected with anesthesia.  The flap was excised through the skin and subcutaneous tissue down to the layer of the underlying musculature.  The pedicle flap was carefully excised within this deep plane to maintain its blood supply.  The edges of the donor site were undermined.   The donor site was closed in a primary fashion.  The pedicle was then rotated into position and sutured.  Once the tube was sutured into place, adequate blood supply was confirmed with blanching and refill.  The pedicle was then wrapped with xeroform gauze and dressed appropriately with a telfa and gauze bandage to ensure continued blood supply and protect the attached pedicle.
Mastoid Interpolation Flap Text: A decision was made to reconstruct the defect utilizing an interpolation axial flap and a staged reconstruction.  A telfa template was made of the defect.  This telfa template was then used to outline the mastoid interpolation flap.  The donor area for the pedicle flap was then injected with anesthesia.  The flap was excised through the skin and subcutaneous tissue down to the layer of the underlying musculature.  The pedicle flap was carefully excised within this deep plane to maintain its blood supply.  The edges of the donor site were undermined.   The donor site was closed in a primary fashion.  The pedicle was then rotated into position and sutured.  Once the tube was sutured into place, adequate blood supply was confirmed with blanching and refill.  The pedicle was then wrapped with xeroform gauze and dressed appropriately with a telfa and gauze bandage to ensure continued blood supply and protect the attached pedicle.
Posterior Auricular Interpolation Flap Text: A decision was made to reconstruct the defect utilizing an interpolation axial flap and a staged reconstruction.  A telfa template was made of the defect.  This telfa template was then used to outline the posterior auricular interpolation flap.  The donor area for the pedicle flap was then injected with anesthesia.  The flap was excised through the skin and subcutaneous tissue down to the layer of the underlying musculature.  The pedicle flap was carefully excised within this deep plane to maintain its blood supply.  The edges of the donor site were undermined.   The donor site was closed in a primary fashion.  The pedicle was then rotated into position and sutured.  Once the tube was sutured into place, adequate blood supply was confirmed with blanching and refill.  The pedicle was then wrapped with xeroform gauze and dressed appropriately with a telfa and gauze bandage to ensure continued blood supply and protect the attached pedicle.
Paramedian Forehead Flap Text: A decision was made to reconstruct the defect utilizing an interpolation axial flap and a staged reconstruction.  A telfa template was made of the defect.  This telfa template was then used to outline the paramedian forehead pedicle flap.  The donor area for the pedicle flap was then injected with anesthesia.  The flap was excised through the skin and subcutaneous tissue down to the layer of the underlying musculature.  The pedicle flap was carefully excised within this deep plane to maintain its blood supply.  The edges of the donor site were undermined.   The donor site was closed in a primary fashion.  The pedicle was then rotated into position and sutured.  Once the tube was sutured into place, adequate blood supply was confirmed with blanching and refill.  The pedicle was then wrapped with xeroform gauze and dressed appropriately with a telfa and gauze bandage to ensure continued blood supply and protect the attached pedicle.
Abbe Flap (Upper To Lower Lip) Text: The defect of the lower lip was assessed and measured.  Given the location and size of the defect, an Abbe flap was deemed most appropriate.  Using a sterile surgical marker, an appropriate Abbe flap was measured and drawn on the upper lip. Local anesthesia was then infiltrated.  A scalpel was then used to incise the upper lip through and through the skin, vermilion, muscle and mucosa, leaving the flap pedicled on the opposite side.  The flap was then rotated and transferred to the lower lip defect.  The flap was then sutured into place with a three layer technique, closing the orbicularis oris muscle layer with subcutaneous buried sutures, followed by a mucosal layer and an epidermal layer.
Abbe Flap (Lower To Upper Lip) Text: The defect of the upper lip was assessed and measured.  Given the location and size of the defect, an Abbe flap was deemed most appropriate.  Using a sterile surgical marker, an appropriate Abbe flap was measured and drawn on the lower lip. Local anesthesia was then infiltrated. A scalpel was then used to incise the upper lip through and through the skin, vermilion, muscle and mucosa, leaving the flap pedicled on the opposite side.  The flap was then rotated and transferred to the lower lip defect.  The flap was then sutured into place with a three layer technique, closing the orbicularis oris muscle layer with subcutaneous buried sutures, followed by a mucosal layer and an epidermal layer.
Estlander Flap (Upper To Lower Lip) Text: The defect of the lower lip was assessed and measured.  Given the location and size of the defect, an Estlander flap was deemed most appropriate.  Using a sterile surgical marker, an appropriate Estlander flap was measured and drawn on the upper lip. Local anesthesia was then infiltrated. A scalpel was then used to incise the lateral aspect of the flap, through skin, muscle and mucosa, leaving the flap pedicled medially.  The flap was then rotated and positioned to fill the lower lip defect.  The flap was then sutured into place with a three layer technique, closing the orbicularis oris muscle layer with subcutaneous buried sutures, followed by a mucosal layer and an epidermal layer.
Cheiloplasty (Less Than 50%) Text: A decision was made to reconstruct the defect with a  cheiloplasty.  The defect was undermined extensively.  Additional obicularis oris muscle was excised with a 15 blade scalpel.  The defect was converted into a full thickness wedge, of less than 50% of the vertical height of the lip, to facilite a better cosmetic result.  Small vessels were then tied off with 5-0 monocyrl. The obicularis oris, superficial fascia, adipose and dermis were then reapproximated.  After the deeper layers were approximated the epidermis was reapproximated with particular care given to realign the vermillion border.
Cheiloplasty (Complex) Text: A decision was made to reconstruct the defect with a  cheiloplasty.  The defect was undermined extensively.  Additional obicularis oris muscle was excised with a 15 blade scalpel.  The defect was converted into a full thickness wedge to facilite a better cosmetic result.  Small vessels were then tied off with 5-0 monocyrl. The obicularis oris, superficial fascia, adipose and dermis were then reapproximated.  After the deeper layers were approximated the epidermis was reapproximated with particular care given to realign the vermillion border.
Ear Wedge Repair Text: A wedge excision was completed by carrying down an excision through the full thickness of the ear and cartilage with an inward facing Burow's triangle. The wound was then closed in a layered fashion.
Full Thickness Lip Wedge Repair (Flap) Text: Given the location of the defect and the proximity to free margins a full thickness wedge repair was deemed most appropriate.  Using a sterile surgical marker, the appropriate repair was drawn incorporating the defect and placing the expected incisions perpendicular to the vermilion border.  The vermilion border was also meticulously outlined to ensure appropriate reapproximation during the repair.  The area thus outlined was incised through and through with a #15 scalpel blade.  The muscularis and dermis were reaproximated with deep sutures following hemostasis. Care was taken to realign the vermilion border before proceeding with the superficial closure.  Once the vermilion was realigned the superfical and mucosal closure was finished.
Ftsg Text: The defect edges were debeveled with a #15 scalpel blade.  Given the location of the defect, shape of the defect and the proximity to free margins a full thickness skin graft was deemed most appropriate.  Using a sterile surgical marker, the primary defect shape was transferred to the donor site. The area thus outlined was incised deep to adipose tissue with a #15 scalpel blade.  The harvested graft was then trimmed of adipose tissue until only dermis and epidermis was left.  The skin margins of the secondary defect were undermined to an appropriate distance in all directions utilizing iris scissors.  The secondary defect was closed with interrupted buried subcutaneous sutures.  The skin edges were then re-apposed with running  sutures.  The skin graft was then placed in the primary defect and oriented appropriately.
Split-Thickness Skin Graft Text: The defect edges were debeveled with a #15 scalpel blade.  Given the location of the defect, shape of the defect and the proximity to free margins a split thickness skin graft was deemed most appropriate.  Using a sterile surgical marker, the primary defect shape was transferred to the donor site. The split thickness graft was then harvested.  The skin graft was then placed in the primary defect and oriented appropriately.
Burow's Graft Text: The defect edges were debeveled with a #15 scalpel blade.  Given the location of the defect, shape of the defect, the proximity to free margins and the presence of a standing cone deformity a Burow's skin graft was deemed most appropriate. The standing cone was removed and this tissue was then trimmed to the shape of the primary defect. The adipose tissue was also removed until only dermis and epidermis were left.  The skin margins of the secondary defect were undermined to an appropriate distance in all directions utilizing iris scissors.  The secondary defect was closed with interrupted buried subcutaneous sutures.  The skin edges were then re-apposed with running  sutures.  The skin graft was then placed in the primary defect and oriented appropriately.
Cartilage Graft Text: The defect edges were debeveled with a #15 scalpel blade.  Given the location of the defect, shape of the defect, the fact the defect involved a full thickness cartilage defect a cartilage graft was deemed most appropriate.  An appropriate donor site was identified, cleansed, and anesthetized. The cartilage graft was then harvested and transferred to the recipient site, oriented appropriately and then sutured into place.  The secondary defect was then repaired using a primary closure.
Composite Graft Text: The defect edges were debeveled with a #15 scalpel blade.  Given the location of the defect, shape of the defect, the proximity to free margins and the fact the defect was full thickness a composite graft was deemed most appropriate.  The defect was outline and then transferred to the donor site.  A full thickness graft was then excised from the donor site. The graft was then placed in the primary defect, oriented appropriately and then sutured into place.  The secondary defect was then repaired using a primary closure.
Epidermal Autograft Text: The defect edges were debeveled with a #15 scalpel blade.  Given the location of the defect, shape of the defect and the proximity to free margins an epidermal autograft was deemed most appropriate.  Using a sterile surgical marker, the primary defect shape was transferred to the donor site. The epidermal graft was then harvested.  The skin graft was then placed in the primary defect and oriented appropriately.
Dermal Autograft Text: The defect edges were debeveled with a #15 scalpel blade.  Given the location of the defect, shape of the defect and the proximity to free margins a dermal autograft was deemed most appropriate.  Using a sterile surgical marker, the primary defect shape was transferred to the donor site. The area thus outlined was incised deep to adipose tissue with a #15 scalpel blade.  The harvested graft was then trimmed of adipose and epidermal tissue until only dermis was left.  The skin graft was then placed in the primary defect and oriented appropriately.
Skin Substitute Text: The defect edges were debeveled with a #15 scalpel blade.  Given the location of the defect, shape of the defect and the proximity to free margins a skin substitute graft was deemed most appropriate.  The graft material was trimmed to fit the size of the defect. The graft was then placed in the primary defect and oriented appropriately.
Skin Substitute Paste Text: The defect edges were debeveled with a #15 scalpel blade.  Given the location of the defect, shape of the defect and the proximity to free margins a skin substitute micronized graft was deemed most appropriate.  The entire vial contents were admixed with 0.5ccs of sterile saline, formed into a paste and then evenly spread over the entire wound bed.
Skin Substitute Injection Text: The defect edges were debeveled with a #15 scalpel blade.  Given the location of the defect, shape of the defect and the proximity to free margins a skin substitute micronized graft was deemed most appropriate.  The entire vial contents were admixed with 3.0ccs of sterile saline and then injected subcutaneously throughout the entire wound bed.
Tissue Cultured Epidermal Autograft Text: The defect edges were debeveled with a #15 scalpel blade.  Given the location of the defect, shape of the defect and the proximity to free margins a tissue cultured epidermal autograft was deemed most appropriate.  The graft was then trimmed to fit the size of the defect.  The graft was then placed in the primary defect and oriented appropriately.
Xenograft Text: The defect edges were debeveled with a #15 scalpel blade.  Given the location of the defect, shape of the defect and the proximity to free margins a xenograft was deemed most appropriate.  The graft was then trimmed to fit the size of the defect.  The graft was then placed in the primary defect and oriented appropriately.
Purse String (Simple) Text: Given the location of the defect and the characteristics of the surrounding skin a pursestring closure was deemed most appropriate.  Undermining was performed circumfirentially around the surgical defect.  A purstring suture was then placed and tightened.
Purse String (Intermediate) Text: Given the location of the defect and the characteristics of the surrounding skin a pursestring intermediate closure was deemed most appropriate.  Undermining was performed circumfirentially around the surgical defect.  A purstring suture was then placed and tightened.
Partial Purse String (Simple) Text: Given the location of the defect and the characteristics of the surrounding skin a simple purse string closure was deemed most appropriate.  Undermining was performed circumfirentially around the surgical defect.  A purse string suture was then placed and tightened. Wound tension only allowed a partial closure of the circular defect.
Partial Purse String (Intermediate) Text: Given the location of the defect and the characteristics of the surrounding skin an intermediate purse string closure was deemed most appropriate.  Undermining was performed circumfirentially around the surgical defect.  A purse string suture was then placed and tightened. Wound tension only allowed a partial closure of the circular defect.
Localized Dermabrasion With Wire Brush Text: The patient was draped in routine manner.  Localized dermabrasion using 3 x 17 mm wire brush was performed in routine manner to papillary dermis. This spot dermabrasion is being performed to complete skin cancer reconstruction. It also will eliminate the other sun damaged precancerous cells that are known to be part of the regional effect of a lifetime's worth of sun exposure. This localized dermabrasion is therapeutic and should not be considered cosmetic in any regard.
Tarsorrhaphy Text: A tarsorrhaphy was performed using Frost sutures.
Complex Repair And Flap Additional Text (Will Appearing After The Standard Complex Repair Text): The complex repair was not sufficient to completely close the primary defect. The remaining additional defect was repaired with the flap mentioned below.
Complex Repair And Graft Additional Text (Will Appearing After The Standard Complex Repair Text): The complex repair was not sufficient to completely close the primary defect. The remaining additional defect was repaired with the graft mentioned below.
Cheek Interpolation Flap Division And Inset Text: Division and inset of the cheek interpolation flap was performed to achieve optimal aesthetic result, restore normal anatomic appearance and avoid distortion of normal anatomy, expedite and facilitate wound healing, achieve optimal functional result and because linear closure either not possible or would produce suboptimal result. The patient was prepped and draped in the usual manner. The pedicle was infiltrated with local anesthesia. The pedicle was sectioned with a #15 blade. The pedicle was de-bulked and trimmed to match the shape of the defect. Hemostasis was achieved. The flap donor site and free margin of the flap were secured with deep buried sutures and the wound edges were re-approximated.
Cheek To Nose Interpolation Flap Division And Inset Text: Division and inset of the cheek to nose interpolation flap was performed to achieve optimal aesthetic result, restore normal anatomic appearance and avoid distortion of normal anatomy, expedite and facilitate wound healing, achieve optimal functional result and because linear closure either not possible or would produce suboptimal result. The patient was prepped and draped in the usual manner. The pedicle was infiltrated with local anesthesia. The pedicle was sectioned with a #15 blade. The pedicle was de-bulked and trimmed to match the shape of the defect. Hemostasis was achieved. The flap donor site and free margin of the flap were secured with deep buried sutures and the wound edges were re-approximated.
Melolabial Interpolation Flap Division And Inset Text: Division and inset of the melolabial interpolation flap was performed to achieve optimal aesthetic result, restore normal anatomic appearance and avoid distortion of normal anatomy, expedite and facilitate wound healing, achieve optimal functional result and because linear closure either not possible or would produce suboptimal result. The patient was prepped and draped in the usual manner. The pedicle was infiltrated with local anesthesia. The pedicle was sectioned with a #15 blade. The pedicle was de-bulked and trimmed to match the shape of the defect. Hemostasis was achieved. The flap donor site and free margin of the flap were secured with deep buried sutures and the wound edges were re-approximated.
Mastoid Interpolation Flap Division And Inset Text: Division and inset of the mastoid interpolation flap was performed to achieve optimal aesthetic result, restore normal anatomic appearance and avoid distortion of normal anatomy, expedite and facilitate wound healing, achieve optimal functional result and because linear closure either not possible or would produce suboptimal result. The patient was prepped and draped in the usual manner. The pedicle was infiltrated with local anesthesia. The pedicle was sectioned with a #15 blade. The pedicle was de-bulked and trimmed to match the shape of the defect. Hemostasis was achieved. The flap donor site and free margin of the flap were secured with deep buried sutures and the wound edges were re-approximated.
Paramedian Forehead Flap Division And Inset Text: Division and inset of the paramedian forehead flap was performed to achieve optimal aesthetic result, restore normal anatomic appearance and avoid distortion of normal anatomy, expedite and facilitate wound healing, achieve optimal functional result and because linear closure either not possible or would produce suboptimal result. The patient was prepped and draped in the usual manner. The pedicle was infiltrated with local anesthesia. The pedicle was sectioned with a #15 blade. The pedicle was de-bulked and trimmed to match the shape of the defect. Hemostasis was achieved. The flap donor site and free margin of the flap were secured with deep buried sutures and the wound edges were re-approximated.
Posterior Auricular Interpolation Flap Division And Inset Text: Division and inset of the posterior auricular interpolation flap was performed to achieve optimal aesthetic result, restore normal anatomic appearance and avoid distortion of normal anatomy, expedite and facilitate wound healing, achieve optimal functional result and because linear closure either not possible or would produce suboptimal result. The patient was prepped and draped in the usual manner. The pedicle was infiltrated with local anesthesia. The pedicle was sectioned with a #15 blade. The pedicle was de-bulked and trimmed to match the shape of the defect. Hemostasis was achieved. The flap donor site and free margin of the flap were secured with deep buried sutures and the wound edges were re-approximated.
Manual Repair Warning Statement: We plan on removing the manually selected variable below in favor of our much easier automatic structured text blocks found in the previous tab. We decided to do this to help make the flow better and give you the full power of structured data. Manual selection is never going to be ideal in our platform and I would encourage you to avoid using manual selection from this point on, especially since I will be sunsetting this feature. It is important that you do one of two things with the customized text below. First, you can save all of the text in a word file so you can have it for future reference. Second, transfer the text to the appropriate area in the Library tab. Lastly, if there is a flap or graft type which we do not have you need to let us know right away so I can add it in before the variable is hidden. No need to panic, we plan to give you roughly 6 months to make the change.
Time Patient Discharged: [id number]
Consent: Patient was brought back to the [facility]. The patient's health history was reviewed and updated. The patient is cleared for surgery. The rationale for Repairs was explained to the patient and consent was obtained.The risks, benefits, possible complications and alternatives to treatment were discussed in detail. Specifically, the risks of infection, scarring, bleeding, prolonged wound healing, incomplete removal, nerve injury and recurrence were addressed. Prior to the procedure, the treatment site was clearly identified and confirmed by the patient and/or guardian.
Post-Care Instructions: I reviewed with the patient in detail post-care instructions. Patient is not to engage in any heavy lifting, exercise, or swimming for the next 14 days. Should the patient develop any fevers, chills, bleeding, severe pain patient will contact the office immediately. Patient sent with written wound care instructions and a supply bag.
Pain Refusal Text: I offered to prescribe pain medication but the patient refused to take this medication.
Where Do You Want The Question To Include Opioid Counseling Located?: Case Summary Tab
Information: Selecting Yes will display possible errors in your note based on the variables you have selected. This validation is only offered as a suggestion for you. PLEASE NOTE THAT THE VALIDATION TEXT WILL BE REMOVED WHEN YOU FINALIZE YOUR NOTE. IF YOU WANT TO FAX A PRELIMINARY NOTE YOU WILL NEED TO TOGGLE THIS TO 'NO' IF YOU DO NOT WANT IT IN YOUR FAXED NOTE.

## 2022-08-24 ENCOUNTER — APPOINTMENT (RX ONLY)
Dept: URBAN - METROPOLITAN AREA CLINIC 41 | Facility: CLINIC | Age: 72
Setting detail: DERMATOLOGY
End: 2022-08-24

## 2022-08-24 DIAGNOSIS — Z48.817 ENCOUNTER FOR SURGICAL AFTERCARE FOLLOWING SURGERY ON THE SKIN AND SUBCUTANEOUS TISSUE: ICD-10-CM

## 2022-08-24 PROCEDURE — ? COUNSELING

## 2022-08-24 PROCEDURE — ? DRESSING CHANGE

## 2022-08-24 PROCEDURE — 99211 OFF/OP EST MAY X REQ PHY/QHP: CPT | Mod: 24

## 2022-08-24 ASSESSMENT — LOCATION SIMPLE DESCRIPTION DERM: LOCATION SIMPLE: RIGHT EAR

## 2022-08-24 ASSESSMENT — LOCATION ZONE DERM: LOCATION ZONE: EAR

## 2022-08-24 ASSESSMENT — LOCATION DETAILED DESCRIPTION DERM: LOCATION DETAILED: RIGHT TRIANGULAR FOSSA

## 2022-08-24 NOTE — PROCEDURE: DRESSING CHANGE
Detail Level: Detailed
Add 36866 Cpt? (Important Note: In 2017 The Use Of 34183 Is Being Tracked By Cms To Determine Future Global Period Reimbursement For Global Periods): no

## 2022-09-07 ENCOUNTER — APPOINTMENT (RX ONLY)
Dept: URBAN - METROPOLITAN AREA CLINIC 41 | Facility: CLINIC | Age: 72
Setting detail: DERMATOLOGY
End: 2022-09-07

## 2022-09-07 DIAGNOSIS — Z48.817 ENCOUNTER FOR SURGICAL AFTERCARE FOLLOWING SURGERY ON THE SKIN AND SUBCUTANEOUS TISSUE: ICD-10-CM

## 2022-09-07 PROCEDURE — ? DRESSING CHANGE

## 2022-09-07 PROCEDURE — 99024 POSTOP FOLLOW-UP VISIT: CPT

## 2022-09-07 ASSESSMENT — LOCATION DETAILED DESCRIPTION DERM: LOCATION DETAILED: RIGHT SUPERIOR CRUS OF ANTIHELIX

## 2022-09-07 ASSESSMENT — LOCATION SIMPLE DESCRIPTION DERM: LOCATION SIMPLE: RIGHT EAR

## 2022-09-07 ASSESSMENT — LOCATION ZONE DERM: LOCATION ZONE: EAR

## 2022-09-07 NOTE — PROCEDURE: DRESSING CHANGE
Body Location Override (Optional - Billing Will Still Be Based On Selected Body Map Location If Applicable): right ear
Detail Level: Detailed
Wound Evaluated By: CONCHA Rodgers
Add 12040 Cpt? (Important Note: In 2017 The Use Of 43955 Is Being Tracked By Cms To Determine Future Global Period Reimbursement For Global Periods): yes

## 2022-09-19 ENCOUNTER — APPOINTMENT (RX ONLY)
Dept: URBAN - METROPOLITAN AREA CLINIC 41 | Facility: CLINIC | Age: 72
Setting detail: DERMATOLOGY
End: 2022-09-19

## 2022-09-19 DIAGNOSIS — Z48.02 ENCOUNTER FOR REMOVAL OF SUTURES: ICD-10-CM

## 2022-09-19 PROCEDURE — ? SUTURE REMOVAL (GLOBAL PERIOD)

## 2022-09-19 PROCEDURE — 99024 POSTOP FOLLOW-UP VISIT: CPT

## 2022-09-19 ASSESSMENT — LOCATION SIMPLE DESCRIPTION DERM: LOCATION SIMPLE: SCALP

## 2022-09-19 ASSESSMENT — LOCATION DETAILED DESCRIPTION DERM: LOCATION DETAILED: RIGHT CENTRAL POSTAURICULAR SKIN

## 2022-09-19 ASSESSMENT — LOCATION ZONE DERM: LOCATION ZONE: SCALP

## 2022-09-19 NOTE — PROCEDURE: SUTURE REMOVAL (GLOBAL PERIOD)
Body Location Override (Optional - Billing Will Still Be Based On Selected Body Map Location If Applicable): right postauricular
Detail Level: Simple
Add 60516 Cpt? (Important Note: In 2017 The Use Of 55282 Is Being Tracked By Cms To Determine Future Global Period Reimbursement For Global Periods): yes

## 2022-11-09 ENCOUNTER — APPOINTMENT (RX ONLY)
Dept: URBAN - METROPOLITAN AREA CLINIC 41 | Facility: CLINIC | Age: 72
Setting detail: DERMATOLOGY
End: 2022-11-09

## 2022-11-09 DIAGNOSIS — L90.5 SCAR CONDITIONS AND FIBROSIS OF SKIN: ICD-10-CM

## 2022-11-09 DIAGNOSIS — Z85.828 PERSONAL HISTORY OF OTHER MALIGNANT NEOPLASM OF SKIN: ICD-10-CM

## 2022-11-09 DIAGNOSIS — L57.8 OTHER SKIN CHANGES DUE TO CHRONIC EXPOSURE TO NONIONIZING RADIATION: ICD-10-CM

## 2022-11-09 DIAGNOSIS — L57.0 ACTINIC KERATOSIS: ICD-10-CM

## 2022-11-09 DIAGNOSIS — L82.1 OTHER SEBORRHEIC KERATOSIS: ICD-10-CM

## 2022-11-09 PROCEDURE — ? LIQUID NITROGEN

## 2022-11-09 PROCEDURE — 17003 DESTRUCT PREMALG LES 2-14: CPT | Mod: 79

## 2022-11-09 PROCEDURE — 17000 DESTRUCT PREMALG LESION: CPT | Mod: 79

## 2022-11-09 PROCEDURE — 99213 OFFICE O/P EST LOW 20 MIN: CPT | Mod: 24,25

## 2022-11-09 PROCEDURE — ? COUNSELING

## 2022-11-09 ASSESSMENT — LOCATION DETAILED DESCRIPTION DERM
LOCATION DETAILED: LEFT SUPERIOR OCCIPITAL SCALP
LOCATION DETAILED: RIGHT SUPERIOR CRUS OF ANTIHELIX
LOCATION DETAILED: LEFT MEDIAL UPPER BACK

## 2022-11-09 ASSESSMENT — LOCATION ZONE DERM
LOCATION ZONE: EAR
LOCATION ZONE: SCALP
LOCATION ZONE: TRUNK

## 2022-11-09 ASSESSMENT — LOCATION SIMPLE DESCRIPTION DERM
LOCATION SIMPLE: LEFT UPPER BACK
LOCATION SIMPLE: LEFT OCCIPITAL SCALP
LOCATION SIMPLE: RIGHT EAR

## 2022-11-09 NOTE — PROCEDURE: LIQUID NITROGEN
Show Aperture Variable?: Yes
Application Tool (Optional): Liquid Nitrogen Sprayer
Number Of Freeze-Thaw Cycles: 1 freeze-thaw cycle
Render Note In Bullet Format When Appropriate: No
Post-Care Instructions: I reviewed with the patient in detail post-care instructions. Patient is to wear sunprotection, and avoid picking at any of the treated lesions. Pt may apply Vaseline to crusted or scabbing areas.
Duration Of Freeze Thaw-Cycle (Seconds): 7
Consent: The patient's consent was obtained including but not limited to risks of crusting, scabbing, blistering, scarring, darker or lighter pigmentary change, recurrence, incomplete removal and infection.
Detail Level: Detailed

## 2023-05-24 ENCOUNTER — APPOINTMENT (RX ONLY)
Dept: URBAN - METROPOLITAN AREA CLINIC 41 | Facility: CLINIC | Age: 73
Setting detail: DERMATOLOGY
End: 2023-05-24

## 2023-05-24 DIAGNOSIS — L57.8 OTHER SKIN CHANGES DUE TO CHRONIC EXPOSURE TO NONIONIZING RADIATION: ICD-10-CM

## 2023-05-24 DIAGNOSIS — D485 NEOPLASM OF UNCERTAIN BEHAVIOR OF SKIN: ICD-10-CM

## 2023-05-24 DIAGNOSIS — Z85.820 PERSONAL HISTORY OF MALIGNANT MELANOMA OF SKIN: ICD-10-CM

## 2023-05-24 DIAGNOSIS — L90.5 SCAR CONDITIONS AND FIBROSIS OF SKIN: ICD-10-CM

## 2023-05-24 PROBLEM — D48.5 NEOPLASM OF UNCERTAIN BEHAVIOR OF SKIN: Status: ACTIVE | Noted: 2023-05-24

## 2023-05-24 PROCEDURE — 11102 TANGNTL BX SKIN SINGLE LES: CPT

## 2023-05-24 PROCEDURE — ? BIOPSY BY SHAVE METHOD

## 2023-05-24 PROCEDURE — ? SUNSCREEN RECOMMENDATIONS

## 2023-05-24 PROCEDURE — ? COUNSELING

## 2023-05-24 PROCEDURE — 99213 OFFICE O/P EST LOW 20 MIN: CPT | Mod: 25

## 2023-05-24 ASSESSMENT — LOCATION DETAILED DESCRIPTION DERM
LOCATION DETAILED: RIGHT SUPERIOR LATERAL LOWER BACK
LOCATION DETAILED: RIGHT VENTRAL DISTAL FOREARM
LOCATION DETAILED: RIGHT DISTAL DORSAL FOREARM

## 2023-05-24 ASSESSMENT — LOCATION ZONE DERM
LOCATION ZONE: TRUNK
LOCATION ZONE: ARM

## 2023-05-24 ASSESSMENT — LOCATION SIMPLE DESCRIPTION DERM
LOCATION SIMPLE: RIGHT LOWER BACK
LOCATION SIMPLE: RIGHT FOREARM

## 2023-05-24 NOTE — PROCEDURE: BIOPSY BY SHAVE METHOD
Body Location Override (Optional - Billing Will Still Be Based On Selected Body Map Location If Applicable): right ventral wrist
Detail Level: Detailed
Depth Of Biopsy: dermis
Was A Bandage Applied: Yes
Size Of Lesion In Cm: 0
Biopsy Type: H and E
Biopsy Method: Dermablade
Anesthesia Type: 2% Xylocaine with 1:100,000 epinephrine
Anesthesia Volume In Cc: 1.5
Hemostasis: Drysol
Wound Care: Vaseline
Dressing: Band-Aid
Destruction After The Procedure: No
Type Of Destruction Used: Curettage
Cryotherapy Text: The wound bed was treated with cryotherapy after the biopsy was performed.
Electrodesiccation Text: The wound bed was treated with electrodesiccation after the biopsy was performed.
Electrodesiccation And Curettage Text: The wound bed was treated with electrodesiccation and curettage after the biopsy was performed.
Silver Nitrate Text: The wound bed was treated with silver nitrate after the biopsy was performed.
Lab: [id number]
Lab Facility: [id number]
Path Notes (To The Dermatopathologist): Mohs if positive
Consent: Verbal consent was obtained and risks were reviewed including but not limited to scarring, infection, bleeding, scabbing, incomplete removal, nerve damage and allergy to anesthesia.
Notification Instructions: Patient will be notified of biopsy results. However, patient instructed to call the office if not contacted within 2 weeks.
Billing Type: Third-Party Bill
Information: Selecting Yes will display possible errors in your note based on the variables you have selected. This validation is only offered as a suggestion for you. PLEASE NOTE THAT THE VALIDATION TEXT WILL BE REMOVED WHEN YOU FINALIZE YOUR NOTE. IF YOU WANT TO FAX A PRELIMINARY NOTE YOU WILL NEED TO TOGGLE THIS TO 'NO' IF YOU DO NOT WANT IT IN YOUR FAXED NOTE.

## 2024-02-22 ENCOUNTER — TRANSFERRED RECORDS (OUTPATIENT)
Dept: HEALTH INFORMATION MANAGEMENT | Facility: CLINIC | Age: 74
End: 2024-02-22

## 2024-02-22 LAB — EJECTION FRACTION: NORMAL %

## 2024-03-18 ENCOUNTER — TRANSFERRED RECORDS (OUTPATIENT)
Dept: HEALTH INFORMATION MANAGEMENT | Facility: CLINIC | Age: 74
End: 2024-03-18

## 2024-04-11 ENCOUNTER — TRANSFERRED RECORDS (OUTPATIENT)
Dept: HEALTH INFORMATION MANAGEMENT | Facility: CLINIC | Age: 74
End: 2024-04-11

## 2024-05-13 ENCOUNTER — TRANSFERRED RECORDS (OUTPATIENT)
Dept: HEALTH INFORMATION MANAGEMENT | Facility: CLINIC | Age: 74
End: 2024-05-13

## 2024-05-13 LAB
CREATININE (EXTERNAL): 1.23 MG/DL (ref 0.7–1.3)
GFR ESTIMATED (EXTERNAL): 62 ML/MIN/1.73M2
GLUCOSE (EXTERNAL): 179 MG/DL (ref 70–110)
POTASSIUM (EXTERNAL): 3.9 MMOL/L (ref 3.5–5.1)

## 2024-05-22 ENCOUNTER — TRANSFERRED RECORDS (OUTPATIENT)
Dept: HEALTH INFORMATION MANAGEMENT | Facility: CLINIC | Age: 74
End: 2024-05-22
Payer: MEDICARE

## 2024-05-31 ENCOUNTER — APPOINTMENT (RX ONLY)
Dept: URBAN - METROPOLITAN AREA CLINIC 41 | Facility: CLINIC | Age: 74
Setting detail: DERMATOLOGY
End: 2024-05-31

## 2024-05-31 DIAGNOSIS — D22 MELANOCYTIC NEVI: ICD-10-CM

## 2024-05-31 DIAGNOSIS — L82.1 OTHER SEBORRHEIC KERATOSIS: ICD-10-CM

## 2024-05-31 DIAGNOSIS — L11.1 TRANSIENT ACANTHOLYTIC DERMATOSIS [GROVER]: ICD-10-CM | Status: INADEQUATELY CONTROLLED

## 2024-05-31 DIAGNOSIS — Z85.828 PERSONAL HISTORY OF OTHER MALIGNANT NEOPLASM OF SKIN: ICD-10-CM

## 2024-05-31 DIAGNOSIS — Z85.820 PERSONAL HISTORY OF MALIGNANT MELANOMA OF SKIN: ICD-10-CM

## 2024-05-31 PROBLEM — D22.5 MELANOCYTIC NEVI OF TRUNK: Status: ACTIVE | Noted: 2024-05-31

## 2024-05-31 PROBLEM — D23.39 OTHER BENIGN NEOPLASM OF SKIN OF OTHER PARTS OF FACE: Status: ACTIVE | Noted: 2024-05-31

## 2024-05-31 PROCEDURE — ? TREATMENT REGIMEN

## 2024-05-31 PROCEDURE — ? COUNSELING

## 2024-05-31 PROCEDURE — ? PRESCRIPTION

## 2024-05-31 PROCEDURE — 99214 OFFICE O/P EST MOD 30 MIN: CPT

## 2024-05-31 RX ORDER — TRIAMCINOLONE ACETONIDE 1 MG/G
1 CREAM TOPICAL BID
Qty: 454 | Refills: 2 | Status: ERX | COMMUNITY
Start: 2024-05-31

## 2024-05-31 RX ADMIN — TRIAMCINOLONE ACETONIDE 1: 1 CREAM TOPICAL at 00:00

## 2024-05-31 ASSESSMENT — LOCATION ZONE DERM
LOCATION ZONE: EAR
LOCATION ZONE: FACE
LOCATION ZONE: TRUNK

## 2024-05-31 ASSESSMENT — LOCATION SIMPLE DESCRIPTION DERM
LOCATION SIMPLE: LEFT UPPER BACK
LOCATION SIMPLE: ABDOMEN
LOCATION SIMPLE: RIGHT EAR
LOCATION SIMPLE: RIGHT UPPER BACK
LOCATION SIMPLE: RIGHT FOREHEAD
LOCATION SIMPLE: RIGHT CHEEK

## 2024-05-31 ASSESSMENT — LOCATION DETAILED DESCRIPTION DERM
LOCATION DETAILED: PERIUMBILICAL SKIN
LOCATION DETAILED: RIGHT SUPERIOR CRUS OF ANTIHELIX
LOCATION DETAILED: RIGHT INFERIOR UPPER BACK
LOCATION DETAILED: RIGHT INFERIOR FOREHEAD
LOCATION DETAILED: RIGHT INFERIOR CENTRAL MALAR CHEEK
LOCATION DETAILED: RIGHT LATERAL ABDOMEN
LOCATION DETAILED: LEFT SUPERIOR LATERAL UPPER BACK

## 2024-05-31 NOTE — PROCEDURE: TREATMENT REGIMEN
Initiate Treatment: Triamcinolone cream BID-TID until clear after moisturizing
Otc Regimen: CeraVe moisturizing cream QD
Detail Level: Simple

## 2024-05-31 NOTE — PROCEDURE: COUNSELING
When Should The Patient Follow-Up For Their Next Full-Body Skin Exam?: 1 Year
Quality 137: Melanoma: Continuity Of Care - Recall System: Patient information entered into a recall system that includes: target date for the next exam specified AND a process to follow up with patients regarding missed or unscheduled appointments
Sunscreen Recommendations: Regular photo protection with zinc based sunscreen and photo protective clothing.
Detail Level: Detailed
Sunscreen Recommendations: Discussed importance of regular photo protection with zinc based sunscreen and photo protective clothing.
Detail Level: Generalized
Sunscreen Recommendations: Mineral based sunscreens containing zinc and titanium to face, neck, and chest
Detail Level: Zone
Moisturizer Recommendations: Discussed importance of moisturizing with a thick cream like CeraVe Cream multiple times a day especially after showers
Laser Recommendations: Discussed that lasers may be beneficial. Explained that treatment is considered cosmetic and not covered by insurance
Sunscreen Recommendations: Regular use of mineral based sunscreen and photo protective clothing
Ipl Recommendations: Discussed that IPL may be beneficial. Explained that treatment is considered cosmetic and not covered by insurance
Topical Retinoids Recommendations: Retinoids are recommended to help with cell turnover

## 2024-06-05 ENCOUNTER — TRANSFERRED RECORDS (OUTPATIENT)
Dept: HEALTH INFORMATION MANAGEMENT | Facility: CLINIC | Age: 74
End: 2024-06-05
Payer: MEDICARE

## 2024-06-13 ENCOUNTER — TRANSCRIBE ORDERS (OUTPATIENT)
Dept: OTHER | Age: 74
End: 2024-06-13

## 2024-06-13 DIAGNOSIS — I25.10 CAD (CORONARY ARTERY DISEASE): Primary | ICD-10-CM

## 2024-06-20 ENCOUNTER — TRANSFERRED RECORDS (OUTPATIENT)
Dept: HEALTH INFORMATION MANAGEMENT | Facility: CLINIC | Age: 74
End: 2024-06-20
Payer: MEDICARE

## 2024-06-20 LAB — EJECTION FRACTION: NORMAL %

## 2024-06-21 ENCOUNTER — TELEPHONE (OUTPATIENT)
Dept: CARDIOLOGY | Facility: CLINIC | Age: 74
End: 2024-06-21
Payer: MEDICARE

## 2024-06-21 DIAGNOSIS — Z01.810 ENCOUNTER FOR PREPROCEDURAL CARDIOVASCULAR EXAMINATION: ICD-10-CM

## 2024-06-21 DIAGNOSIS — R09.89 OTHER SPECIFIED SYMPTOMS AND SIGNS INVOLVING THE CIRCULATORY AND RESPIRATORY SYSTEMS: ICD-10-CM

## 2024-06-21 DIAGNOSIS — I99.8 OTHER DISORDER OF CIRCULATORY SYSTEM: ICD-10-CM

## 2024-06-21 DIAGNOSIS — R79.9 ABNORMAL FINDING OF BLOOD CHEMISTRY, UNSPECIFIED: ICD-10-CM

## 2024-06-21 DIAGNOSIS — I25.10 CORONARY ARTERY DISEASE INVOLVING NATIVE CORONARY ARTERY OF NATIVE HEART, UNSPECIFIED WHETHER ANGINA PRESENT: Primary | ICD-10-CM

## 2024-06-21 NOTE — TELEPHONE ENCOUNTER
Patient called regarding a referral for Dr. Wilson from Dr. Parikh at [facility] for a CABG. Patient was scheduled for clinic visit on 7/1/24. Patient states he will check with his cardiologist if PCI is an option now that his bleeding has stopped and hemoglobin improved, will call [place] RNCC next week to confirm clinic visit.   WDL

## 2024-06-21 NOTE — TELEPHONE ENCOUNTER
Spoke with Brigid from [facility] and she is sending over images to [facility] for   Angiogram 5/22   TTE 5/22   Per [facility] staff message

## 2024-06-25 NOTE — TELEPHONE ENCOUNTER
FUTURE VISIT INFORMATION      SURGERY INFORMATION:  Coronoary Artery Bygass Graft.  Dr. Deniz Wilson.     RECORDS REQUESTED FROM:       Most recent EKG+ Tracin24    Most recent ECHO: 24

## 2024-06-30 LAB
ABO/RH(D): NORMAL
ANTIBODY SCREEN: NEGATIVE
SPECIMEN EXPIRATION DATE: NORMAL

## 2024-07-01 ENCOUNTER — OFFICE VISIT (OUTPATIENT)
Dept: SURGERY | Facility: CLINIC | Age: 74
End: 2024-07-01
Payer: MEDICARE

## 2024-07-01 ENCOUNTER — OFFICE VISIT (OUTPATIENT)
Dept: CARDIOLOGY | Facility: CLINIC | Age: 74
End: 2024-07-01
Attending: SURGERY
Payer: MEDICARE

## 2024-07-01 ENCOUNTER — ANCILLARY PROCEDURE (OUTPATIENT)
Dept: ULTRASOUND IMAGING | Facility: CLINIC | Age: 74
End: 2024-07-01
Attending: SURGERY
Payer: MEDICARE

## 2024-07-01 ENCOUNTER — LAB (OUTPATIENT)
Dept: LAB | Facility: CLINIC | Age: 74
End: 2024-07-01
Payer: MEDICARE

## 2024-07-01 ENCOUNTER — ANCILLARY PROCEDURE (OUTPATIENT)
Dept: CT IMAGING | Facility: CLINIC | Age: 74
End: 2024-07-01
Attending: SURGERY
Payer: MEDICARE

## 2024-07-01 ENCOUNTER — PRE VISIT (OUTPATIENT)
Dept: SURGERY | Facility: CLINIC | Age: 74
End: 2024-07-01

## 2024-07-01 ENCOUNTER — ANESTHESIA EVENT (OUTPATIENT)
Dept: SURGERY | Facility: CLINIC | Age: 74
End: 2024-07-01

## 2024-07-01 VITALS
TEMPERATURE: 97.9 F | HEIGHT: 68 IN | RESPIRATION RATE: 16 BRPM | WEIGHT: 315 LBS | BODY MASS INDEX: 47.74 KG/M2 | DIASTOLIC BLOOD PRESSURE: 68 MMHG | SYSTOLIC BLOOD PRESSURE: 106 MMHG | OXYGEN SATURATION: 99 % | HEART RATE: 67 BPM

## 2024-07-01 VITALS
HEART RATE: 63 BPM | HEIGHT: 69 IN | WEIGHT: 315 LBS | DIASTOLIC BLOOD PRESSURE: 76 MMHG | BODY MASS INDEX: 46.65 KG/M2 | OXYGEN SATURATION: 100 % | SYSTOLIC BLOOD PRESSURE: 137 MMHG

## 2024-07-01 DIAGNOSIS — Z01.818 PREOP EXAMINATION: Primary | ICD-10-CM

## 2024-07-01 DIAGNOSIS — I25.10 CORONARY ARTERY DISEASE INVOLVING NATIVE CORONARY ARTERY OF NATIVE HEART, UNSPECIFIED WHETHER ANGINA PRESENT: ICD-10-CM

## 2024-07-01 DIAGNOSIS — R79.9 ABNORMAL FINDING OF BLOOD CHEMISTRY, UNSPECIFIED: ICD-10-CM

## 2024-07-01 DIAGNOSIS — K62.5 RECTAL BLEEDING: Primary | ICD-10-CM

## 2024-07-01 DIAGNOSIS — I99.8 OTHER DISORDER OF CIRCULATORY SYSTEM: ICD-10-CM

## 2024-07-01 DIAGNOSIS — Z01.810 ENCOUNTER FOR PREPROCEDURAL CARDIOVASCULAR EXAMINATION: ICD-10-CM

## 2024-07-01 DIAGNOSIS — R09.89 OTHER SPECIFIED SYMPTOMS AND SIGNS INVOLVING THE CIRCULATORY AND RESPIRATORY SYSTEMS: ICD-10-CM

## 2024-07-01 LAB
ALBUMIN SERPL BCG-MCNC: 3.9 G/DL (ref 3.5–5.2)
ALBUMIN UR-MCNC: NEGATIVE MG/DL
ALP SERPL-CCNC: 110 U/L (ref 40–150)
ALT SERPL W P-5'-P-CCNC: 7 U/L (ref 0–70)
ANION GAP SERPL CALCULATED.3IONS-SCNC: 14 MMOL/L (ref 7–15)
APPEARANCE UR: CLEAR
APTT PPP: 27 SECONDS (ref 22–38)
AST SERPL W P-5'-P-CCNC: 28 U/L (ref 0–45)
BILIRUB SERPL-MCNC: 0.3 MG/DL
BILIRUB UR QL STRIP: NEGATIVE
BUN SERPL-MCNC: 32.9 MG/DL (ref 8–23)
CALCIUM SERPL-MCNC: 9.4 MG/DL (ref 8.8–10.2)
CHLORIDE SERPL-SCNC: 101 MMOL/L (ref 98–107)
COLOR UR AUTO: ABNORMAL
CREAT SERPL-MCNC: 1.37 MG/DL (ref 0.67–1.17)
DEPRECATED HCO3 PLAS-SCNC: 21 MMOL/L (ref 22–29)
EGFRCR SERPLBLD CKD-EPI 2021: 54 ML/MIN/1.73M2
ERYTHROCYTE [DISTWIDTH] IN BLOOD BY AUTOMATED COUNT: 13.9 % (ref 10–15)
GLUCOSE SERPL-MCNC: 191 MG/DL (ref 70–99)
GLUCOSE UR STRIP-MCNC: 500 MG/DL
HBA1C MFR BLD: 6.7 %
HCT VFR BLD AUTO: 29.2 % (ref 40–53)
HGB BLD-MCNC: 9.5 G/DL (ref 13.3–17.7)
HGB UR QL STRIP: NEGATIVE
HOLD SPECIMEN: NORMAL
HYALINE CASTS: 18 /LPF
INR PPP: 1.18 (ref 0.85–1.15)
KETONES UR STRIP-MCNC: NEGATIVE MG/DL
LEUKOCYTE ESTERASE UR QL STRIP: NEGATIVE
MCH RBC QN AUTO: 29.3 PG (ref 26.5–33)
MCHC RBC AUTO-ENTMCNC: 32.5 G/DL (ref 31.5–36.5)
MCV RBC AUTO: 90 FL (ref 78–100)
NITRATE UR QL: NEGATIVE
PH UR STRIP: 5 [PH] (ref 5–7)
PLATELET # BLD AUTO: 183 10E3/UL (ref 150–450)
POTASSIUM SERPL-SCNC: 4.3 MMOL/L (ref 3.4–5.3)
PREALB SERPL-MCNC: 22.3 MG/DL (ref 20–40)
PROT SERPL-MCNC: 6.2 G/DL (ref 6.4–8.3)
RBC # BLD AUTO: 3.24 10E6/UL (ref 4.4–5.9)
RBC URINE: <1 /HPF
SODIUM SERPL-SCNC: 136 MMOL/L (ref 135–145)
SP GR UR STRIP: 1.01 (ref 1–1.03)
SQUAMOUS EPITHELIAL: <1 /HPF
UROBILINOGEN UR STRIP-MCNC: NORMAL MG/DL
WBC # BLD AUTO: 6.4 10E3/UL (ref 4–11)
WBC URINE: 1 /HPF

## 2024-07-01 PROCEDURE — 83036 HEMOGLOBIN GLYCOSYLATED A1C: CPT | Performed by: SURGERY

## 2024-07-01 PROCEDURE — 99204 OFFICE O/P NEW MOD 45 MIN: CPT | Performed by: PHYSICIAN ASSISTANT

## 2024-07-01 PROCEDURE — 93880 EXTRACRANIAL BILAT STUDY: CPT | Performed by: RADIOLOGY

## 2024-07-01 PROCEDURE — 85730 THROMBOPLASTIN TIME PARTIAL: CPT | Performed by: PATHOLOGY

## 2024-07-01 PROCEDURE — 99204 OFFICE O/P NEW MOD 45 MIN: CPT | Performed by: SURGERY

## 2024-07-01 PROCEDURE — 86900 BLOOD TYPING SEROLOGIC ABO: CPT | Performed by: SURGERY

## 2024-07-01 PROCEDURE — G0463 HOSPITAL OUTPT CLINIC VISIT: HCPCS | Performed by: SURGERY

## 2024-07-01 PROCEDURE — 81001 URINALYSIS AUTO W/SCOPE: CPT | Performed by: PATHOLOGY

## 2024-07-01 PROCEDURE — 99000 SPECIMEN HANDLING OFFICE-LAB: CPT | Performed by: PATHOLOGY

## 2024-07-01 PROCEDURE — G1010 CDSM STANSON: HCPCS | Performed by: RADIOLOGY

## 2024-07-01 PROCEDURE — 36415 COLL VENOUS BLD VENIPUNCTURE: CPT | Performed by: PATHOLOGY

## 2024-07-01 PROCEDURE — 93970 EXTREMITY STUDY: CPT | Performed by: RADIOLOGY

## 2024-07-01 PROCEDURE — 85027 COMPLETE CBC AUTOMATED: CPT | Performed by: PATHOLOGY

## 2024-07-01 PROCEDURE — 93000 ELECTROCARDIOGRAM COMPLETE: CPT | Performed by: INTERNAL MEDICINE

## 2024-07-01 PROCEDURE — 80053 COMPREHEN METABOLIC PANEL: CPT | Performed by: PATHOLOGY

## 2024-07-01 PROCEDURE — 71250 CT THORAX DX C-: CPT | Mod: MG | Performed by: RADIOLOGY

## 2024-07-01 PROCEDURE — 84134 ASSAY OF PREALBUMIN: CPT | Performed by: SURGERY

## 2024-07-01 PROCEDURE — 85610 PROTHROMBIN TIME: CPT | Performed by: PATHOLOGY

## 2024-07-01 RX ORDER — TRIAMCINOLONE ACETONIDE 1 MG/G
1 CREAM TOPICAL PRN
COMMUNITY

## 2024-07-01 RX ORDER — OXYBUTYNIN CHLORIDE 10 MG/1
10 TABLET, EXTENDED RELEASE ORAL EVERY MORNING
COMMUNITY
Start: 2023-03-27

## 2024-07-01 RX ORDER — ALLOPURINOL 100 MG/1
100 TABLET ORAL 2 TIMES DAILY
COMMUNITY

## 2024-07-01 RX ORDER — TORSEMIDE 20 MG/1
20 TABLET ORAL EVERY MORNING
COMMUNITY
Start: 2023-09-29

## 2024-07-01 RX ORDER — PSYLLIUM HUSK/CALCIUM CARB 1 G-60 MG
CAPSULE ORAL EVERY MORNING
COMMUNITY
End: 2024-07-09 | Stop reason: ALTCHOICE

## 2024-07-01 RX ORDER — INSULIN GLARGINE 100 [IU]/ML
38 INJECTION, SOLUTION SUBCUTANEOUS EVERY MORNING
Status: ON HOLD | COMMUNITY
Start: 2023-05-16 | End: 2024-08-10

## 2024-07-01 RX ORDER — DILTIAZEM HYDROCHLORIDE 30 MG/1
30 TABLET, FILM COATED ORAL PRN
Status: ON HOLD | COMMUNITY
End: 2024-08-10

## 2024-07-01 RX ORDER — SOTALOL HYDROCHLORIDE 80 MG/1
80 TABLET ORAL EVERY MORNING
Status: ON HOLD | COMMUNITY
End: 2024-08-10

## 2024-07-01 RX ORDER — ASPIRIN 81 MG/1
81 TABLET ORAL EVERY MORNING
COMMUNITY
Start: 2024-05-22

## 2024-07-01 RX ORDER — EXENATIDE 2 MG/.85ML
2 INJECTION, SUSPENSION, EXTENDED RELEASE SUBCUTANEOUS
COMMUNITY

## 2024-07-01 RX ORDER — TAMSULOSIN HYDROCHLORIDE 0.4 MG/1
0.4 CAPSULE ORAL AT BEDTIME
COMMUNITY

## 2024-07-01 RX ORDER — SWAB
1 SWAB, NON-MEDICATED MISCELLANEOUS EVERY MORNING
COMMUNITY

## 2024-07-01 RX ORDER — ROSUVASTATIN CALCIUM 20 MG/1
20 TABLET, COATED ORAL AT BEDTIME
COMMUNITY
End: 2024-07-23

## 2024-07-01 RX ORDER — CLOPIDOGREL BISULFATE 75 MG/1
75 TABLET ORAL EVERY MORNING
Status: ON HOLD | COMMUNITY
Start: 2024-05-22 | End: 2024-08-10

## 2024-07-01 RX ORDER — SPIRONOLACTONE 25 MG/1
25 TABLET ORAL EVERY MORNING
Status: ON HOLD | COMMUNITY
End: 2024-08-10

## 2024-07-01 RX ORDER — DILTIAZEM HYDROCHLORIDE 120 MG/1
120 CAPSULE, COATED, EXTENDED RELEASE ORAL EVERY MORNING
Status: ON HOLD | COMMUNITY
Start: 2023-06-14 | End: 2024-08-10

## 2024-07-01 ASSESSMENT — ENCOUNTER SYMPTOMS: SEIZURES: 0

## 2024-07-01 ASSESSMENT — LIFESTYLE VARIABLES: TOBACCO_USE: 0

## 2024-07-01 ASSESSMENT — PAIN SCALES - GENERAL
PAINLEVEL: NO PAIN (0)
PAINLEVEL: NO PAIN (0)

## 2024-07-01 NOTE — H&P
Pre-Operative H & P     CC:  Preoperative exam to assess for increased cardiopulmonary risk while undergoing surgery and anesthesia.    Date of Encounter: 7/1/2024  Primary Care Physician:  Steve Zamora     Reason for visit:   Encounter Diagnoses   Name Primary?    Coronary artery disease involving native coronary artery of native heart, unspecified whether angina present Yes    Preop examination        HPI  Jorje Hutchinson is a 74 year old male who presents for pre-operative H & P in preparation for  Procedure Information       Date/Time: TBD     Procedure: CABG    Anesthesia type: General    Pre-op diagnosis: CAD    Location: [facility]    Providers: Deniz Wilson MD            Patient is being evaluated for comorbid conditions of HTN, HLD, SVT, HERRERA, CKD, DM2, morbid obesity, OA.    Mr. Hutchinson has a history of CAD. He is s/p coronary angioplasty w/ stent placement of the circumflex coronary artery in 2002. He endorses significant GALLARDO. A cath on 5/22/24 demonstrated 70% occulsion of the proximal LAD, 60% distal LAD, 30% proximal LCx, 30% distal LCx, 90  % pRCA. He now presents for the above procedure.      History is obtained from the patient and chart review. He is accompanied by his wife.    Hx of abnormal bleeding or anti-platelet use: On Plavix w/ continued bleeding of prostate 2/2 prior radiation therapy/proctitis.      Past Medical History  Past Medical History:   Diagnosis Date    CAD (coronary artery disease)     HLD (hyperlipidemia)     HTN (hypertension)     Morbid obesity (H)     HERRERA (obstructive sleep apnea)     Osteoarthritis     Proctitis     radiation induced    Prostate cancer (H)     SVT (supraventricular tachycardia) (H24)        Past Surgical History  Past Surgical History:   Procedure Laterality Date    AS TOTAL KNEE ARTHROPLASTY Right     CARDIAC CATHERIZATION  2002    COLONOSCOPY  2006    CORONARY ANGIOPLASTY  2002    w/ stent    CV  CORONARY ANGIOGRAM  05/22/2024    EXC SKIN BENIG >4CM REMAINDR BODY Right 1989    forearm & neck       Prior to Admission Medications  Current Outpatient Medications   Medication Sig Dispense Refill    allopurinol (ZYLOPRIM) 100 MG tablet Take 100 mg by mouth 2 times daily      aspirin 81 MG EC tablet Take 81 mg by mouth every morning      clopidogrel (PLAVIX) 75 MG tablet Take 75 mg by mouth every morning      diltiazem (CARDIZEM) 30 MG tablet Take 30 mg by mouth as needed      diltiazem ER COATED BEADS (CARDIZEM CD/CARTIA XT) 120 MG 24 hr capsule Take 120 mg by mouth every morning      empagliflozin (JARDIANCE) 10 MG TABS tablet Take 10 mg by mouth every morning      exenatide ER (BYDUREON BCISE) 2 MG/0.85ML auto-injector Inject 2 mg Subcutaneous every 7 days Every Sunday      insulin glargine (LANTUS SOLOSTAR) 100 UNIT/ML pen Inject 38 Units Subcutaneous every morning      magnesium chloride 535 (64 Mg) MG TBEC CR tablet Take 535 mg by mouth 2 times daily      oxyBUTYnin ER (DITROPAN XL) 10 MG 24 hr tablet Take 10 mg by mouth every morning      Psyllium-Calcium (METAMUCIL PLUS CALCIUM) CAPS Take by mouth every morning      rosuvastatin (CRESTOR) 20 MG tablet Take 20 mg by mouth at bedtime      sotalol (BETAPACE) 80 MG tablet Take 80 mg by mouth every morning      spironolactone (ALDACTONE) 25 MG tablet Take 25 mg by mouth at bedtime      tamsulosin (FLOMAX) 0.4 MG capsule Take 0.4 mg by mouth at bedtime      torsemide (DEMADEX) 20 MG tablet Take 20 mg by mouth every morning      triamcinolone (KENALOG) 0.1 % external cream Apply 1 Application topically as needed      vitamin D3 (CHOLECALCIFEROL) 10 MCG (400 UNIT) capsule Take 1 capsule by mouth every morning         Allergies  Allergies   Allergen Reactions    Shellfish-Derived Products Anaphylaxis, Difficulty breathing and Photosensitivity    Shrimp      Other Reaction(s): Swelling of eye, Swelling of throat    Latex Blisters, Dermatitis, Hives, Itching and Rash        Social History  Social History     Socioeconomic History    Marital status:      Spouse name: Not on file    Number of children: Not on file    Years of education: Not on file    Highest education level: Not on file   Occupational History    Not on file   Tobacco Use    Smoking status: Never    Smokeless tobacco: Never    Tobacco comments:     Occasional cigar 20+ years ago   Substance and Sexual Activity    Alcohol use: Yes     Comment: 1 per month    Drug use: Not Currently    Sexual activity: Not on file   Other Topics Concern    Not on file   Social History Narrative    Not on file     Social Determinants of Health     Financial Resource Strain: Low Risk  (7/8/2023)    Received from [facility]    Overall Financial Resource Strain (CARDIA)     Difficulty of Paying Living Expenses: Not hard at all   Food Insecurity: No Food Insecurity (7/8/2023)    Received from [facility]    Hunger Vital Sign     Worried About Running Out of Food in the Last Year: Never true     Ran Out of Food in the Last Year: Never true   Transportation Needs: No Transportation Needs (7/8/2023)    Received from [facility]    PRAPARE - Transportation     Lack of Transportation (Medical): No     Lack of Transportation (Non-Medical): No   Physical Activity: Inactive (7/8/2023)    Received from [facility]    Exercise Vital Sign     Days of Exercise per Week: 0 days     Minutes of Exercise per Session: 0 min   Stress: Not on file   Social Connections: Not on file   Interpersonal Safety: Unknown (7/8/2023)    Received from [facility]    Humiliation, Afraid, Rape, and Kick questionnaire     Fear of Current or Ex-Partner: No     Emotionally Abused: Not on file     Physically Abused: No     Sexually Abused: No   Housing Stability: Low Risk  (7/8/2023)    Received from [facility]    Housing Stability     What is your living situation today?: I have a steady place to live       Family History  Family History   Problem Relation Age of  "Onset    Anesthesia Reaction No family hx of     Deep Vein Thrombosis (DVT) No family hx of        Review of Systems  The complete review of systems is negative other than noted in the HPI or here.     Anesthesia Evaluation   Pt has had prior anesthetic.     No history of anesthetic complications       ROS/MED HX  ENT/Pulmonary:     (+) sleep apnea, uses CPAP,                                   (-) tobacco use, asthma and recent URI   Neurologic:  - neg neurologic ROS  (-) no seizures and no CVA   Cardiovascular:     (+)  hypertension- -  CAD -  - -                                      METS/Exercise Tolerance: 1 - Eating, dressing Comment: Unable to walk one block due to GALLARDO   Hematologic:  - neg hematologic  ROS  (-) history of blood clots and history of blood transfusion   Musculoskeletal: Comment: S/p right TKA  Chronic left knee pain        GI/Hepatic:  - neg GI/hepatic ROS  (-) GERD and liver disease   Renal/Genitourinary: Comment: Creatinine  1.42 on 6/3/24 in records from [place]    (+) renal disease,             Endo:     (+)  type II DM,                    Psychiatric/Substance Use:  - neg psychiatric ROS     Infectious Disease:  - neg infectious disease ROS     Malignancy:   (+) Malignancy (July 2023), History of Prostate.Prostate CA status post Radiation.      Other:  - neg other ROS          /68 (BP Location: Right arm, Patient Position: Sitting, Cuff Size: Adult Large)   Pulse 67   Temp 97.9  F (36.6  C) (Oral)   Resp 16   Ht 1.727 m (5' 8\")   Wt (!) 150.9 kg (332 lb 9.6 oz)   SpO2 99%   BMI 50.57 kg/m      Physical Exam  Constitutional: Awake, alert, cooperative, no apparent distress, and appears stated age.  Eyes: Pupils equal, round and reactive to light, extra ocular muscles intact, sclera clear, conjunctiva normal.  HENT: Normocephalic, oral pharynx with moist mucus membranes, good dentition. No goiter appreciated. No removable dental hardware.  Respiratory: Clear to auscultation " bilaterally, no crackles or wheezing. No SOB when supine.  Cardiovascular: Distant heart sounds, Regular rate and rhythm, normal S1 and S2, and no murmur noted.  Carotids +1, no bruits. No edema. Palpable pulses to radial & DP arteries. 1+ edema.  GI: Normal bowel sounds, soft, obese, non-tender, no masses palpated.  Exam limited due to body habitus.  Lymph/Hematologic: No cervical lymphadenopathy and no supraclavicular lymphadenopathy.  Genitourinary:  deferred  Skin: Warm and dry.  No rashes.   Musculoskeletal: Mildly limited extension ROM of neck. There is no redness, warmth, or swelling of the joints. Gross motor strength is normal.    Neurologic: Awake, alert, oriented to name, place and time. Cranial nerves II-XII are grossly intact. Gait is normal. Ambulates from chair to exam table, seats self, lies supine and sits back up w/o assistance.  Neuropsychiatric: Calm, cooperative. Normal affect. Pleasant. Answers questions appropriately, follows commands w/o difficulty.        PRIOR LABS/DIAGNOSTIC STUDIES:    All labs and imaging personally reviewed        Heart cath 5/22/24        Isamar stress test 2/22/24      Echo  2/22/24      LABS 6/3/24          The patient's records and results personally reviewed by this provider.     Outside records reviewed from:  [facility]    LAB/DIAGNOSTIC STUDIES TODAY:  CMP, CBC, A1c, T&S, prealb, INR, PTT, UA, US carotid, EKG    Assessment    Jorje Hutchinson is a 74 year old male seen as a PAC referral for risk assessment and optimization for anesthesia.    Plan/Recommendations  Pt will be optimized for the proposed procedure.  See below for details on the assessment, risk, and preoperative recommendations    NEUROLOGY  - No history of TIA, CVA or seizure    -Post Op delirium risk factors:  No risk identified    ENT  - Physical exam is concerning for complicated airway.  Thick neck, somewhat diminished neck extension ROM  Mallampati: III  TM: >  "3    CARDIAC  - Multi vessel CAD, s/p coronary stenting 2002  - Paroxysmal atrial tachycardia. Was taking Eliquis but had continued rectal bleeding. Outside cardiology considering loop recorder, possible Watchman  - Cath 5/22/24:  see results above  - Echo 2/22/24:  see results above    - METS (Metabolic Equivalents)  Unable to walk one block due to GALLARDO    Patient CANNOT perform 4 METS exercise without symptoms             Total Score: 1    Functional Capacity: Unable to complete 4 METS      RCRI-Moderate risk: Class 3  6.6% complication rate             Total Score: 2    RCRI: High Risk Surgery    RCRI: CAD        PULMONARY  - HERRERA w/ CPAP     - Denies asthma or inhaler use  - Tobacco History    History   Smoking Status    Never   Smokeless Tobacco    Never       GI  - Denies GERD  PONV Medium Risk  Total Score: 2           1 AN PONV: Patient is not a current smoker    1 AN PONV: Intended Post Op Opioids        /RENAL  - Creatinine  1.42 on 6/3/24 in records from [place].  1.37 today.  - Hx prostate cancer, s/p radiation July 2023    ENDOCRINE    - BMI: Estimated body mass index is 50.57 kg/m  as calculated from the following:    Height as of this encounter: 1.727 m (5' 8\").    Weight as of this encounter: 150.9 kg (332 lb 9.6 oz).  Class 3 Obesity (BMI > 40)  - DM2, A1c to be collected today  - Hold Jardiance x3d prior    HEME  VTE Low Risk 0.5%             Total Score: 4    VTE: Greater than 59 yrs old    VTE: BMI greater than 39    VTE: Male      - On Plavix, hold x5d prior  - On ASA 81 mg, hold DOS  - Had rectal bleeding w/ Eliquis due to proctitis  - Anemia, Hgb today is 9.5     MSK  Patient IS Frail             Total Score: 4    Frailty: Slower walking speed    Frailty: Decrease in strength    Frailty: Increased exhaustion    Frailty: Overall lack of energy      Due to the patient's risk, a referral to Physical Medicine and Rehabilitation has been made.      - s/p right TKA  - Chronic left knee pain        The " patient is aware that the final anesthesia plan will be decided by the assigned anesthesia provider on the date of service.      The patient is optimized for their procedure. AVS with information on surgery time/arrival time, meds and NPO status given by nursing staff. No further diagnostic testing indicated.      On the day of service:     Prep time: 16 minutes  Visit time: 19 minutes  Documentation time: 15 minutes  ------------------------------------------  Total time: 50 minutes      Martha Graff PA-C  Preoperative Assessment Center  [facility]  Clinic and Surgery Center  Phone: [phone]  Fax: [phone]

## 2024-07-01 NOTE — NURSING NOTE
Chief Complaint   Patient presents with    New Patient     New CV Sx        Vitals were taken, medications reconciled.    Nancy Hagen, Clinic Assistant   2:50 PM

## 2024-07-01 NOTE — LETTER
7/1/2024      RE: Jorje Hutchinson  [address] [place] [address]       Dear Colleague,    Thank you for the opportunity to participate in the care of your patient, Jorje Hutchinson, at the [facility] [place] [facility]. Please see a copy of my visit note below.    HPI  Jorje Hutchinson is a 74 year old male who was referred to me for evaluation for CABG,  Patient is being evaluated for comorbid conditions of HTN, HLD, SVT, HERRERA, CKD, DM2, morbid obesity, OA.     Mr. Hutchinson has a history of CAD. He is s/p coronary angioplasty w/ stent placement of the circumflex coronary artery in 2002. He endorses significant GALLARDO. A cath on 5/22/24 demonstrated 70% occulsion of the proximal LAD, 60% distal LAD, 30% proximal LCx, 30% distal LCx, 90  % pRCA.         History is obtained from the patient and chart review. He is accompanied by his wife.          Past Medical History       Past Medical History:   Diagnosis Date     CAD (coronary artery disease)       HLD (hyperlipidemia)       HTN (hypertension)       Morbid obesity (H)       HERRERA (obstructive sleep apnea)       Osteoarthritis       Proctitis       radiation induced     Prostate cancer (H)       SVT (supraventricular tachycardia) (H24)              Past Surgical History        Past Surgical History:   Procedure Laterality Date     AS TOTAL KNEE ARTHROPLASTY Right       CARDIAC CATHERIZATION   2002     COLONOSCOPY   2006     CORONARY ANGIOPLASTY   2002     w/ stent     CV CORONARY ANGIOGRAM   05/22/2024     EXC SKIN BENIG >4CM REMAINDR BODY Right 1989     forearm & neck            Prior to Admission Medications         Current Outpatient Medications   Medication Sig Dispense Refill     allopurinol (ZYLOPRIM) 100 MG tablet Take 100 mg by mouth 2 times daily         aspirin 81 MG EC tablet Take 81 mg by mouth every morning         clopidogrel (PLAVIX) 75 MG tablet Take 75 mg by mouth every morning          diltiazem (CARDIZEM) 30 MG tablet Take 30 mg by mouth as needed         diltiazem ER COATED BEADS (CARDIZEM CD/CARTIA XT) 120 MG 24 hr capsule Take 120 mg by mouth every morning         empagliflozin (JARDIANCE) 10 MG TABS tablet Take 10 mg by mouth every morning         exenatide ER (BYDUREON BCISE) 2 MG/0.85ML auto-injector Inject 2 mg Subcutaneous every 7 days Every Sunday         insulin glargine (LANTUS SOLOSTAR) 100 UNIT/ML pen Inject 38 Units Subcutaneous every morning         magnesium chloride 535 (64 Mg) MG TBEC CR tablet Take 535 mg by mouth 2 times daily         oxyBUTYnin ER (DITROPAN XL) 10 MG 24 hr tablet Take 10 mg by mouth every morning         Psyllium-Calcium (METAMUCIL PLUS CALCIUM) CAPS Take by mouth every morning         rosuvastatin (CRESTOR) 20 MG tablet Take 20 mg by mouth at bedtime         sotalol (BETAPACE) 80 MG tablet Take 80 mg by mouth every morning         spironolactone (ALDACTONE) 25 MG tablet Take 25 mg by mouth at bedtime         tamsulosin (FLOMAX) 0.4 MG capsule Take 0.4 mg by mouth at bedtime         torsemide (DEMADEX) 20 MG tablet Take 20 mg by mouth every morning         triamcinolone (KENALOG) 0.1 % external cream Apply 1 Application topically as needed         vitamin D3 (CHOLECALCIFEROL) 10 MCG (400 UNIT) capsule Take 1 capsule by mouth every morning                Allergies        Allergies   Allergen Reactions     Shellfish-Derived Products Anaphylaxis, Difficulty breathing and Photosensitivity     Shrimp         Other Reaction(s): Swelling of eye, Swelling of throat     Latex Blisters, Dermatitis, Hives, Itching and Rash            Social History  Social History            Socioeconomic History     Marital status:        Spouse name: Not on file     Number of children: Not on file     Years of education: Not on file     Highest education level: Not on file   Occupational History     Not on file   Tobacco Use     Smoking status: Never     Smokeless  tobacco: Never     Tobacco comments:       Occasional cigar 20+ years ago   Substance and Sexual Activity     Alcohol use: Yes       Comment: 1 per month     Drug use: Not Currently     Sexual activity: Not on file   Other Topics Concern     Not on file   Social History Narrative     Not on file      Social Determinants of Health           Financial Resource Strain: Low Risk  (7/8/2023)     Received from [facility]     Overall Financial Resource Strain (CARDIA)       Difficulty of Paying Living Expenses: Not hard at all   Food Insecurity: No Food Insecurity (7/8/2023)     Received from [facility]     Hunger Vital Sign       Worried About Running Out of Food in the Last Year: Never true       Ran Out of Food in the Last Year: Never true   Transportation Needs: No Transportation Needs (7/8/2023)     Received from [facility]     PRAPARE - Transportation       Lack of Transportation (Medical): No       Lack of Transportation (Non-Medical): No   Physical Activity: Inactive (7/8/2023)     Received from [facility]     Exercise Vital Sign       Days of Exercise per Week: 0 days       Minutes of Exercise per Session: 0 min   Stress: Not on file   Social Connections: Not on file   Interpersonal Safety: Unknown (7/8/2023)     Received from [facility]     Humiliation, Afraid, Rape, and Kick questionnaire       Fear of Current or Ex-Partner: No       Emotionally Abused: Not on file       Physically Abused: No       Sexually Abused: No   Housing Stability: Low Risk  (7/8/2023)     Received from [facility]     Housing Stability       What is your living situation today?: I have a steady place to live            Family History  Family History[]Expand by Default         Family History   Problem Relation Age of Onset     Anesthesia Reaction No family hx of       Deep Vein Thrombosis (DVT) No family hx of              Review of Systems  The complete review of systems is negative other than noted in the HPI or here.  /68  "(BP Location: Right arm, Patient Position: Sitting, Cuff Size: Adult Large)   Pulse 67   Temp 97.9  F (36.6  C) (Oral)   Resp 16   Ht 1.727 m (5' 8\")   Wt (!) 150.9 kg (332 lb 9.6 oz)   SpO2 99%   BMI 50.57 kg/m       Physical Exam  Constitutional: Awake, alert, cooperative, no apparent distress, and appears stated age.  Eyes: Pupils equal, round and reactive to light, extra ocular muscles intact, sclera clear, conjunctiva normal.  HENT: Normocephalic, oral pharynx with moist mucus membranes, good dentition. No goiter appreciated. No removable dental hardware.  Respiratory: Clear to auscultation bilaterally, no crackles or wheezing. No SOB when supine.  Cardiovascular: Distant heart sounds, Regular rate and rhythm, normal S1 and S2, and no murmur noted.  Carotids +1, no bruits. No edema. Palpable pulses to radial & DP arteries. 1+ edema.  GI: Normal bowel sounds, soft, obese, non-tender, no masses palpated.  Exam limited due to body habitus.  Skin: Warm and dry.  No rashes.   Musculoskeletal: Mildly limited extension ROM of neck. There is no redness, warmth, or swelling of the joints. Gross motor strength is normal.    Neurologic: no focal deficits.           PRIOR LABS/DIAGNOSTIC STUDIES:     All labs and imaging personally reviewed          Heart cath 5/22/24          Isamar stress test 2/22/24       Echo  2/22/24       LABS 6/3/24             The patient's records and results personally reviewed by me.     Outside records reviewed from:  [facility]     LAB/DIAGNOSTIC STUDIES TODAY:  CMP, CBC, A1c, T&S, prealb, INR, PTT, UA, US carotid, EKG     Assessment and Plan     Jorje Hutchinson is a 74 year old male with severe coronary artery disease. I agree with the recommendation of his cardiologist Dr Badillo to consider him for CABG. I discussed the risks and benefits of surgery with patient and family including the risks of death, bleeding, stroke, infection, renal failure and arrhythmias. I " also reviewed the angiogram with Dr Badillo, who agrees that there is disease in distal OM/KRISTYN branch and possible LPDA. We wll also place a clip in the left atrial appendage to reduce the need for anticoagulation.  Of concern is the bleeding that he has as a result of radiation proctitis and what effect anticoagulation with heparin at the time of CABG may have on this. We will have him review with the GI service to get their input in this issue.         Please do not hesitate to contact me if you have any questions/concerns.     Sincerely,     Deniz Wilson MD

## 2024-07-01 NOTE — PATIENT INSTRUCTIONS
You were seen today in the [facility]                     Cardiothoracic Surgery Clinic    Your surgeon was MD Dr. Steve Donohue recommends:    1. CABG   2. Dr. Wilson will follow with GI in [place], [place]  3. Orlando VERGARA will order a GI Consult at [facility]; will try to scheduled as soon as possible        Please feel free to call me with any questions or concerns.    Orlando Altamirano, RN Care Coordinator  Cardiothoracic Surgery Division  [facility]  [phone]

## 2024-07-01 NOTE — PATIENT INSTRUCTIONS
Preparing for Your Surgery      Name:  Jorje Hutchinson   MRN:  [id number]   :  1950   Today's Date:  2024       Arriving for surgery:  Surgery date:  Not scheduled  Arrival time:  To be determined    Please come to:     Please come to:       [facility]    [address] [place]  [address]     The [facility] Patient/Visitor Ramp is at [address]. Patients and visitors who self-park will receive the reduced hospital parking rate. If the Patient /Visitor Ramp is full, please follow the signs to the [employer] car park located at the main hospital entrance.       parking is available (24 hours/ 7 days a week)      Discounted parking pass options are available for patients and visitors. They can be purchased at the [employer] desk at the [facility] hospital entrance.     -    Stop at the security desk and they will direct surgery patients to the Surgery Check in and Family Lounge. [phone]        - If you need directions, a wheelchair or an escort please stop at the Information/security desk in the lobby.     What can I eat or drink?  -  You may eat and drink normally up to 8 hours prior to arrival time.   -  You may have clear liquids until 2 hours prior to arrival time.     Examples of clear liquids:  Water  Clear broth  Juices (apple, white grape, white cranberry  and cider) without pulp  Noncarbonated, powder based beverages  (lemonade and Sriram-Aid)  Sodas (Sprite, 7-Up, ginger ale and seltzer)  Coffee or tea (without milk or cream)  Gatorade    -  No Alcohol or cannabis products for at least 24 hours before surgery.     Which medicines can I take?    Hold Multivitamins for 7 days before surgery.  Hold Supplements for 7 days before surgery.  Hold Ibuprofen (Advil, Motrin) for 7 day(s) before surgery--unless otherwise directed by surgeon.  Hold Naproxen (Aleve) for 7 days  before surgery.  Hold Empagliflozin (Jardiance) for 3 days before surgery.  Hold Exenatide (Bydureon) for at least 7 days before surgery.  Hold Clopidogrel (Plavix) for 5 days before surgery.      -  DO NOT take these medications the day of surgery:  Aspirin  Magnesium  Metamucil  Spironolactone (Aldactone)  Torsemide (Demadex)  Topical medications      -  PLEASE TAKE these medications per your usual routine:  Allopurinol  Diltiazem (Cardizem)  Oxybutynin (Ditropan)  Rosuvastatin (Crestor)  Sotalol (Betapace)  Tamsulosin (Flomax)  Take 80% of your usual dose of Insulin Glargine (Lantus). Take 30 units the day of surgery rather than your usual dose of 38 units.        How do I prepare myself?  - Please take 2 showers (one the night prior to surgery and one the morning of surgery) using Scrubcare or Hibiclens soap.    Use this soap only from the neck to your toes. Do not use in genital area.     Leave the soap on your skin for one minute--then rinse thoroughly.      You may use your own shampoo and conditioner. No other hair products.      Sleep in clean sheets and wear clean clothes.  - Please remove all jewelry and body piercings.  - No lotions, deodorants or fragrance.  - No makeup or fingernail polish.   - Bring your ID and insurance card.    -If you use a CPAP machine, please bring the CPAP machine, tubing, and mask to hospital.    -If you have a Deep Brain Stimulator, Spinal Cord Stimulator, or any Neuro Stimulator device---you must bring the remote control to the hospital.      ALL PATIENTS GOING HOME THE SAME DAY OF SURGERY ARE REQUIRED TO HAVE A RESPONSIBLE ADULT TO DRIVE AND BE IN ATTENDANCE WITH THEM FOR 24 HOURS FOLLOWING SURGERY.    Covid testing policy as of 12/06/2022  Your surgeon will notify and schedule you for a COVID test if one is needed before surgery--please direct any questions or COVID symptoms to your surgeon      Questions or Concerns:    - For any questions regarding the day of surgery or  your hospital stay, please contact the Pre Admission Nursing Office at [phone].       - If you have health changes between today and your surgery, please call your surgeon.       - For questions after surgery, please call your surgeons office.           Current Visitor Guidelines    You may have 2 visitors in the pre op area.    Visiting hours: 8 a.m. to 8:30 p.m.    Patients confirmed or suspected to have symptoms of COVID 19 or flu:     No visitors allowed for adult patients.   Children (under age 18) can have 1 named visitor.     People who are sick or showing symptoms of COVID 19 or flu:    Are not allowed to visit patients--we can only make exceptions in special situations.       Please follow these guidelines for your visit:          Please maintain social distance          Masking is optional--however at times you may be asked to wear a mask for the safety of yourself and others     Clean your hands with alcohol hand . Do this when you arrive at and leave the building and patient room,    And again after you touch your mask or anything in the room.     Go directly to and from the room you are visiting.     Stay in the patient s room during your visit. Limit going to other places in the hospital as much as possible     Leave bags and jackets at home or in the car.     For everyone s health, please don t come and go during your visit. That includes for smoking   during your visit.

## 2024-07-02 ENCOUNTER — TELEPHONE (OUTPATIENT)
Dept: CARDIOLOGY | Facility: CLINIC | Age: 74
End: 2024-07-02
Payer: MEDICARE

## 2024-07-02 ENCOUNTER — PREP FOR PROCEDURE (OUTPATIENT)
Dept: CARDIOLOGY | Facility: CLINIC | Age: 74
End: 2024-07-02
Payer: MEDICARE

## 2024-07-02 DIAGNOSIS — I25.10 CORONARY ARTERY DISEASE INVOLVING NATIVE CORONARY ARTERY OF NATIVE HEART, UNSPECIFIED WHETHER ANGINA PRESENT: Primary | ICD-10-CM

## 2024-07-05 ENCOUNTER — TELEPHONE (OUTPATIENT)
Dept: GASTROENTEROLOGY | Facility: CLINIC | Age: 74
End: 2024-07-05
Payer: MEDICARE

## 2024-07-05 LAB
ATRIAL RATE - MUSE: 63 BPM
DIASTOLIC BLOOD PRESSURE - MUSE: NORMAL MMHG
INTERPRETATION ECG - MUSE: NORMAL
P AXIS - MUSE: -25 DEGREES
PR INTERVAL - MUSE: 224 MS
QRS DURATION - MUSE: 144 MS
QT - MUSE: 482 MS
QTC - MUSE: 493 MS
R AXIS - MUSE: 56 DEGREES
SYSTOLIC BLOOD PRESSURE - MUSE: NORMAL MMHG
T AXIS - MUSE: -10 DEGREES
VENTRICULAR RATE- MUSE: 63 BPM

## 2024-07-05 NOTE — TELEPHONE ENCOUNTER
received a message from VENESSA FRITZ to help get Pt scheduled for a new GI emergent appointment with an MD, deon ADAMS. Writer called and left a message for the Pt, along with call back number.    Duyenr will send Pt a [employer] message and offer Pt an appointment with Dr. Rincon on 7/9/2024 at 9:20 AM.

## 2024-07-05 NOTE — TELEPHONE ENCOUNTER
Pt and Pt's wife called back. They accepted an appointment with Dr. Rincon on 7/9/2024 at 9:20 AM for an in-person clinic visit.

## 2024-07-09 ENCOUNTER — OFFICE VISIT (OUTPATIENT)
Dept: GASTROENTEROLOGY | Facility: CLINIC | Age: 74
End: 2024-07-09
Payer: MEDICARE

## 2024-07-09 VITALS
BODY MASS INDEX: 47.74 KG/M2 | WEIGHT: 315 LBS | OXYGEN SATURATION: 98 % | SYSTOLIC BLOOD PRESSURE: 113 MMHG | DIASTOLIC BLOOD PRESSURE: 65 MMHG | HEIGHT: 68 IN | HEART RATE: 60 BPM

## 2024-07-09 DIAGNOSIS — K62.7 RADIATION PROCTITIS: ICD-10-CM

## 2024-07-09 DIAGNOSIS — D50.0 IRON DEFICIENCY ANEMIA DUE TO CHRONIC BLOOD LOSS: ICD-10-CM

## 2024-07-09 DIAGNOSIS — K62.5 RECTAL BLEEDING: Primary | ICD-10-CM

## 2024-07-09 DIAGNOSIS — R71.8 OTHER ABNORMALITY OF RED BLOOD CELLS: ICD-10-CM

## 2024-07-09 PROBLEM — E11.9 DIABETES MELLITUS, TYPE 2 (H): Status: ACTIVE | Noted: 2024-07-09

## 2024-07-09 LAB
BASOPHILS # BLD AUTO: 0 10E3/UL (ref 0–0.2)
BASOPHILS NFR BLD AUTO: 1 %
EOSINOPHIL # BLD AUTO: 0.2 10E3/UL (ref 0–0.7)
EOSINOPHIL NFR BLD AUTO: 4 %
ERYTHROCYTE [DISTWIDTH] IN BLOOD BY AUTOMATED COUNT: 14 % (ref 10–15)
FERRITIN SERPL-MCNC: 20 NG/ML (ref 31–409)
HCT VFR BLD AUTO: 27.6 % (ref 40–53)
HCT VFR BLD AUTO: 27.6 % (ref 40–53)
HGB BLD-MCNC: 8.9 G/DL (ref 13.3–17.7)
IMM GRANULOCYTES # BLD: 0 10E3/UL
IMM GRANULOCYTES NFR BLD: 1 %
IRON BINDING CAPACITY (ROCHE): 334 UG/DL (ref 240–430)
IRON SATN MFR SERPL: 8 % (ref 15–46)
IRON SERPL-MCNC: 28 UG/DL (ref 61–157)
LYMPHOCYTES # BLD AUTO: 0.7 10E3/UL (ref 0.8–5.3)
LYMPHOCYTES NFR BLD AUTO: 11 %
MCH RBC QN AUTO: 28.3 PG (ref 26.5–33)
MCHC RBC AUTO-ENTMCNC: 32.2 G/DL (ref 31.5–36.5)
MCV RBC AUTO: 88 FL (ref 78–100)
MONOCYTES # BLD AUTO: 0.5 10E3/UL (ref 0–1.3)
MONOCYTES NFR BLD AUTO: 9 %
NEUTROPHILS # BLD AUTO: 4.3 10E3/UL (ref 1.6–8.3)
NEUTROPHILS NFR BLD AUTO: 75 %
NRBC # BLD AUTO: 0 10E3/UL
NRBC BLD AUTO-RTO: 0 /100
PLATELET # BLD AUTO: 184 10E3/UL (ref 150–450)
RBC # BLD AUTO: 3.14 10E6/UL (ref 4.4–5.9)
TRANSFERRIN SERPL-MCNC: 269 MG/DL (ref 200–360)
VIT B12 SERPL-MCNC: 287 PG/ML (ref 232–1245)
WBC # BLD AUTO: 5.8 10E3/UL (ref 4–11)

## 2024-07-09 PROCEDURE — 36415 COLL VENOUS BLD VENIPUNCTURE: CPT | Performed by: INTERNAL MEDICINE

## 2024-07-09 PROCEDURE — 84466 ASSAY OF TRANSFERRIN: CPT | Performed by: INTERNAL MEDICINE

## 2024-07-09 PROCEDURE — 99205 OFFICE O/P NEW HI 60 MIN: CPT | Performed by: INTERNAL MEDICINE

## 2024-07-09 PROCEDURE — 83550 IRON BINDING TEST: CPT | Performed by: INTERNAL MEDICINE

## 2024-07-09 PROCEDURE — 82607 VITAMIN B-12: CPT | Performed by: INTERNAL MEDICINE

## 2024-07-09 PROCEDURE — 82728 ASSAY OF FERRITIN: CPT | Performed by: INTERNAL MEDICINE

## 2024-07-09 PROCEDURE — 85025 COMPLETE CBC W/AUTO DIFF WBC: CPT | Performed by: INTERNAL MEDICINE

## 2024-07-09 PROCEDURE — 82747 ASSAY OF FOLIC ACID RBC: CPT | Mod: 90 | Performed by: INTERNAL MEDICINE

## 2024-07-09 PROCEDURE — 99000 SPECIMEN HANDLING OFFICE-LAB: CPT | Performed by: INTERNAL MEDICINE

## 2024-07-09 PROCEDURE — 83540 ASSAY OF IRON: CPT | Performed by: INTERNAL MEDICINE

## 2024-07-09 ASSESSMENT — PAIN SCALES - GENERAL: PAINLEVEL: NO PAIN (0)

## 2024-07-09 NOTE — NURSING NOTE
"Chief Complaint   Patient presents with    New Patient     New consult for rectal bleeding.     He requests these members of his care team be copied on today's visit information:  PCP: Steve Zamora    Referring Provider:  Deniz Wilson MD  [address] [place] [address] [address]  [place] [address]    Vitals:    07/09/24 0903   BP: 113/65   BP Location: Left arm   Patient Position: Sitting   Cuff Size: Adult Large   Pulse: 60   SpO2: 98%   Weight: (!) 151.2 kg (333 lb 4.8 oz)   Height: 1.727 m (5' 8\")     Body mass index is 50.68 kg/m .    Medications were reconciled.        Karyn Guan CMA    "

## 2024-07-09 NOTE — LETTER
7/9/2024         RE: Jorje Hutchinson  [address] [facility] [place] [address]      GI CLINIC VISIT    CC/REFERRING MD:  Deniz Wilson  REASON FOR CONSULTATION:   Deniz Wilson for   Chief Complaint   Patient presents with     New Patient     New consult for rectal bleeding.       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      The risk of having severe, life-threatening bleeding in the setting of radiation proctitis is low w/ the increased AC needed for the CABG, although it is likely that he will have bleeding. We discussed extensively the treatment options for this. Has had two sessions of APC w/ ongoing bleeding previously, so the next step will be RFA. However, given that his CABG is scheduled for 7/30, the risks of performing the procedure would be that the mucosa may not be completely healed, which might make his bleeding risk higher around the time of his surgery. Another point is that he is at high risk for anesthesia and possibly would not do well if he were to develop a complication from the procedure.   - We will schedule flex sig for several months post-CABG   - Recheck labs, if iron is low, IV iron infusion prior to surgery    Patient was seen and d/w Dr. Sergey Gruber MD  GI Fellow    HPI  73yo M w/ PMH of prostate cx s/p radiation in 2023, CAD s/p stent, afib, HTN, HERRERA, and DMII who is presenting for eval of radiation proctitis and rectal bleeding prior to CABG..     Hx of radiation for prostate cx in 2023. In December, he developed rectal bleeding. Sometimes the blood would be mixed w/ stool, sometimes it would be pure blood w/ clots. He went to the hospital, Hgb was reportedly down to 8 (he has travelled here from [place] and we don't have a lot of the records). He underwent colonoscopy w/ ablation which helped, although rectal bleeding  started again in April. Flex sig performed w/ ablation. He has continued to have rectal bleeding every 4 days or so. He has no issues with constipation.     He recently saw cardiology at a routine apt and it was discovered that he will need CABG (scheduled for 7/30).     ROS:    Negative per HPI    PROBLEM LIST  There are no problems to display for this patient.      PERTINENT PAST MEDICAL HISTORY:  Past Medical History:   Diagnosis Date     CAD (coronary artery disease)      HLD (hyperlipidemia)      HTN (hypertension)      Morbid obesity (H)      HERRERA (obstructive sleep apnea)      Osteoarthritis      Proctitis     radiation induced     Prostate cancer (H)      SVT (supraventricular tachycardia) (H24)        PREVIOUS SURGERIES:  Past Surgical History:   Procedure Laterality Date     AS TOTAL KNEE ARTHROPLASTY Right      CARDIAC CATHERIZATION  2002     COLONOSCOPY  2006     CORONARY ANGIOPLASTY  2002    w/ stent     CV CORONARY ANGIOGRAM  05/22/2024     EXC SKIN BENIG >4CM REMAINDR BODY Right 1989    forearm & neck       PREVIOUS ENDOSCOPY:  Unable to see records    ALLERGIES:     Allergies   Allergen Reactions     Shellfish-Derived Products Anaphylaxis, Difficulty breathing and Photosensitivity     Shrimp      Other Reaction(s): Swelling of eye, Swelling of throat     Latex Blisters, Dermatitis, Hives, Itching and Rash       PERTINENT MEDICATIONS:    Current Outpatient Medications:      allopurinol (ZYLOPRIM) 100 MG tablet, Take 100 mg by mouth 2 times daily, Disp: , Rfl:      aspirin 81 MG EC tablet, Take 81 mg by mouth every morning, Disp: , Rfl:      clopidogrel (PLAVIX) 75 MG tablet, Take 75 mg by mouth every morning, Disp: , Rfl:      diltiazem (CARDIZEM) 30 MG tablet, Take 30 mg by mouth as needed, Disp: , Rfl:      diltiazem ER COATED BEADS (CARDIZEM CD/CARTIA XT) 120 MG 24 hr capsule, Take 120 mg by mouth every morning, Disp: , Rfl:      empagliflozin (JARDIANCE) 10 MG TABS tablet, Take 10 mg by mouth every  morning, Disp: , Rfl:      exenatide ER (BYDUREON BCISE) 2 MG/0.85ML auto-injector, Inject 2 mg Subcutaneous every 7 days Every Sunday, Disp: , Rfl:      insulin glargine (LANTUS SOLOSTAR) 100 UNIT/ML pen, Inject 38 Units Subcutaneous every morning, Disp: , Rfl:      magnesium chloride 535 (64 Mg) MG TBEC CR tablet, Take 535 mg by mouth 2 times daily, Disp: , Rfl:      oxyBUTYnin ER (DITROPAN XL) 10 MG 24 hr tablet, Take 10 mg by mouth every morning, Disp: , Rfl:      rosuvastatin (CRESTOR) 20 MG tablet, Take 20 mg by mouth at bedtime, Disp: , Rfl:      sotalol (BETAPACE) 80 MG tablet, Take 80 mg by mouth every morning, Disp: , Rfl:      spironolactone (ALDACTONE) 25 MG tablet, Take 25 mg by mouth at bedtime, Disp: , Rfl:      tamsulosin (FLOMAX) 0.4 MG capsule, Take 0.4 mg by mouth at bedtime, Disp: , Rfl:      torsemide (DEMADEX) 20 MG tablet, Take 20 mg by mouth every morning, Disp: , Rfl:      triamcinolone (KENALOG) 0.1 % external cream, Apply 1 Application topically as needed, Disp: , Rfl:      vitamin D3 (CHOLECALCIFEROL) 10 MCG (400 UNIT) capsule, Take 1 capsule by mouth every morning, Disp: , Rfl:     SOCIAL HISTORY:  Social History     Socioeconomic History     Marital status:      Spouse name: Not on file     Number of children: Not on file     Years of education: Not on file     Highest education level: Not on file   Occupational History     Not on file   Tobacco Use     Smoking status: Never     Smokeless tobacco: Never     Tobacco comments:     Occasional cigar 20+ years ago   Vaping Use     Vaping status: Never Used   Substance and Sexual Activity     Alcohol use: Yes     Comment: 1 per month     Drug use: Not Currently     Sexual activity: Not on file   Other Topics Concern     Not on file   Social History Narrative     Not on file     Social Determinants of Health     Financial Resource Strain: Low Risk  (7/8/2023)    Received from [facility]    Overall Financial Resource Strain (CARDIA)     "  Difficulty of Paying Living Expenses: Not hard at all   Food Insecurity: No Food Insecurity (7/8/2023)    Received from [facility]    Hunger Vital Sign      Worried About Running Out of Food in the Last Year: Never true      Ran Out of Food in the Last Year: Never true   Transportation Needs: No Transportation Needs (7/8/2023)    Received from [facility]    PRAPARE - Transportation      Lack of Transportation (Medical): No      Lack of Transportation (Non-Medical): No   Physical Activity: Inactive (7/8/2023)    Received from [facility]    Exercise Vital Sign      Days of Exercise per Week: 0 days      Minutes of Exercise per Session: 0 min   Stress: Not on file   Social Connections: Not on file   Interpersonal Safety: Unknown (7/8/2023)    Received from [facility]    Humiliation, Afraid, Rape, and Kick questionnaire      Fear of Current or Ex-Partner: No      Emotionally Abused: Not on file      Physically Abused: No      Sexually Abused: No   Housing Stability: Low Risk  (7/8/2023)    Received from [facility]    Housing Stability      What is your living situation today?: I have a steady place to live       FAMILY HISTORY:  Family History   Problem Relation Age of Onset     Anesthesia Reaction No family hx of      Deep Vein Thrombosis (DVT) No family hx of        Past/family/social history reviewed and no changes    PHYSICAL EXAMINATION:  Constitutional: aaox3, cooperative, pleasant, not dyspneic/diaphoretic, no acute distress  Vitals reviewed: /65 (BP Location: Left arm, Patient Position: Sitting, Cuff Size: Adult Large)   Pulse 60   Ht 1.727 m (5' 8\")   Wt (!) 151.2 kg (333 lb 4.8 oz)   SpO2 98%   BMI 50.68 kg/m    Wt:   Wt Readings from Last 2 Encounters:   07/09/24 (!) 151.2 kg (333 lb 4.8 oz)   07/01/24 (!) 150.9 kg (332 lb 11.2 oz)      Eyes: Sclera anicteric/injected  Ears/nose/mouth/throat: hearing intact  Respiratory: Unlabored breathing  Abd: Nondistended, nontender, no peritoneal " signs  Skin: warm, perfused, no jaundice  Psych: Normal affect  MSK: Normal gait      PERTINENT STUDIES:    Lab on 07/01/2024   Component Date Value Ref Range Status     Color Urine 07/01/2024 Straw  Colorless, Straw, Light Yellow, Yellow Final     Appearance Urine 07/01/2024 Clear  Clear Final     Glucose Urine 07/01/2024 500 (A)  Negative mg/dL Final     Bilirubin Urine 07/01/2024 Negative  Negative Final     Ketones Urine 07/01/2024 Negative  Negative mg/dL Final     Specific Gravity Urine 07/01/2024 1.009  1.003 - 1.035 Final     Blood Urine 07/01/2024 Negative  Negative Final     pH Urine 07/01/2024 5.0  5.0 - 7.0 Final     Protein Albumin Urine 07/01/2024 Negative  Negative mg/dL Final     Urobilinogen Urine 07/01/2024 Normal  Normal, 2.0 mg/dL Final     Nitrite Urine 07/01/2024 Negative  Negative Final     Leukocyte Esterase Urine 07/01/2024 Negative  Negative Final     RBC Urine 07/01/2024 <1  <=2 /HPF Final     WBC Urine 07/01/2024 1  <=5 /HPF Final     Squamous Epithelials Urine 07/01/2024 <1  <=1 /HPF Final     Hyaline Casts Urine 07/01/2024 18 (H)  <=2 /LPF Final     Prealbumin 07/01/2024 22.3  20.0 - 40.0 mg/dL Final     aPTT 07/01/2024 27  22 - 38 Seconds Final     INR 07/01/2024 1.18 (H)  0.85 - 1.15 Final     Hemoglobin A1C 07/01/2024 6.7 (H)  <5.7 % Final     Sodium 07/01/2024 136  135 - 145 mmol/L Final     Potassium 07/01/2024 4.3  3.4 - 5.3 mmol/L Final     Carbon Dioxide (CO2) 07/01/2024 21 (L)  22 - 29 mmol/L Final     Anion Gap 07/01/2024 14  7 - 15 mmol/L Final     Urea Nitrogen 07/01/2024 32.9 (H)  8.0 - 23.0 mg/dL Final     Creatinine 07/01/2024 1.37 (H)  0.67 - 1.17 mg/dL Final     GFR Estimate 07/01/2024 54 (L)  >60 mL/min/1.73m2 Final     Calcium 07/01/2024 9.4  8.8 - 10.2 mg/dL Final     Chloride 07/01/2024 101  98 - 107 mmol/L Final     Glucose 07/01/2024 191 (H)  70 - 99 mg/dL Final     Alkaline Phosphatase 07/01/2024 110  40 - 150 U/L Final     AST 07/01/2024 28  0 - 45 U/L Final      ALT 07/01/2024 7  0 - 70 U/L Final     Protein Total 07/01/2024 6.2 (L)  6.4 - 8.3 g/dL Final     Albumin 07/01/2024 3.9  3.5 - 5.2 g/dL Final     Bilirubin Total 07/01/2024 0.3  <=1.2 mg/dL Final     WBC Count 07/01/2024 6.4  4.0 - 11.0 10e3/uL Final     RBC Count 07/01/2024 3.24 (L)  4.40 - 5.90 10e6/uL Final     Hemoglobin 07/01/2024 9.5 (L)  13.3 - 17.7 g/dL Final     Hematocrit 07/01/2024 29.2 (L)  40.0 - 53.0 % Final     MCV 07/01/2024 90  78 - 100 fL Final     MCH 07/01/2024 29.3  26.5 - 33.0 pg Final     MCHC 07/01/2024 32.5  31.5 - 36.5 g/dL Final     RDW 07/01/2024 13.9  10.0 - 15.0 % Final     Platelet Count 07/01/2024 183  150 - 450 10e3/uL Final     ABO/RH(D) 07/01/2024 A POS   Final     Antibody Screen 07/01/2024 Negative  Negative Final     SPECIMEN EXPIRATION DATE 07/01/2024 [id number]   Final     Hold Specimen 07/01/2024 [facility]   Final           Attestation signed by Sean Rincon MD at 7/9/2024 12:41 PM:  ATTENDING ATTESTATION:     We evaluated this patient today for ongoing rectal bleeding and anemia in the setting of prior prostate radiotherapy in 2023 with known radiation proctitis.  He has significant cardiovascular disease and is planned for CABG later this month and we are asked to consult on restratification in the setting of anticoagulation as well as options for further therapy.  So far, he has had endoscopic therapy with 2 sessions of argon plasma coagulation in January 2024 and April 2024 with some improvement in rectal bleeding.  He has also had iron infusions x 2.  The rectal bleeding symptoms currently are mild.       We discussed the nature of radiation proctitis at length today and risks associated with anticoagulation treatment in the setting of surgery.  With systemic anticoagulation, it is quite possible that rectal bleeding could occur but it will likely be low-volume.  These bleeds do not tend to be life-threatening but can cause anemia with protracted bleeding  over time.  Therefore, I believe that proceeding with a surgical intervention prior to definitive therapy for radiation proctitis is quite reasonable. Should he develop rectal bleeding after his CABG, then I would recommend initiation of sucralfate enemas (20 mL of a 10 percent sucralfate suspension in water twice daily ).  If this fails to still control rectal bleeding in the hospital setting, then inpatient GI consultation can be obtained for consideration of endoscopic therapy.     After definitive surgical therapy for cardiac issues, I would recommend further treatment for his radiation proctitis unless rectal bleeding ceases.  Given that he has had argon plasma coagulation x 2, I would recommend proceeding with radiofrequency ablation, tentatively planning for 2 sessions.  We will hold a spot for this in about 3 to 4 months so that we can proceed with this provided his surgery goes well.  It will be preferable to hold antiplatelet/anticoagulation medications prior to RFA though baby aspirin is acceptable.  Further endoscopic therapy to consider if needed could also include cryoablation     Patient was discussed and seen by Sean Rincon MD. The plan of care and pertinent data/imaging were also reviewed with GI Fellow as well. Time documented reflects my direct time and involvement in the care of this patient.  Agree with the joint assessment and plan as delineated above.     Please contact me with any further questions.     Sean Rincon MD  [facility]  Division of Gastroenterology  [phone]     A total of 70 minutes was spent with reviewing the chart, discussing with the patient, documentation and coordination of care on the day of this encounter.     MD Sean Morales MD

## 2024-07-09 NOTE — LETTER
7/9/2024      Jorje Hutchinson  [address] [facility] [place] [address]      Dear Colleague,    Thank you for referring your patient, Jorje Hutchinson, to the [facility]. Please see a copy of my visit note below.    GI CLINIC VISIT    CC/REFERRING MD:  Deniz Wilson  REASON FOR CONSULTATION:   Deniz Wilson for   Chief Complaint   Patient presents with     New Patient     New consult for rectal bleeding.       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      The risk of having severe, life-threatening bleeding in the setting of radiation proctitis is low w/ the increased AC needed for the CABG, although it is likely that he will have bleeding. We discussed extensively the treatment options for this. Has had two sessions of APC w/ ongoing bleeding previously, so the next step will be RFA. However, given that his CABG is scheduled for 7/30, the risks of performing the procedure would be that the mucosa may not be completely healed, which might make his bleeding risk higher around the time of his surgery. Another point is that he is at high risk for anesthesia and possibly would not do well if he were to develop a complication from the procedure.   - We will schedule flex sig for several months post-CABG   - Recheck labs, if iron is low, IV iron infusion prior to surgery    Patient was seen and d/w Dr. 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     Hx of radiation for prostate cx in 2023. In December, he developed rectal bleeding. Sometimes the blood would be mixed w/ stool, sometimes it would be pure blood w/ clots. He went to the hospital, Hgb was reportedly down to 8  (he has travelled here from [place] and we don't have a lot of the records). He underwent colonoscopy w/ ablation which helped, although rectal bleeding started again in April. Flex sig performed w/ ablation. He has continued to have rectal bleeding every 4 days or so. He has no issues with constipation.     He recently saw cardiology at a routine apt and it was discovered that he will need CABG (scheduled for 7/30).     ROS:    Negative per HPI    PROBLEM LIST  There are no problems to display for this patient.      PERTINENT PAST MEDICAL HISTORY:  Past Medical History:   Diagnosis Date     CAD (coronary artery disease)      HLD (hyperlipidemia)      HTN (hypertension)      Morbid obesity (H)      HERRERA (obstructive sleep apnea)      Osteoarthritis      Proctitis     radiation induced     Prostate cancer (H)      SVT (supraventricular tachycardia) (H24)        PREVIOUS SURGERIES:  Past Surgical History:   Procedure Laterality Date     AS TOTAL KNEE ARTHROPLASTY Right      CARDIAC CATHERIZATION  2002     COLONOSCOPY  2006     CORONARY ANGIOPLASTY  2002    w/ stent     CV CORONARY ANGIOGRAM  05/22/2024     EXC SKIN BENIG >4CM REMAINDR BODY Right 1989    forearm & neck       PREVIOUS ENDOSCOPY:  Unable to see records    ALLERGIES:     Allergies   Allergen Reactions     Shellfish-Derived Products Anaphylaxis, Difficulty breathing and Photosensitivity     Shrimp      Other Reaction(s): Swelling of eye, Swelling of throat     Latex Blisters, Dermatitis, Hives, Itching and Rash       PERTINENT MEDICATIONS:    Current Outpatient Medications:      allopurinol (ZYLOPRIM) 100 MG tablet, Take 100 mg by mouth 2 times daily, Disp: , Rfl:      aspirin 81 MG EC tablet, Take 81 mg by mouth every morning, Disp: , Rfl:      clopidogrel (PLAVIX) 75 MG tablet, Take 75 mg by mouth every morning, Disp: , Rfl:      diltiazem (CARDIZEM) 30 MG tablet, Take 30 mg by mouth as needed, Disp: , Rfl:      diltiazem ER COATED BEADS (CARDIZEM CD/CARTIA  XT) 120 MG 24 hr capsule, Take 120 mg by mouth every morning, Disp: , Rfl:      empagliflozin (JARDIANCE) 10 MG TABS tablet, Take 10 mg by mouth every morning, Disp: , Rfl:      exenatide ER (BYDUREON BCISE) 2 MG/0.85ML auto-injector, Inject 2 mg Subcutaneous every 7 days Every Sunday, Disp: , Rfl:      insulin glargine (LANTUS SOLOSTAR) 100 UNIT/ML pen, Inject 38 Units Subcutaneous every morning, Disp: , Rfl:      magnesium chloride 535 (64 Mg) MG TBEC CR tablet, Take 535 mg by mouth 2 times daily, Disp: , Rfl:      oxyBUTYnin ER (DITROPAN XL) 10 MG 24 hr tablet, Take 10 mg by mouth every morning, Disp: , Rfl:      rosuvastatin (CRESTOR) 20 MG tablet, Take 20 mg by mouth at bedtime, Disp: , Rfl:      sotalol (BETAPACE) 80 MG tablet, Take 80 mg by mouth every morning, Disp: , Rfl:      spironolactone (ALDACTONE) 25 MG tablet, Take 25 mg by mouth at bedtime, Disp: , Rfl:      tamsulosin (FLOMAX) 0.4 MG capsule, Take 0.4 mg by mouth at bedtime, Disp: , Rfl:      torsemide (DEMADEX) 20 MG tablet, Take 20 mg by mouth every morning, Disp: , Rfl:      triamcinolone (KENALOG) 0.1 % external cream, Apply 1 Application topically as needed, Disp: , Rfl:      vitamin D3 (CHOLECALCIFEROL) 10 MCG (400 UNIT) capsule, Take 1 capsule by mouth every morning, Disp: , Rfl:     SOCIAL HISTORY:  Social History     Socioeconomic History     Marital status:      Spouse name: Not on file     Number of children: Not on file     Years of education: Not on file     Highest education level: Not on file   Occupational History     Not on file   Tobacco Use     Smoking status: Never     Smokeless tobacco: Never     Tobacco comments:     Occasional cigar 20+ years ago   Vaping Use     Vaping status: Never Used   Substance and Sexual Activity     Alcohol use: Yes     Comment: 1 per month     Drug use: Not Currently     Sexual activity: Not on file   Other Topics Concern     Not on file   Social History Narrative     Not on file     Social  "Determinants of Health     Financial Resource Strain: Low Risk  (7/8/2023)    Received from [facility]    Overall Financial Resource Strain (CARDIA)      Difficulty of Paying Living Expenses: Not hard at all   Food Insecurity: No Food Insecurity (7/8/2023)    Received from [facility]    Hunger Vital Sign      Worried About Running Out of Food in the Last Year: Never true      Ran Out of Food in the Last Year: Never true   Transportation Needs: No Transportation Needs (7/8/2023)    Received from [facility]    PRAPARE - Transportation      Lack of Transportation (Medical): No      Lack of Transportation (Non-Medical): No   Physical Activity: Inactive (7/8/2023)    Received from [facility]    Exercise Vital Sign      Days of Exercise per Week: 0 days      Minutes of Exercise per Session: 0 min   Stress: Not on file   Social Connections: Not on file   Interpersonal Safety: Unknown (7/8/2023)    Received from [facility]    Humiliation, Afraid, Rape, and Kick questionnaire      Fear of Current or Ex-Partner: No      Emotionally Abused: Not on file      Physically Abused: No      Sexually Abused: No   Housing Stability: Low Risk  (7/8/2023)    Received from [facility]    Housing Stability      What is your living situation today?: I have a steady place to live       FAMILY HISTORY:  Family History   Problem Relation Age of Onset     Anesthesia Reaction No family hx of      Deep Vein Thrombosis (DVT) No family hx of        Past/family/social history reviewed and no changes    PHYSICAL EXAMINATION:  Constitutional: aaox3, cooperative, pleasant, not dyspneic/diaphoretic, no acute distress  Vitals reviewed: /65 (BP Location: Left arm, Patient Position: Sitting, Cuff Size: Adult Large)   Pulse 60   Ht 1.727 m (5' 8\")   Wt (!) 151.2 kg (333 lb 4.8 oz)   SpO2 98%   BMI 50.68 kg/m    Wt:   Wt Readings from Last 2 Encounters:   07/09/24 (!) 151.2 kg (333 lb 4.8 oz)   07/01/24 (!) 150.9 kg (332 lb 11.2 oz)    "   Eyes: Sclera anicteric/injected  Ears/nose/mouth/throat: hearing intact  Respiratory: Unlabored breathing  Abd: Nondistended, nontender, no peritoneal signs  Skin: warm, perfused, no jaundice  Psych: Normal affect  MSK: Normal gait      PERTINENT STUDIES:    Lab on 07/01/2024   Component Date Value Ref Range Status     Color Urine 07/01/2024 Straw  Colorless, Straw, Light Yellow, Yellow Final     Appearance Urine 07/01/2024 Clear  Clear Final     Glucose Urine 07/01/2024 500 (A)  Negative mg/dL Final     Bilirubin Urine 07/01/2024 Negative  Negative Final     Ketones Urine 07/01/2024 Negative  Negative mg/dL Final     Specific Gravity Urine 07/01/2024 1.009  1.003 - 1.035 Final     Blood Urine 07/01/2024 Negative  Negative Final     pH Urine 07/01/2024 5.0  5.0 - 7.0 Final     Protein Albumin Urine 07/01/2024 Negative  Negative mg/dL Final     Urobilinogen Urine 07/01/2024 Normal  Normal, 2.0 mg/dL Final     Nitrite Urine 07/01/2024 Negative  Negative Final     Leukocyte Esterase Urine 07/01/2024 Negative  Negative Final     RBC Urine 07/01/2024 <1  <=2 /HPF Final     WBC Urine 07/01/2024 1  <=5 /HPF Final     Squamous Epithelials Urine 07/01/2024 <1  <=1 /HPF Final     Hyaline Casts Urine 07/01/2024 18 (H)  <=2 /LPF Final     Prealbumin 07/01/2024 22.3  20.0 - 40.0 mg/dL Final     aPTT 07/01/2024 27  22 - 38 Seconds Final     INR 07/01/2024 1.18 (H)  0.85 - 1.15 Final     Hemoglobin A1C 07/01/2024 6.7 (H)  <5.7 % Final     Sodium 07/01/2024 136  135 - 145 mmol/L Final     Potassium 07/01/2024 4.3  3.4 - 5.3 mmol/L Final     Carbon Dioxide (CO2) 07/01/2024 21 (L)  22 - 29 mmol/L Final     Anion Gap 07/01/2024 14  7 - 15 mmol/L Final     Urea Nitrogen 07/01/2024 32.9 (H)  8.0 - 23.0 mg/dL Final     Creatinine 07/01/2024 1.37 (H)  0.67 - 1.17 mg/dL Final     GFR Estimate 07/01/2024 54 (L)  >60 mL/min/1.73m2 Final     Calcium 07/01/2024 9.4  8.8 - 10.2 mg/dL Final     Chloride 07/01/2024 101  98 - 107 mmol/L Final      Glucose 07/01/2024 191 (H)  70 - 99 mg/dL Final     Alkaline Phosphatase 07/01/2024 110  40 - 150 U/L Final     AST 07/01/2024 28  0 - 45 U/L Final     ALT 07/01/2024 7  0 - 70 U/L Final     Protein Total 07/01/2024 6.2 (L)  6.4 - 8.3 g/dL Final     Albumin 07/01/2024 3.9  3.5 - 5.2 g/dL Final     Bilirubin Total 07/01/2024 0.3  <=1.2 mg/dL Final     WBC Count 07/01/2024 6.4  4.0 - 11.0 10e3/uL Final     RBC Count 07/01/2024 3.24 (L)  4.40 - 5.90 10e6/uL Final     Hemoglobin 07/01/2024 9.5 (L)  13.3 - 17.7 g/dL Final     Hematocrit 07/01/2024 29.2 (L)  40.0 - 53.0 % Final     MCV 07/01/2024 90  78 - 100 fL Final     MCH 07/01/2024 29.3  26.5 - 33.0 pg Final     MCHC 07/01/2024 32.5  31.5 - 36.5 g/dL Final     RDW 07/01/2024 13.9  10.0 - 15.0 % Final     Platelet Count 07/01/2024 183  150 - 450 10e3/uL Final     ABO/RH(D) 07/01/2024 A POS   Final     Antibody Screen 07/01/2024 Negative  Negative Final     SPECIMEN EXPIRATION DATE 07/01/2024 [id number]   Final     Hold Specimen 07/01/2024 [facility]   Final           Attestation signed by Sean Rincon MD at 7/9/2024 12:41 PM:  ATTENDING ATTESTATION:     We evaluated this patient today for ongoing rectal bleeding and anemia in the setting of prior prostate radiotherapy in 2023 with known radiation proctitis.  He has significant cardiovascular disease and is planned for CABG later this month and we are asked to consult on restratification in the setting of anticoagulation as well as options for further therapy.  So far, he has had endoscopic therapy with 2 sessions of argon plasma coagulation in January 2024 and April 2024 with some improvement in rectal bleeding.  He has also had iron infusions x 2.  The rectal bleeding symptoms currently are mild.       We discussed the nature of radiation proctitis at length today and risks associated with anticoagulation treatment in the setting of surgery.  With systemic anticoagulation, it is quite possible that  rectal bleeding could occur but it will likely be low-volume.  These bleeds do not tend to be life-threatening but can cause anemia with protracted bleeding over time.  Therefore, I believe that proceeding with a surgical intervention prior to definitive therapy for radiation proctitis is quite reasonable. Should he develop rectal bleeding after his CABG, then I would recommend initiation of sucralfate enemas (20 mL of a 10 percent sucralfate suspension in water twice daily ).  If this fails to still control rectal bleeding in the hospital setting, then inpatient GI consultation can be obtained for consideration of endoscopic therapy.     After definitive surgical therapy for cardiac issues, I would recommend further treatment for his radiation proctitis unless rectal bleeding ceases.  Given that he has had argon plasma coagulation x 2, I would recommend proceeding with radiofrequency ablation, tentatively planning for 2 sessions.  We will hold a spot for this in about 3 to 4 months so that we can proceed with this provided his surgery goes well.  It will be preferable to hold antiplatelet/anticoagulation medications prior to RFA though baby aspirin is acceptable.  Further endoscopic therapy to consider if needed could also include cryoablation     Patient was discussed and seen by Sean Rincon MD. The plan of care and pertinent data/imaging were also reviewed with GI Fellow as well. Time documented reflects my direct time and involvement in the care of this patient.  Agree with the joint assessment and plan as delineated above.     Please contact me with any further questions.     Sean Rincon MD  [facility]  Division of Gastroenterology  [phone]     A total of 70 minutes was spent with reviewing the chart, discussing with the patient, documentation and coordination of care on the day of this encounter.     Sean Rincon MD      Again, thank you for  allowing me to participate in the care of your patient.        Sincerely,        Sean Rincon MD

## 2024-07-09 NOTE — PATIENT INSTRUCTIONS
It was a pleasure meeting with you today and discussing your healthcare plan. Below is a summary of what we covered:    It was nice to talk with you today.  As we discussed, I think it is reasonable for you to proceed with surgery as planned.  If there are issues with rectal bleeding after your surgery, then I recommend some sucralfate enemas and I have listed how they can order these in my chart note.  They are typically done twice daily.  If rectal bleeding persists, then they can consult the inpatient gastroenterology team.  We will plan for outpatient flexible sigmoidoscopy with radiofrequency ablation to treat your proctitis.  You will need a full bowel prep for this procedure and will need to be done at the [facility].  We should tentatively plan on about 2 sessions for this.  They will reach out to you to schedule this in about 3 to 4 months.  Please let me know if you have any questions or concerns.    Please see below for any additional questions and scheduling guidelines.    Sign up for [employer]: [employer] patient portal serves as a secure platform for accessing your medical records from the [facility]. Additionally, [employer] facilitates easy, timely, and secure messaging with your care team. If you have not signed up, you may do so by using the provided code or calling [phone].    Coordinating your care after your visit:  There are multiple options for scheduling your follow-up care based on your provider's recommendation.    How do I schedule a follow-up clinic appointment:   After your appointment, you may receive scheduling assistance with the Clinic Coordinators by having a seat in the waiting room and a Clinic Coordinator will call you up to schedule.  Virtual visits or after you leave the clinic:  Your provider has placed a follow-up order in the [employer] portal for scheduling your return appointment. A member of the scheduling team will contact you to schedule.  [employer]  Scheduling: Timely scheduling through [employer] is advised to ensure appointment availability.   Call to schedule: You may schedule your follow-up appointment(s) by calling [phone], option 1.    How do I schedule my endoscopy or colonoscopy procedure:  If a procedure, such as a colonoscopy or upper endoscopy was ordered by your provider, the scheduling team will contact you to schedule this procedure. Or you may choose to call to schedule at   [phone], option 2.  Please allow 20-30 minutes when scheduling a procedure.    How do I get my blood work done? To get your blood work done, you need to schedule a lab appointment at an [facility]. There are multiple ways to schedule:   At the clinic: The Clinic Coordinator you meet after your visit can help you schedule a lab appointment.   [employer] scheduling: [employer] offers online lab scheduling at all [facility] laboratory locations.   Call to schedule: You can call [phone] to schedule your lab appointment.    How do I schedule my imaging study: To schedule imaging studies, such as CT scans, ultrasounds, MRIs, or X-rays, contact Imaging Services at [phone].    How do I schedule a referral to another doctor: If your provider recommended a referral to another specialist(s), the referral order was placed by your provider. You will receive a phone call to schedule this referral, or you may choose to call the number attached to the referral to self-schedule.    For Post-Visit Question(s):  For any inquiries following today's visit:  Please utilize [employer] messaging and allow 48 hours for reply or contact the Call Center during normal business hours at [phone], option 3.  For Emergent After-hours questions, contact the On-Call GI Fellow through the [facility] at [phone].  In addition, you may contact your Nurse directly using the provided contact information.    Test Results:  Test results will be accessible  via [employer] in compliance with the 21st Century Cures Act. This means that your results will be available to you at the same time as your provider. Often you may see your results before your provider does. Results are reviewed by staff within two weeks with communication follow-up. Results may be released in the patient portal prior to your care team review.    Prescription Refill(s):  Medication prescribed by your provider will be addressed during your visit. For future refills, please coordinate with your pharmacy. If you have not had a recent clinic visit or routine labs, for your safety, your provider may not be able to refill your prescription.

## 2024-07-10 LAB — FOLATE RBC-MCNC: 578 NG/ML

## 2024-07-11 ENCOUNTER — TELEPHONE (OUTPATIENT)
Dept: GASTROENTEROLOGY | Facility: CLINIC | Age: 74
End: 2024-07-11
Payer: COMMERCIAL

## 2024-07-11 NOTE — TELEPHONE ENCOUNTER
[facility] Call Center    Phone Message    May a detailed message be left on voicemail: yes     Reason for Call: Other: Pt & wife (Cassie) is wondering he needs his next iron infusion. Please call pt back.     Action Taken: Message routed to:  Adult Clinics: Gastroenterology (GI) p [id number]    Travel Screening: Not Applicable

## 2024-07-11 NOTE — TELEPHONE ENCOUNTER
I would recommend he do another series of iron infusions.  He can probably just have his primary care order Venofer locally if they are willing.     Spoke to Vincent and Cassie with above recommendations from Dr. Rincon. Vincent requests writer reach out to    Dr BaileyHematology/Oncology    [phone] [address]  - writer left a message for the nurse    Writer also left message for PCP Dr. Noah CLIFFORD.     Will await call back.

## 2024-07-15 ENCOUNTER — TELEPHONE (OUTPATIENT)
Dept: CARDIOLOGY | Facility: CLINIC | Age: 74
End: 2024-07-15
Payer: MEDICARE

## 2024-07-15 NOTE — TELEPHONE ENCOUNTER
[facility] Call Center    Phone Message    May a detailed message be left on voicemail: yes     Reason for Call: Other: Patient states he is returning a call to a nurse to go over information about his upcoming surgery 7/30/24. Please call the patient back . Thank you     Action Taken: cardiology    Travel Screening: Not Applicable  Thank you!  Specialty [facility] Center       Date of Service:

## 2024-07-16 ENCOUNTER — DOCUMENTATION ONLY (OUTPATIENT)
Dept: GASTROENTEROLOGY | Facility: CLINIC | Age: 74
End: 2024-07-16
Payer: MEDICARE

## 2024-07-16 ENCOUNTER — TELEPHONE (OUTPATIENT)
Dept: CARDIOLOGY | Facility: CLINIC | Age: 74
End: 2024-07-16
Payer: MEDICARE

## 2024-07-16 NOTE — TELEPHONE ENCOUNTER
Spoke to Vincent who asked writer to reach out to oncology provider Dr. Meza to order iron infusions, states has in the past and is aware but they were awaiting a call from GI clinic. States his PCP will not order the iron infusions.     Writer left detailed message for Dr. Meza's nurse with call back number. Will await call back.

## 2024-07-16 NOTE — TELEPHONE ENCOUNTER
Priya returned writers call, requested labs and office visit note.   States Vincent has received feraheme in the past and this is what will be ordered  unless there a reason he should need Venofer.     Routed to Dr. Rincon.     Message sent to [employer] support pool with fax request.

## 2024-07-16 NOTE — TELEPHONE ENCOUNTER
This writer spoke with patient's spouse and answered all the questions.     Orlando Altamirano, RNCC  Cardiothoracic Surgery   [facility]  O) [phone]  F) [phone]

## 2024-07-16 NOTE — PROGRESS NOTES
Per Pamela Aguayo RN's request, [facility] faxed the following records to [place] at 1 [phone].    Per nurse,   Pamela Aguayo RN  P [facility] Front End Staff_Candida Hunt,  Please fax to [phone] To [facility] [address]  - Labs ordered by Dr. Rincon on 7/9  - Office visit with Dr. Rincon on 7/9    Raquel Lara      The fax (20 pages) was confirmed successful on our end through [employer].      Karyn Guan [facility]

## 2024-07-17 NOTE — PROGRESS NOTES
Per Pamela Aguayo RN's request, [facility] faxed patient's records as requested on message below.    Per nurse,   Pamela Aguayo RN  P [facility] Front End Staff_Candida Hunt,  Please fax to Priya at 1 [phone]. Cancer [facility] [address]    Lab  -Iron and Iron binding 7/9 ordered by Lliliana Gruber.    Thanks,  Raquel    The fax was confirmed successful on our end through [employer].      Karyn Guan [facility]

## 2024-07-17 NOTE — TELEPHONE ENCOUNTER
Incoming Call from Priya CLIFFORD who asks for additional lab to be faxed Iron and Iron binding. Writer sent message to  support pool.

## 2024-07-19 ENCOUNTER — MEDICAL CORRESPONDENCE (OUTPATIENT)
Dept: HEALTH INFORMATION MANAGEMENT | Facility: CLINIC | Age: 74
End: 2024-07-19
Payer: MEDICARE

## 2024-07-20 NOTE — PROGRESS NOTES
HPI  Jorje Hutchinson is a 74 year old male who was referred to me for evaluation for CABG,  Patient is being evaluated for comorbid conditions of HTN, HLD, SVT, HERRERA, CKD, DM2, morbid obesity, OA.     Mr. Hutchinson has a history of CAD. He is s/p coronary angioplasty w/ stent placement of the circumflex coronary artery in 2002. He endorses significant GALLARDO. A cath on 5/22/24 demonstrated 70% occulsion of the proximal LAD, 60% distal LAD, 30% proximal LCx, 30% distal LCx, 90  % pRCA.         History is obtained from the patient and chart review. He is accompanied by his wife.          Past Medical History       Past Medical History:   Diagnosis Date    CAD (coronary artery disease)      HLD (hyperlipidemia)      HTN (hypertension)      Morbid obesity (H)      HERRERA (obstructive sleep apnea)      Osteoarthritis      Proctitis       radiation induced    Prostate cancer (H)      SVT (supraventricular tachycardia) (H24)              Past Surgical History        Past Surgical History:   Procedure Laterality Date    AS TOTAL KNEE ARTHROPLASTY Right      CARDIAC CATHERIZATION   2002    COLONOSCOPY   2006    CORONARY ANGIOPLASTY   2002     w/ stent    CV CORONARY ANGIOGRAM   05/22/2024    EXC SKIN BENIG >4CM REMAINDR BODY Right 1989     forearm & neck            Prior to Admission Medications         Current Outpatient Medications   Medication Sig Dispense Refill    allopurinol (ZYLOPRIM) 100 MG tablet Take 100 mg by mouth 2 times daily        aspirin 81 MG EC tablet Take 81 mg by mouth every morning        clopidogrel (PLAVIX) 75 MG tablet Take 75 mg by mouth every morning        diltiazem (CARDIZEM) 30 MG tablet Take 30 mg by mouth as needed        diltiazem ER COATED BEADS (CARDIZEM CD/CARTIA XT) 120 MG 24 hr capsule Take 120 mg by mouth every morning        empagliflozin (JARDIANCE) 10 MG TABS tablet Take 10 mg by mouth every morning        exenatide ER (BYDUREON BCISE) 2 MG/0.85ML auto-injector Inject 2 mg Subcutaneous  every 7 days Every Sunday        insulin glargine (LANTUS SOLOSTAR) 100 UNIT/ML pen Inject 38 Units Subcutaneous every morning        magnesium chloride 535 (64 Mg) MG TBEC CR tablet Take 535 mg by mouth 2 times daily        oxyBUTYnin ER (DITROPAN XL) 10 MG 24 hr tablet Take 10 mg by mouth every morning        Psyllium-Calcium (METAMUCIL PLUS CALCIUM) CAPS Take by mouth every morning        rosuvastatin (CRESTOR) 20 MG tablet Take 20 mg by mouth at bedtime        sotalol (BETAPACE) 80 MG tablet Take 80 mg by mouth every morning        spironolactone (ALDACTONE) 25 MG tablet Take 25 mg by mouth at bedtime        tamsulosin (FLOMAX) 0.4 MG capsule Take 0.4 mg by mouth at bedtime        torsemide (DEMADEX) 20 MG tablet Take 20 mg by mouth every morning        triamcinolone (KENALOG) 0.1 % external cream Apply 1 Application topically as needed        vitamin D3 (CHOLECALCIFEROL) 10 MCG (400 UNIT) capsule Take 1 capsule by mouth every morning                Allergies        Allergies   Allergen Reactions    Shellfish-Derived Products Anaphylaxis, Difficulty breathing and Photosensitivity    Shrimp         Other Reaction(s): Swelling of eye, Swelling of throat    Latex Blisters, Dermatitis, Hives, Itching and Rash            Social History  Social History            Socioeconomic History    Marital status:        Spouse name: Not on file    Number of children: Not on file    Years of education: Not on file    Highest education level: Not on file   Occupational History    Not on file   Tobacco Use    Smoking status: Never    Smokeless tobacco: Never    Tobacco comments:       Occasional cigar 20+ years ago   Substance and Sexual Activity    Alcohol use: Yes       Comment: 1 per month    Drug use: Not Currently    Sexual activity: Not on file   Other Topics Concern    Not on file   Social History Narrative    Not on file      Social Determinants of Health           Financial Resource Strain: Low Risk  (7/8/2023)      "Received from [facility]     Overall Financial Resource Strain (CARDIA)      Difficulty of Paying Living Expenses: Not hard at all   Food Insecurity: No Food Insecurity (7/8/2023)     Received from [facility]     Hunger Vital Sign      Worried About Running Out of Food in the Last Year: Never true      Ran Out of Food in the Last Year: Never true   Transportation Needs: No Transportation Needs (7/8/2023)     Received from [facility]     PRAPARE - Transportation      Lack of Transportation (Medical): No      Lack of Transportation (Non-Medical): No   Physical Activity: Inactive (7/8/2023)     Received from [facility]     Exercise Vital Sign      Days of Exercise per Week: 0 days      Minutes of Exercise per Session: 0 min   Stress: Not on file   Social Connections: Not on file   Interpersonal Safety: Unknown (7/8/2023)     Received from [facility]     Humiliation, Afraid, Rape, and Kick questionnaire      Fear of Current or Ex-Partner: No      Emotionally Abused: Not on file      Physically Abused: No      Sexually Abused: No   Housing Stability: Low Risk  (7/8/2023)     Received from [facility]     Housing Stability      What is your living situation today?: I have a steady place to live            Family History  Family History[]Expand by Default         Family History   Problem Relation Age of Onset    Anesthesia Reaction No family hx of      Deep Vein Thrombosis (DVT) No family hx of              Review of Systems  The complete review of systems is negative other than noted in the HPI or here.  /68 (BP Location: Right arm, Patient Position: Sitting, Cuff Size: Adult Large)   Pulse 67   Temp 97.9  F (36.6  C) (Oral)   Resp 16   Ht 1.727 m (5' 8\")   Wt (!) 150.9 kg (332 lb 9.6 oz)   SpO2 99%   BMI 50.57 kg/m       Physical Exam  Constitutional: Awake, alert, cooperative, no apparent distress, and appears stated age.  Eyes: Pupils equal, round and reactive to light, extra ocular muscles intact, " sclera clear, conjunctiva normal.  HENT: Normocephalic, oral pharynx with moist mucus membranes, good dentition. No goiter appreciated. No removable dental hardware.  Respiratory: Clear to auscultation bilaterally, no crackles or wheezing. No SOB when supine.  Cardiovascular: Distant heart sounds, Regular rate and rhythm, normal S1 and S2, and no murmur noted.  Carotids +1, no bruits. No edema. Palpable pulses to radial & DP arteries. 1+ edema.  GI: Normal bowel sounds, soft, obese, non-tender, no masses palpated.  Exam limited due to body habitus.  Skin: Warm and dry.  No rashes.   Musculoskeletal: Mildly limited extension ROM of neck. There is no redness, warmth, or swelling of the joints. Gross motor strength is normal.    Neurologic: no focal deficits.           PRIOR LABS/DIAGNOSTIC STUDIES:     All labs and imaging personally reviewed          Heart cath 5/22/24          Isamar stress test 2/22/24       Echo  2/22/24       LABS 6/3/24             The patient's records and results personally reviewed by me.     Outside records reviewed from:  [facility]     LAB/DIAGNOSTIC STUDIES TODAY:  CMP, CBC, A1c, T&S, prealb, INR, PTT, UA, US carotid, EKG     Assessment and Plan     Jorje Hutchinson is a 74 year old male with severe coronary artery disease. I agree with the recommendation of his cardiologist Dr Badillo to consider him for CABG. I discussed the risks and benefits of surgery with patient and family including the risks of death, bleeding, stroke, infection, renal failure and arrhythmias. I also reviewed the angiogram with Dr Badillo, who agrees that there is disease in distal OM/KRISTYN branch and possible LPDA. We wll also place a clip in the left atrial appendage to reduce the need for anticoagulation.  Of concern is the bleeding that he has as a result of radiation proctitis and what effect anticoagulation with heparin at the time of CABG may have on this. We will have him review with the  GI service to get their input in this issue.

## 2024-07-23 ENCOUNTER — TELEPHONE (OUTPATIENT)
Dept: CARDIOLOGY | Facility: CLINIC | Age: 74
End: 2024-07-23
Payer: COMMERCIAL

## 2024-07-23 DIAGNOSIS — I25.10 CORONARY ARTERY DISEASE INVOLVING NATIVE CORONARY ARTERY OF NATIVE HEART, UNSPECIFIED WHETHER ANGINA PRESENT: Primary | ICD-10-CM

## 2024-07-23 NOTE — TELEPHONE ENCOUNTER
Patient called about getting a blood transfusion last Friday. Will get ABO and HBG rechecked on 7/29 prior to surgery

## 2024-07-23 NOTE — PHARMACY-ADMISSION MEDICATION HISTORY
Pharmacy Intern Pre-operative Admission Medication History    Admission medication history was completed on July 23, 2024 in anticipation for upcoming surgical admission currently scheduled for 7/30. The information provided in this note is only as accurate as the sources available at the time of the update.  Pre-operative nursing staff should still review this list with patient for any changes or updates.     Information Source(s): Patient and CareEverywhere/SureScripts via phone    Pertinent Information:   Per pt was able to verify the list of medication  rosuvastatin (CRESTOR) 20 MG tablet  Per dispensing report, last dispensed date 2/16. Verified with pharmacy (#[phone]).  Per patient, he stopped taking 2 months ago due to instruction from Dr. Zamora. Reason to stop taking is pt's BP goes too low.  The hospital denied to give confirmation via phone  Hospital Info: [facility] (#[phone])    Changes made to PTA medication list:  Added: None  Deleted: None  Changed: None    Allergies reviewed with patient and updates made in EHR: yes    Medication History Completed By: Alan Tanner 7/23/2024 12:39 PM    PTA Med List   Medication Sig Last Dose    allopurinol (ZYLOPRIM) 100 MG tablet Take 100 mg by mouth 2 times daily     aspirin 81 MG EC tablet Take 81 mg by mouth every morning     clopidogrel (PLAVIX) 75 MG tablet Take 75 mg by mouth every morning     diltiazem (CARDIZEM) 30 MG tablet Take 30 mg by mouth as needed     diltiazem ER COATED BEADS (CARDIZEM CD/CARTIA XT) 120 MG 24 hr capsule Take 120 mg by mouth every morning     empagliflozin (JARDIANCE) 10 MG TABS tablet Take 10 mg by mouth every morning     exenatide ER (BYDUREON BCISE) 2 MG/0.85ML auto-injector Inject 2 mg Subcutaneous every 7 days Every Sunday 7/21/2024    insulin glargine (LANTUS SOLOSTAR) 100 UNIT/ML pen Inject 38 Units Subcutaneous every morning     losartan (COZAAR) 25 MG tablet Take 25 mg by mouth at bedtime More than a  month    magnesium chloride 535 (64 Mg) MG TBEC CR tablet Take 535 mg by mouth 2 times daily     oxyBUTYnin ER (DITROPAN XL) 10 MG 24 hr tablet Take 10 mg by mouth every morning     sotalol (BETAPACE) 80 MG tablet Take 80 mg by mouth every morning     spironolactone (ALDACTONE) 25 MG tablet Take 25 mg by mouth every morning     tamsulosin (FLOMAX) 0.4 MG capsule Take 0.4 mg by mouth at bedtime     torsemide (DEMADEX) 20 MG tablet Take 20 mg by mouth every morning     triamcinolone (KENALOG) 0.1 % external cream Apply 1 Application topically as needed     vitamin D3 (CHOLECALCIFEROL) 10 MCG (400 UNIT) capsule Take 1 capsule by mouth every morning

## 2024-07-24 RX ORDER — LOSARTAN POTASSIUM 25 MG/1
25 TABLET ORAL AT BEDTIME
Status: ON HOLD | COMMUNITY
End: 2024-08-10

## 2024-07-26 ENCOUNTER — TRANSFERRED RECORDS (OUTPATIENT)
Dept: HEALTH INFORMATION MANAGEMENT | Facility: CLINIC | Age: 74
End: 2024-07-26
Payer: MEDICARE

## 2024-07-28 ENCOUNTER — HEALTH MAINTENANCE LETTER (OUTPATIENT)
Age: 74
End: 2024-07-28

## 2024-07-28 LAB
ABO/RH(D): NORMAL
ANTIBODY SCREEN: NEGATIVE
SPECIMEN EXPIRATION DATE: NORMAL

## 2024-07-29 ENCOUNTER — ANESTHESIA EVENT (OUTPATIENT)
Dept: SURGERY | Facility: CLINIC | Age: 74
DRG: 233 | End: 2024-07-29
Payer: MEDICARE

## 2024-07-29 ENCOUNTER — TELEPHONE (OUTPATIENT)
Dept: CARDIOLOGY | Facility: CLINIC | Age: 74
End: 2024-07-29

## 2024-07-29 ENCOUNTER — LAB (OUTPATIENT)
Dept: LAB | Facility: CLINIC | Age: 74
End: 2024-07-29
Payer: MEDICARE

## 2024-07-29 DIAGNOSIS — I25.10 CORONARY ARTERY DISEASE INVOLVING NATIVE CORONARY ARTERY OF NATIVE HEART, UNSPECIFIED WHETHER ANGINA PRESENT: ICD-10-CM

## 2024-07-29 LAB
ERYTHROCYTE [DISTWIDTH] IN BLOOD BY AUTOMATED COUNT: 18.7 % (ref 10–15)
HCT VFR BLD AUTO: 28.2 % (ref 40–53)
HGB BLD-MCNC: 9.1 G/DL (ref 13.3–17.7)
MCH RBC QN AUTO: 28.8 PG (ref 26.5–33)
MCHC RBC AUTO-ENTMCNC: 32.3 G/DL (ref 31.5–36.5)
MCV RBC AUTO: 89 FL (ref 78–100)
PLATELET # BLD AUTO: 168 10E3/UL (ref 150–450)
RBC # BLD AUTO: 3.16 10E6/UL (ref 4.4–5.9)
WBC # BLD AUTO: 7 10E3/UL (ref 4–11)

## 2024-07-29 PROCEDURE — 36415 COLL VENOUS BLD VENIPUNCTURE: CPT | Performed by: PATHOLOGY

## 2024-07-29 PROCEDURE — 86900 BLOOD TYPING SEROLOGIC ABO: CPT | Performed by: SURGERY

## 2024-07-29 PROCEDURE — 85027 COMPLETE CBC AUTOMATED: CPT | Performed by: PATHOLOGY

## 2024-07-29 ASSESSMENT — ENCOUNTER SYMPTOMS
SEIZURES: 0
ORTHOPNEA: 1

## 2024-07-29 ASSESSMENT — LIFESTYLE VARIABLES: TOBACCO_USE: 0

## 2024-07-29 NOTE — H&P
CV ICU H&P  7/30/2024      CO-MORBIDITIES:   Patient Active Problem List   Diagnosis    Diabetes mellitus, type 2 (H)    Coronary artery disease involving native coronary artery of native heart, unspecified whether angina present       ASSESSMENT: Jorje Hutchinson is a 74 year old male with PMH of CAD sp coronary angioplasty w/stent placement of circumflex coronary artery in 2002, HTN, HLD, SVT, HERRERA, CKD, T2DM, OA, prostate cancer, and worsening morbid obesity. He presents to [facility] for CORONARY ARTERY BYPASS GRAFT x2 (LIMA-LAD, SVG-LPL), Pulmonary vein isolation with RF ablation, LIGATION, LEFT ATRIAL APPENDAGE, sternal closure with plates and wires.     Signout  Arrives from the CVICU intubated and sedated on propofol gtt at 50 mcg/kg/min.  SP CABGx2 (LIMA-LAD, SVG-LPL), 3 CT placed (2 mediastinal, 1 left pleural), V wires placed, remains ventricular paced at 80 BPM. Bypass time 100 minutes,  mL w/o shock after removing cross clamp.  Required 2 u pRBCs, 1U plts, 150ml cell saver, approximately 3900 L crystalloid. Access is RIJ with slick no swan, L rad A line. Reversed. Pt is fast-trackable per surgical team and anesthesia team.    Totals:  Analgesics   -Fentanyl: 750 mcg   -Hydromorphone: 1.5 mg  -On 0 epi gtt, 0.03 norepi gtt for pressor and inotropic support.   Insulin gtt 4U/hr  Calcium chloride 1300 mg  Amicar gtt 1.25g/hr  LR gtt 100 ml/hr ~ 760 mL total  Plasmalyte 3100 mL total  UOP: 825 mL  EBL: 1000 mL    Pre procedure Echo: LVEF 62% Grossly normal LV size with preserved systolic function and EF 62% by biplane assessment. Normal LV diastolic function by E/A 1.4 and lateral e' 10.8 cm/s. Grossly normal RV size with no evidence of systolic dysfunction by TAPSE 25 mm. STACY velocities > 4 cm/s with no visualization of SEC or thrombus via 3D assessment. No PFO by CFD. Trace MR. Trileaflet aortic valve without significant regurgitation or stenosis. No pleural or pericardial effusion. No echo evidence of  aortic dissection.     Post procedure Echo: Globally unchanged biventricular size and function. No new RWMA. MR trace-mild by visual appearance and 2D PISA EROA 0.06 cm^2. No significant pleural effusion. No echo evidence of aortic dissection.       PLAN:  Neuro/ pain/ sedation:  #Acute Postoperative pain  - Monitor neurological status. Notify the MD for any acute changes in exam.  - Pain: Scheduled tylenol. PRN tylenol, oxycodone, dilaudid.  - Sedation: propofol gtt. Wean during PST    Pulmonary care:   #Obstructive Sleep Apnea, CPAP  #Postoperative ventilation management    - Intubated, ventilated with vent settings of  mL, RR 14, FiO2 40%, PEEP 10  - Lung-protective ventilatory strategy w/ SBT TID with ABG afterwards with PEEP and FiO2 weaning. Plan to extubate overnight to HFNC if he tolerates PST for approximately 30 minutes with PF ratios wnl on minimal vent settings (likely PST on 8/8 given pt habitus)  - Titrate supplemental oxygen to maintain saturation above 92%.  - Pulmonary hygiene: Incentive spirometer every 15- 30 minutes when awake, flutter valve, C&DB    Cardiovascular:    #S/p CABG x x2 (LIMA-LAD, SVG-LPL), Pulmonary vein isolation with RF ablation, LIGATION, LEFT ATRIAL APPENDAGE, sternal closure with plates and wires on 7/30/24 by Dr. Wilson  #History of CAD sp coronary angioplasty w/stent of circumflex coronary a in 2002  #HTN  #HLD  #Supraventricular tachycardia  #Post-op vasoplegia  #Hx of a fib  Recent echo on 6/20/24 with LVEF of 60-65%  - Monitor hemodynamic status.   - Goal MAP>65, SBP<130  - Statin hold  - BB hold  - ASA 81mg: start tomorrow on POD1  - Epi, norepi, vaso gtt; wean as tolerated  -PTA meds: asa, clopidogrel 75 mg am, diltiazem 30 mg prn , diltiazem ER 120mg every day, losartan 25 mg at bedtime, sotalol 80 mg qd, spironolactone 25 mg every day, torsemide 20 mg at bedtime to be held    GI care/ Nutrition:   #Concern for protein-calorie malnutrition  - NPO POD0  - PPI  -  Continue bowel regimen: miralax, senna  -ADAT to combo of moderate consistent CHO diet and low saturated fat sodium less than 2400 mg after extubation      Renal/ Fluid Balance/ Electrolytes:   #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 Lactate q2h until downtrend  -Daily BMP, hepatic panel, phos, mag, lactate until downtrend  -Trend chest tube output   -If >400ml/hr in first hour post-op or >200 ml/hr during the first two hours, please page CVICU team.  -Clopidogrel w/ continued bleeding of prostate 2/2 prior radiation therapy/proctitis. HELD    Endocrine:    Stress induced hyperglycemia  T2DM, insulin dependent  Preop A1c 6.7% on 7/1/24  - Insulin gtt  - Goal BG <180 for optimal healing  - PTA meds: Empagliflozin 10 mg every day, insulin glargine 38u subcutaneous AM to be held POD0    ID/ Antibiotics:  #Stress induced leukocytosis  - To complete perioperative regimen  - Continue to monitor fever curve, WBC and inflammatory markers as appropriate  -Ancef 2g q8h for 3 total doses    Heme:     #Stress induced leukocytosis  #Acute blood loss anemia  #Acute blood loss thrombocytopenia  No s/sx active bleeding  - Continue to monitor  - POD0 labs: Coag panel q6h, CBC q6h  -Daily CBC, Coag panel  -Heparin subQ 5000 TID to be started on POD1    MSK/ Skin:  #Sternotomy  #Surgical Incision  #Chronic left knee pain  - Sternal precautions  - Postoperative incision management per protocol  - PT/OT/CR  -PTA allopurinol 100 mg bid held    Prophylaxis:    - Mechanical prophylaxis for DVT  - Chemical DVT prophylaxis - start subcutaneous heparin tomorrow on POD1  - PPI    Lines/ tubes/ drains:  - ETT  - RIJ CVC with introducer  - Arterial Line  - CTs x3  -  Kathi    Disposition:  - CVICU    ICU Checklist  F - Feeding:   A - Analgesia:   S - Sedation:   T - Thromboembolic prophylaxis:      H - Head of bed elevated  U - Ulcer prophylaxis:  G - Glycemic control  S - SBT     B - Bowel regimen  I - Indwelling catheter  D - De-escalation of antibiotics    Patient seen, findings and plan discussed with CVICU staff.    Tony Lakhani M.D.   Department of Anesthesiology, CA-2/PGY-3  July 30, 2024      ====================================    HPI:   Jorje is a 74 year old male with a pMHx of CAD sp coronary angioplasty w/stent placement of circumflex coronary artery in 2002, HTN, HLD, SVT, HERRERA, CKD, T2DM, OA, prostate cancer, and worsening morbid obesity. Recent coronary artery catheterization on 5/22/24 findings were pertinent for 70% occlusion of proximal LAD, 60% distal LAD, 30% proximal Lcx, 30% distal Lcx, and 90% pRCA. He presents to [facility] for CABG, Chaidez Maze procedure, and ligation of left atrial appendage with Dr. Wilson on 7/30/24.    PAST MEDICAL HISTORY:   Past Medical History:   Diagnosis Date    CAD (coronary artery disease)     HLD (hyperlipidemia)     HTN (hypertension)     Morbid obesity (H)     HERRERA (obstructive sleep apnea)     Osteoarthritis     Proctitis     radiation induced    Prostate cancer (H)     SVT (supraventricular tachycardia) (H24)        PAST SURGICAL HISTORY:   Past Surgical History:   Procedure Laterality Date    AS TOTAL KNEE ARTHROPLASTY Right     CARDIAC CATHERIZATION  2002    COLONOSCOPY  2006    CORONARY ANGIOPLASTY  2002    w/ stent    CV CORONARY ANGIOGRAM  05/22/2024    EXC SKIN BENIG >4CM REMAINDR BODY Right 1989    forearm & neck       FAMILY HISTORY:   Family History   Problem Relation Age of Onset    Anesthesia Reaction No family hx of     Deep Vein Thrombosis (DVT) No family hx of        SOCIAL HISTORY:   Social History     Tobacco Use    Smoking status: Never    Smokeless tobacco: Never    Tobacco comments:     Occasional cigar 20+  years ago   Substance Use Topics    Alcohol use: Yes     Comment: 1 per month         OBJECTIVE:   1. VITAL SIGNS:   Temp:  [36.7  C (98.1  F)] 36.7  C (98.1  F)  Resp:  [16] 16  BP: (126)/(57) 126/57  FiO2 (%):  [40 %] 40 %    FiO2 (%): 40 %  Resp: 16        2. INTAKE/ OUTPUT:   I/O last 3 completed shifts:  In: 1851 [I.V.:1100; Other:150]  Out: 700 [Urine:700]       3. PHYSICAL EXAMINATION:   General: sedated on 50mcg/kg/hour. NAD  Neuro: sedated, PERRL  Resp: intubated, ventilated. Vent settings: AC mode, FiO2 wean as tolerated, rate 14, peep 10,   CV: Paced rate at 80 bpm, Ventricular wires present  Abdomen: Soft, non-distended, non-tender.   Incisions: Dressings c/d/I.   Extremities: warm and well perfused, 2+ DP and radial pulses  CT: 3x to suction, serosang output, no airleak, crepitus      4. INVESTIGATIONS:   Arterial Blood Gases   Recent Labs   Lab 07/30/24  1550 07/30/24  1449 07/30/24  1422 07/30/24  1354   PH 7.40 7.41 7.38 7.38   PCO2 37 38 41 44   PO2 180* 164* 239* 335*   HCO3 23 24 25 26     Complete Blood Count   Recent Labs   Lab 07/30/24  1550 07/30/24  1449 07/30/24  1433 07/30/24  1422 07/30/24  1354 07/30/24  0916 07/29/24  1429   WBC  --   --   --   --   --   --  7.0   HGB 8.1* 7.9*  --  7.5* 6.7*   < > 9.1*   PLT  --   --  99*  --   --   --  168    < > = values in this interval not displayed.     Basic Metabolic Panel  Recent Labs   Lab 07/30/24  1550 07/30/24  1449 07/30/24  1422 07/30/24  1354    139 139 140   POTASSIUM 4.2 4.2 4.0 4.3   * 151* 162* 167*     Liver Function Tests  Recent Labs   Lab 07/30/24  1433   INR 1.49*     Pancreatic Enzymes  No lab results found in last 7 days.  Coagulation Profile  Recent Labs   Lab 07/30/24  1433   INR 1.49*   PTT 29         5. RADIOLOGY:   Recent Results (from the past 24 hour(s))   RAPHAEL with Report    Narrative    Tyrone Barrientos DO     7/30/2024  3:17 PM  Perioperative RAPHAEL Procedure Note    Staff -        Resident/Fellow: King  Tyrone DUNLAP DO       Performed By: fellow  Preanesthesia Checklist:  Patient identified, IV assessed, risks and   benefits discussed, monitors and equipment assessed, procedure being   performed at surgeon's request and anesthesia consent obtained.    RAPHAEL Probe Insertion    Probe Status PRE Insertion: NO obvious damage  Probe type:  Adult 3D  Bite block used:   Oral Airway  Insertion Technique: Jaw Lift  Insertion complications: None obvious  Billing Report:RAPHAEL report by Anesthesiologist (See Separate Report note)  Probe Status POST Removal: NO obvious damage    RAPHAEL Report  General Procedure Information  Images for this study have been archived.  Modalities: Color flow mapping, PW Doppler, CW Doppler, 3D and 2D  Diagnostic indications for RAPHAEL:   Cardiomyopathy, other  Echocardiographic and Doppler Measurements  Right Ventricle:  Cavity size normal.    Hypertrophy not present.     Thrombus not present.    Global function normal.     Left Ventricle:  Cavity size normal.    Hypertrophy not present.     Thrombus not present.   Global Function normal.       Ventricular Regional Function:  1- Basal Anteroseptal:  normal  2- Basal Anterior:  normal  3- Basal Anterolateral:  normal  4- Basal Inferolateral:  normal  5- Basal Inferior:  normal  6- Basal Inferoseptal:  normal  7- Mid Anteroseptal:  normal  8- Mid Anterior:  normal  9- Mid Anterolateral:  normal  10- Mid Inferolateral:  normal  11- Mid Inferior:  normal  12- Mid Inferoseptal:  normal  13- Apical Anterior:  normal  14- Apical Lateral:  normal  15- Apical Inferior:  normal  16- Apical Septal:  normal  17- [place]:  normal    Valves  Aortic Valve: Annulus normal.  Stenosis not present.  Regurgitation   absent.  Leaflets normal.  Leaflet motions normal.    Mitral Valve: Annulus normal.  Stenosis not present.  Regurgitation   absent.  Leaflets normal.  Leaflet motions normal.    Tricuspid Valve: Annulus normal.  Stenosis not present.  Regurgitation   absent.  Leaflets  normal.  Leaflet motions normal.        Aorta: Ascending Aorta: Size normal.  Dissection not present.  Plaque   thickness less than 3 mm.  Mobile plaque not present.    Aortic Arch: Size normal.    Dissection not present.   Plaque thickness   less than 3 mm.   Mobile plaque not present.    Descending Aorta: Size normal.    Dissection not present.   Plaque   thickness less than 3 mm.   Mobile plaque not present.      Right Atrium:  Size normal.   Spontaneous echo contrast not present.     Thrombus not present.   Tumor not present.   Device not present.     Left Atrium: Size normal.  Spontaneous echo contrast not present.    Thrombus not present.  Tumor not present.  Device not present.    Left atrial appendage normal.     Atrial Septum: Intra-atrial septal morphology normal.       Ventricular Septum: Intra-ventricular septum morphology normal.        Other Findings:   Pericardium:  normal. Pleural Effusion:  none. Pulmonary Arteries:    normal. Pulmonary Venous Flow:  normal. Cornoary sinus catheter present.    Post Intervention Findings  Procedure(s) performed:  CABG. Global function:  Unchanged. Regional wall   motion: Unchanged. Surgeon(s) notified of all postintervention findings:   Yes.                 Echocardiogram Comments  Echocardiogram comments:   Pre-CPB:  Grossly normal LV size with preserved systolic function and EF 62% by   biplane assessment. Normal LV diastolic function by E/A 1.4 and lateral e'   10.8 cm/s. Grossly normal RV size with no evidence of systolic dysfunction   by TAPSE 25 mm. STACY velocities > 4 cm/s with no visualization of SEC or   thrombus via 3D assessment. No PFO by CFD. Trace MR. Trileaflet aortic   valve without significant regurgitation or stenosis. No pleural or   pericardial effusion. No echo evidence of aortic dissection. Findings   communicated with surgical team in real time.    Post-CPB:  Globally unchanged biventricular size and function. No new RWMA. MR   trace-mild by  visual appearance and 2D PISA EROA 0.06 cm^2. No significant   pleural effusion. No echo evidence of aortic dissection. Findings   communicated with surgical team in real time..       =========================================

## 2024-07-29 NOTE — ANESTHESIA PREPROCEDURE EVALUATION
Anesthesia Pre-Procedure Evaluation    Patient: Jorje Hutchinson   MRN: [id number] : 1950        Procedure : Procedure(s):  CORONARY ARTERY BYPASS GRAFT  WINTER MAZE PROCEDURE  LIGATION, LEFT ATRIAL APPENDAGE, OPEN AND ALL ASSOCIATED PROCEDURES **LATEX ALLERGY**          Past Medical History:   Diagnosis Date    CAD (coronary artery disease)     HLD (hyperlipidemia)     HTN (hypertension)     Morbid obesity (H)     HERRERA (obstructive sleep apnea)     Osteoarthritis     Proctitis     radiation induced    Prostate cancer (H)     SVT (supraventricular tachycardia) (H24)       Past Surgical History:   Procedure Laterality Date    AS TOTAL KNEE ARTHROPLASTY Right     CARDIAC CATHERIZATION      COLONOSCOPY      CORONARY ANGIOPLASTY      w/ stent    CV CORONARY ANGIOGRAM  2024    EXC SKIN BENIG >4CM REMAINDR BODY Right     forearm & neck      Allergies   Allergen Reactions    Shellfish-Derived Products Anaphylaxis, Difficulty breathing and Photosensitivity    Shrimp      Other Reaction(s): Swelling of eye, Swelling of throat    Latex Blisters, Dermatitis, Hives, Itching and Rash      Social History     Tobacco Use    Smoking status: Never    Smokeless tobacco: Never    Tobacco comments:     Occasional cigar 20+ years ago   Substance Use Topics    Alcohol use: Yes     Comment: 1 per month      Wt Readings from Last 1 Encounters:   24 (!) 151.2 kg (333 lb 4.8 oz)        Anesthesia Evaluation   Pt has had prior anesthetic.     No history of anesthetic complications       ROS/MED HX  ENT/Pulmonary:     (+) sleep apnea, uses CPAP,                                   (-) tobacco use, asthma and recent URI   Neurologic:  - neg neurologic ROS  (-) no seizures and no CVA   Cardiovascular:     (+)  hypertension- -  CAD -  - -   Taking blood thinners Pt has received instructions:       GALLARDO. orthopnea/PND,            dysrhythmias (paroxysmal afib),         Previous cardiac testing   Echo: Date:  Results:  Normal valvular function and ejection fractin  Stress Test:  Date: Results:  Positive for inducible ischemia  ECG Reviewed:  Date: Results:    Cath:  Date: Results:  70% occulsion of the proximal LAD, 60% distal LAD, 30% proximal LCx, 30% distal LCx, 90% pRCA.      METS/Exercise Tolerance: 1 - Eating, dressing Comment: Unable to walk one block due to GALLARDO   Hematologic:  - neg hematologic  ROS  (-) history of blood clots and history of blood transfusion   Musculoskeletal: Comment: S/p right TKA  Chronic left knee pain        GI/Hepatic:    (-) GERD and liver disease   Renal/Genitourinary: Comment: Creatinine  1.42 on 6/3/24 in records from [place]    Radiation proctitis with rectal bleeding, passes clots every 3 days per wife. Cleared by GI to undergo surgery with low bleeding risk intraop    (+) renal disease, type: CRI, Pt does not require dialysis,           Endo:     (+)  type II DM,             Obesity,       Psychiatric/Substance Use:  - neg psychiatric ROS     Infectious Disease:  - neg infectious disease ROS     Malignancy:   (+) Malignancy (July 2023), History of Prostate.Prostate CA status post Radiation.      Other:  - neg other ROS          Physical Exam    Airway        Mallampati: IV   TM distance: > 3 FB    Mouth opening: > 3 cm    Respiratory Devices and Support         Dental     Comment: Patient reports no loose or chipped teeth        Cardiovascular          Rhythm and rate: regular and normal     Pulmonary           breath sounds clear to auscultation           OUTSIDE LABS:  CBC:   Lab Results   Component Value Date    WBC 5.8 07/09/2024    WBC 6.4 07/01/2024    HGB 8.9 (L) 07/09/2024    HGB 9.5 (L) 07/01/2024    HCT 27.6 (L) 07/09/2024    HCT 27.6 (L) 07/09/2024     07/09/2024     07/01/2024     BMP:   Lab Results   Component Value Date     07/01/2024    POTASSIUM 4.3 07/01/2024    CHLORIDE 101 07/01/2024    CO2 21 (L) 07/01/2024    BUN 32.9 (H) 07/01/2024    CR 1.37  "(H) 07/01/2024     (H) 07/01/2024     COAGS:   Lab Results   Component Value Date    PTT 27 07/01/2024    INR 1.18 (H) 07/01/2024     POC: No results found for: \"BGM\", \"HCG\", \"HCGS\"  HEPATIC:   Lab Results   Component Value Date    ALBUMIN 3.9 07/01/2024    PROTTOTAL 6.2 (L) 07/01/2024    ALT 7 07/01/2024    AST 28 07/01/2024    ALKPHOS 110 07/01/2024    BILITOTAL 0.3 07/01/2024     OTHER:   Lab Results   Component Value Date    A1C 6.7 (H) 07/01/2024    VISH 9.4 07/01/2024       Anesthesia Plan    ASA Status:  4    NPO Status:  NPO Appropriate    Anesthesia Type: General.     - Airway: ETT   Induction: Intravenous, Propofol.   Maintenance: Balanced.   Techniques and Equipment:     - Airway: Video-Laryngoscope     - Lines/Monitors: 2nd IV, BIS, Arterial Line, Central Line, PAC, CVP, NIRS, RAPHAEL            RAPHAEL Absolute Contra-indication: NONE            RAPHAEL Relative Contra-indication: NONE     - Blood: Cell Saver, T&C     - Drips/Meds: Norepi, Vasopressin, Epinephrine, Fentanyl     Consents    Anesthesia Plan(s) and associated risks, benefits, and realistic alternatives discussed. Questions answered and patient/representative(s) expressed understanding.     - Discussed: Risks, Benefits and Alternatives for BOTH SEDATION and the PROCEDURE were discussed     - Discussed with:  Patient      - Extended Intubation/Ventilatory Support Discussed: Yes.      - Patient is DNR/DNI Status: No     Use of blood products discussed: Yes.     - Discussed with: Patient.     - Consented: consented to blood products     Postoperative Care    Pain management: IV analgesics, Oral pain medications, Multi-modal analgesia.        Comments:    Other Comments: Discussed significant bleeding risk during surgery due to his radiation proctitis bleeding and likely need for blood transfusion    Dental documentation is based on patient self-reporting for any loose or chipped teeth. Any obvious visual dental abnormalities seen in the airway exam " "is also documented.    Risks of anesthesia was discussed with the patient, that include risk of sore throat and hoarse voice that should be temporary on the order of days, risk of oral mucosa injury (eg. lip and tongue) , and a very rare risk of dental injury requiring repair.    Additional risks of anesthesia was discussed with the patient, including heart attack, stroke, blood clots, respiratory issues, and cardiac arrest.    Risk of invasive lines were discussed, including, bleeding, bruising, soft tissue injury including vascular and nerve, and thrombus. I additionally discussed that the central line is associated with a small risk of pneumothorax.     Risks of positional injury was discussed, including nerve compression and stretching leading to numbness, paresthesias, and even motor weakness that is rarely long-term and that effort would be taken to minimize this from occurring.     Risk of transesophageal echocardiogram was discussed involving esophageal perforation that is very rare that may require a procedure/surgery for management    Patient was given opportunity to ask questions and express concerns, which were then all addressed.             Jaren De Leon MD    I have reviewed the pertinent notes and labs in the chart from the past 30 days and (re)examined the patient.  Any updates or changes from those notes are reflected in this note.            # Coagulation Defect: INR = 1.18 (Ref range: 0.85 - 1.15) and/or PTT = 27 Seconds (Ref range: 22 - 38 Seconds), will monitor for bleeding   # DMII: A1C = 6.7 % (Ref range: <5.7 %) within past 6 months  # Severe Obesity: Estimated body mass index is 50.68 kg/m  as calculated from the following:    Height as of 7/9/24: 1.727 m (5' 8\").    Weight as of 7/9/24: 151.2 kg (333 lb 4.8 oz).      "

## 2024-07-29 NOTE — TELEPHONE ENCOUNTER
Requested from [facility]  Iron infusion and blood transfusion on 7/19   Iron infusion on 7/26   And labs on both of those days

## 2024-07-30 ENCOUNTER — APPOINTMENT (OUTPATIENT)
Dept: GENERAL RADIOLOGY | Facility: CLINIC | Age: 74
DRG: 233 | End: 2024-07-30
Attending: SURGERY
Payer: MEDICARE

## 2024-07-30 ENCOUNTER — ANESTHESIA (OUTPATIENT)
Dept: SURGERY | Facility: CLINIC | Age: 74
DRG: 233 | End: 2024-07-30
Payer: MEDICARE

## 2024-07-30 ENCOUNTER — HOSPITAL ENCOUNTER (INPATIENT)
Facility: CLINIC | Age: 74
LOS: 11 days | Discharge: HOME | DRG: 233 | End: 2024-08-10
Attending: SURGERY | Admitting: SURGERY
Payer: MEDICARE

## 2024-07-30 DIAGNOSIS — I25.10 CORONARY ARTERY DISEASE INVOLVING NATIVE CORONARY ARTERY OF NATIVE HEART, UNSPECIFIED WHETHER ANGINA PRESENT: Primary | ICD-10-CM

## 2024-07-30 DIAGNOSIS — Z95.1 S/P CABG (CORONARY ARTERY BYPASS GRAFT): ICD-10-CM

## 2024-07-30 LAB
ALBUMIN SERPL BCG-MCNC: 3.2 G/DL (ref 3.5–5.2)
ALBUMIN SERPL BCG-MCNC: 3.4 G/DL (ref 3.5–5.2)
ALBUMIN SERPL BCG-MCNC: 3.4 G/DL (ref 3.5–5.2)
ALLEN'S TEST: ABNORMAL
ALP SERPL-CCNC: 67 U/L (ref 40–150)
ALP SERPL-CCNC: 73 U/L (ref 40–150)
ALP SERPL-CCNC: 73 U/L (ref 40–150)
ALT SERPL W P-5'-P-CCNC: 9 U/L (ref 0–70)
ANION GAP SERPL CALCULATED.3IONS-SCNC: 13 MMOL/L (ref 7–15)
ANION GAP SERPL CALCULATED.3IONS-SCNC: 9 MMOL/L (ref 7–15)
APTT PPP: 29 SECONDS (ref 22–38)
APTT PPP: 31 SECONDS (ref 22–38)
APTT PPP: 32 SECONDS (ref 22–38)
AST SERPL W P-5'-P-CCNC: 37 U/L (ref 0–45)
AST SERPL W P-5'-P-CCNC: 46 U/L (ref 0–45)
AST SERPL W P-5'-P-CCNC: 46 U/L (ref 0–45)
BASE EXCESS BLDA CALC-SCNC: -0.5 MMOL/L (ref -3–3)
BASE EXCESS BLDA CALC-SCNC: -0.6 MMOL/L (ref -3–3)
BASE EXCESS BLDA CALC-SCNC: -1.2 MMOL/L (ref -3–3)
BASE EXCESS BLDA CALC-SCNC: -1.3 MMOL/L (ref -3–3)
BASE EXCESS BLDA CALC-SCNC: -1.4 MMOL/L (ref -3–3)
BASE EXCESS BLDA CALC-SCNC: -1.7 MMOL/L (ref -3–3)
BASE EXCESS BLDA CALC-SCNC: -2.1 MMOL/L (ref -3–3)
BASE EXCESS BLDA CALC-SCNC: -2.2 MMOL/L (ref -3–3)
BASE EXCESS BLDA CALC-SCNC: -2.3 MMOL/L (ref -3–3)
BASE EXCESS BLDA CALC-SCNC: -2.3 MMOL/L (ref -3–3)
BASE EXCESS BLDA CALC-SCNC: -2.4 MMOL/L (ref -3–3)
BASE EXCESS BLDA CALC-SCNC: -2.6 MMOL/L (ref -3–3)
BASE EXCESS BLDA CALC-SCNC: -3 MMOL/L (ref -3–3)
BASE EXCESS BLDA CALC-SCNC: -3.1 MMOL/L (ref -3–3)
BASE EXCESS BLDA CALC-SCNC: -3.2 MMOL/L (ref -3–3)
BASE EXCESS BLDA CALC-SCNC: 1 MMOL/L (ref -3–3)
BASE EXCESS BLDV CALC-SCNC: -1.8 MMOL/L (ref -3–3)
BILIRUB DIRECT SERPL-MCNC: 0.29 MG/DL (ref 0–0.3)
BILIRUB SERPL-MCNC: 0.6 MG/DL
BLD PROD TYP BPU: NORMAL
BLOOD COMPONENT TYPE: NORMAL
BUN SERPL-MCNC: 27.9 MG/DL (ref 8–23)
BUN SERPL-MCNC: 29.6 MG/DL (ref 8–23)
CA-I BLD-MCNC: 4.2 MG/DL (ref 4.4–5.2)
CA-I BLD-MCNC: 4.3 MG/DL (ref 4.4–5.2)
CA-I BLD-MCNC: 4.7 MG/DL (ref 4.4–5.2)
CA-I BLD-MCNC: 4.8 MG/DL (ref 4.4–5.2)
CA-I BLD-MCNC: 4.9 MG/DL (ref 4.4–5.2)
CA-I BLD-MCNC: 4.9 MG/DL (ref 4.4–5.2)
CA-I BLD-MCNC: 5 MG/DL (ref 4.4–5.2)
CA-I BLD-MCNC: 5 MG/DL (ref 4.4–5.2)
CA-I BLD-MCNC: 5.1 MG/DL (ref 4.4–5.2)
CALCIUM SERPL-MCNC: 8.8 MG/DL (ref 8.8–10.4)
CALCIUM SERPL-MCNC: 9 MG/DL (ref 8.8–10.4)
CF REDUC 60M P MA LENFR BLD TEG: 0 % (ref 0–15)
CFT BLD TEG: 1.1 MINUTE (ref 1–3)
CHLORIDE SERPL-SCNC: 107 MMOL/L (ref 98–107)
CHLORIDE SERPL-SCNC: 108 MMOL/L (ref 98–107)
CI (COAGULATION INDEX)(Z) NON NATIVE: 2.8 (ref -3–3)
CLOT ANGLE BLD TEG: 74.2 DEGREES (ref 53–72)
CLOT INIT BLD TEG: 5.7 MINUTE (ref 5–10)
CLOT INIT KAOL IND TO POST HEP NEUT TRTO: 1 {RATIO}
CLOT INIT KAOL IND TO POST HEP NEUT TRTO: 1.1 {RATIO}
CLOT INIT KAOLIN IND BLD US: 134 SEC (ref 113–166)
CLOT INIT KAOLIN IND BLD US: 146 SEC (ref 113–166)
CLOT INIT KAOLIN IND P HEP NEUT BLD US: 135 SEC (ref 103–153)
CLOT INIT KAOLIN IND P HEP NEUT BLD US: 135 SEC (ref 103–153)
CLOT LYSIS 30M P MA LENFR BLD TEG: 0 % (ref 0–8)
CLOT STIFF PLT CONT BLD CALC: 17 HPA (ref 11.9–29.8)
CLOT STIFF PLT CONT BLD CALC: 24.6 HPA (ref 11.9–29.8)
CLOT STIFF TF IND P HEP NEUT BLD US: 20.8 HPA (ref 13–33.2)
CLOT STIFF TF IND P HEP NEUT BLD US: 30.3 HPA (ref 13–33.2)
CLOT STIFF TF IND+IIB-IIIA INH P HEP NEU: 3.8 HPA (ref 1–3.7)
CLOT STIFF TF IND+IIB-IIIA INH P HEP NEU: 5.7 HPA (ref 1–3.7)
CLOT STRENGTH BLD TEG: 14.3 KD/SC (ref 4.5–11)
CODING SYSTEM: NORMAL
COHGB MFR BLD: 99.3 % (ref 95–96)
COHGB MFR BLD: >100 % (ref 95–96)
CREAT SERPL-MCNC: 1.23 MG/DL (ref 0.67–1.17)
CREAT SERPL-MCNC: 1.36 MG/DL (ref 0.67–1.17)
CROSSMATCH: NORMAL
EGFRCR SERPLBLD CKD-EPI 2021: 54 ML/MIN/1.73M2
EGFRCR SERPLBLD CKD-EPI 2021: 61 ML/MIN/1.73M2
ERYTHROCYTE [DISTWIDTH] IN BLOOD BY AUTOMATED COUNT: 18.3 % (ref 10–15)
ERYTHROCYTE [DISTWIDTH] IN BLOOD BY AUTOMATED COUNT: 18.8 % (ref 10–15)
FIBRINOGEN PPP-MCNC: 297 MG/DL (ref 170–510)
FIBRINOGEN PPP-MCNC: 313 MG/DL (ref 170–510)
FIBRINOGEN PPP-MCNC: 344 MG/DL (ref 170–510)
GLUCOSE BLD-MCNC: 136 MG/DL (ref 70–99)
GLUCOSE BLD-MCNC: 136 MG/DL (ref 70–99)
GLUCOSE BLD-MCNC: 137 MG/DL (ref 70–99)
GLUCOSE BLD-MCNC: 145 MG/DL (ref 70–99)
GLUCOSE BLD-MCNC: 146 MG/DL (ref 70–99)
GLUCOSE BLD-MCNC: 146 MG/DL (ref 70–99)
GLUCOSE BLD-MCNC: 151 MG/DL (ref 70–99)
GLUCOSE BLD-MCNC: 152 MG/DL (ref 70–99)
GLUCOSE BLD-MCNC: 154 MG/DL (ref 70–99)
GLUCOSE BLD-MCNC: 162 MG/DL (ref 70–99)
GLUCOSE BLD-MCNC: 167 MG/DL (ref 70–99)
GLUCOSE BLD-MCNC: 172 MG/DL (ref 70–99)
GLUCOSE BLDC GLUCOMTR-MCNC: 114 MG/DL (ref 70–99)
GLUCOSE BLDC GLUCOMTR-MCNC: 126 MG/DL (ref 70–99)
GLUCOSE BLDC GLUCOMTR-MCNC: 127 MG/DL (ref 70–99)
GLUCOSE BLDC GLUCOMTR-MCNC: 128 MG/DL (ref 70–99)
GLUCOSE BLDC GLUCOMTR-MCNC: 129 MG/DL (ref 70–99)
GLUCOSE BLDC GLUCOMTR-MCNC: 139 MG/DL (ref 70–99)
GLUCOSE SERPL-MCNC: 124 MG/DL (ref 70–99)
GLUCOSE SERPL-MCNC: 133 MG/DL (ref 70–99)
HCO3 BLD-SCNC: 24 MMOL/L (ref 21–28)
HCO3 BLD-SCNC: 25 MMOL/L (ref 21–28)
HCO3 BLD-SCNC: 25 MMOL/L (ref 21–28)
HCO3 BLDA-SCNC: 22 MMOL/L (ref 21–28)
HCO3 BLDA-SCNC: 23 MMOL/L (ref 21–28)
HCO3 BLDA-SCNC: 24 MMOL/L (ref 21–28)
HCO3 BLDA-SCNC: 24 MMOL/L (ref 21–28)
HCO3 BLDA-SCNC: 25 MMOL/L (ref 21–28)
HCO3 BLDA-SCNC: 26 MMOL/L (ref 21–28)
HCO3 BLDV-SCNC: 24 MMOL/L (ref 21–28)
HCO3 SERPL-SCNC: 21 MMOL/L (ref 22–29)
HCO3 SERPL-SCNC: 23 MMOL/L (ref 22–29)
HCT VFR BLD AUTO: 22.3 % (ref 40–53)
HCT VFR BLD AUTO: 28 % (ref 40–53)
HGB BLD-MCNC: 6.7 G/DL (ref 13.3–17.7)
HGB BLD-MCNC: 6.9 G/DL (ref 13.3–17.7)
HGB BLD-MCNC: 7.1 G/DL (ref 13.3–17.7)
HGB BLD-MCNC: 7.2 G/DL (ref 13.3–17.7)
HGB BLD-MCNC: 7.3 G/DL (ref 13.3–17.7)
HGB BLD-MCNC: 7.5 G/DL (ref 13.3–17.7)
HGB BLD-MCNC: 7.6 G/DL (ref 13.3–17.7)
HGB BLD-MCNC: 7.7 G/DL (ref 13.3–17.7)
HGB BLD-MCNC: 7.9 G/DL (ref 13.3–17.7)
HGB BLD-MCNC: 7.9 G/DL (ref 13.3–17.7)
HGB BLD-MCNC: 8.1 G/DL (ref 13.3–17.7)
HGB BLD-MCNC: 8.1 G/DL (ref 13.3–17.7)
HGB BLD-MCNC: 8.4 G/DL (ref 13.3–17.7)
HGB BLD-MCNC: 9 G/DL (ref 13.3–17.7)
INR PPP: 1.32 (ref 0.85–1.15)
INR PPP: 1.41 (ref 0.85–1.15)
INR PPP: 1.49 (ref 0.85–1.15)
ISSUE DATE AND TIME: NORMAL
LACTATE BLD-SCNC: 0.7 MMOL/L (ref 0.7–2)
LACTATE BLD-SCNC: 0.8 MMOL/L (ref 0.7–2)
LACTATE BLD-SCNC: 0.8 MMOL/L (ref 0.7–2)
LACTATE BLD-SCNC: 0.9 MMOL/L (ref 0.7–2)
LACTATE BLD-SCNC: 1 MMOL/L (ref 0.7–2)
LACTATE BLD-SCNC: 1.1 MMOL/L (ref 0.7–2)
LACTATE BLD-SCNC: 1.2 MMOL/L (ref 0.7–2)
LACTATE BLD-SCNC: 1.2 MMOL/L (ref 0.7–2)
LACTATE BLD-SCNC: 1.9 MMOL/L (ref 0.7–2)
LACTATE SERPL-SCNC: 0.9 MMOL/L (ref 0.7–2)
LACTATE SERPL-SCNC: 0.9 MMOL/L (ref 0.7–2)
LACTATE SERPL-SCNC: 1.6 MMOL/L (ref 0.7–2)
MAGNESIUM SERPL-MCNC: 2.7 MG/DL (ref 1.7–2.3)
MAGNESIUM SERPL-MCNC: 2.7 MG/DL (ref 1.7–2.3)
MCF BLD TEG: 74.1 MM (ref 50–70)
MCH RBC QN AUTO: 29 PG (ref 26.5–33)
MCH RBC QN AUTO: 29.2 PG (ref 26.5–33)
MCHC RBC AUTO-ENTMCNC: 32.1 G/DL (ref 31.5–36.5)
MCHC RBC AUTO-ENTMCNC: 32.7 G/DL (ref 31.5–36.5)
MCV RBC AUTO: 89 FL (ref 78–100)
MCV RBC AUTO: 90 FL (ref 78–100)
O2/TOTAL GAS SETTING VFR VENT: 100 %
O2/TOTAL GAS SETTING VFR VENT: 100 %
O2/TOTAL GAS SETTING VFR VENT: 38 %
O2/TOTAL GAS SETTING VFR VENT: 40 %
O2/TOTAL GAS SETTING VFR VENT: 50 %
O2/TOTAL GAS SETTING VFR VENT: 50 %
O2/TOTAL GAS SETTING VFR VENT: 51 %
O2/TOTAL GAS SETTING VFR VENT: 70 %
O2/TOTAL GAS SETTING VFR VENT: 80 %
OXYHGB MFR BLDA: 95 % (ref 92–100)
OXYHGB MFR BLDA: 95 % (ref 92–100)
OXYHGB MFR BLDA: 96 % (ref 92–100)
OXYHGB MFR BLDA: 96 % (ref 92–100)
OXYHGB MFR BLDA: 97 % (ref 92–100)
OXYHGB MFR BLDA: 98 % (ref 92–100)
OXYHGB MFR BLDV: 75 % (ref 70–75)
OXYHGB MFR BLDV: 75 % (ref 70–75)
PCO2 BLD: 43 MM HG (ref 35–45)
PCO2 BLD: 44 MM HG (ref 35–45)
PCO2 BLD: 45 MM HG (ref 35–45)
PCO2 BLD: 51 MM HG (ref 35–45)
PCO2 BLD: 54 MM HG (ref 35–45)
PCO2 BLDA: 37 MM HG (ref 35–45)
PCO2 BLDA: 38 MM HG (ref 35–45)
PCO2 BLDA: 38 MM HG (ref 35–45)
PCO2 BLDA: 39 MM HG (ref 35–45)
PCO2 BLDA: 40 MM HG (ref 35–45)
PCO2 BLDA: 41 MM HG (ref 35–45)
PCO2 BLDA: 41 MM HG (ref 35–45)
PCO2 BLDA: 42 MM HG (ref 35–45)
PCO2 BLDA: 42 MM HG (ref 35–45)
PCO2 BLDA: 43 MM HG (ref 35–45)
PCO2 BLDA: 44 MM HG (ref 35–45)
PCO2 BLDV: 47 MM HG (ref 40–50)
PEEP: 5 CM H2O
PEEP: 5 CM H2O
PH BLD: 7.27 [PH] (ref 7.35–7.45)
PH BLD: 7.29 [PH] (ref 7.35–7.45)
PH BLD: 7.34 [PH] (ref 7.35–7.45)
PH BLD: 7.34 [PH] (ref 7.35–7.45)
PH BLD: 7.36 [PH] (ref 7.35–7.45)
PH BLDA: 7.34 [PH] (ref 7.35–7.45)
PH BLDA: 7.35 [PH] (ref 7.35–7.45)
PH BLDA: 7.35 [PH] (ref 7.35–7.45)
PH BLDA: 7.36 [PH] (ref 7.35–7.45)
PH BLDA: 7.36 [PH] (ref 7.35–7.45)
PH BLDA: 7.37 [PH] (ref 7.35–7.45)
PH BLDA: 7.38 [PH] (ref 7.35–7.45)
PH BLDA: 7.4 [PH] (ref 7.35–7.45)
PH BLDA: 7.41 [PH] (ref 7.35–7.45)
PH BLDV: 7.32 [PH] (ref 7.32–7.43)
PHOSPHATE SERPL-MCNC: 3.4 MG/DL (ref 2.5–4.5)
PHOSPHATE SERPL-MCNC: 4.3 MG/DL (ref 2.5–4.5)
PLATELET # BLD AUTO: 136 10E3/UL (ref 150–450)
PLATELET # BLD AUTO: 176 10E3/UL (ref 150–450)
PLATELET # BLD AUTO: 99 10E3/UL (ref 150–450)
PO2 BLD: 120 MM HG (ref 80–105)
PO2 BLD: 133 MM HG (ref 80–105)
PO2 BLD: 140 MM HG (ref 80–105)
PO2 BLD: 145 MM HG (ref 80–105)
PO2 BLD: 147 MM HG (ref 80–105)
PO2 BLDA: 124 MM HG (ref 80–105)
PO2 BLDA: 132 MM HG (ref 80–105)
PO2 BLDA: 164 MM HG (ref 80–105)
PO2 BLDA: 180 MM HG (ref 80–105)
PO2 BLDA: 239 MM HG (ref 80–105)
PO2 BLDA: 335 MM HG (ref 80–105)
PO2 BLDA: 366 MM HG (ref 80–105)
PO2 BLDA: 398 MM HG (ref 80–105)
PO2 BLDA: 516 MM HG (ref 80–105)
PO2 BLDA: 88 MM HG (ref 80–105)
PO2 BLDA: 96 MM HG (ref 80–105)
PO2 BLDV: 46 MM HG (ref 25–47)
POTASSIUM BLD-SCNC: 3.8 MMOL/L (ref 3.4–5.3)
POTASSIUM BLD-SCNC: 3.8 MMOL/L (ref 3.4–5.3)
POTASSIUM BLD-SCNC: 3.9 MMOL/L (ref 3.4–5.3)
POTASSIUM BLD-SCNC: 3.9 MMOL/L (ref 3.4–5.3)
POTASSIUM BLD-SCNC: 4 MMOL/L (ref 3.4–5.3)
POTASSIUM BLD-SCNC: 4.2 MMOL/L (ref 3.4–5.3)
POTASSIUM BLD-SCNC: 4.2 MMOL/L (ref 3.4–5.3)
POTASSIUM BLD-SCNC: 4.3 MMOL/L (ref 3.4–5.3)
POTASSIUM BLD-SCNC: 4.4 MMOL/L (ref 3.4–5.3)
POTASSIUM BLD-SCNC: 4.4 MMOL/L (ref 3.4–5.3)
POTASSIUM SERPL-SCNC: 4.4 MMOL/L (ref 3.4–5.3)
POTASSIUM SERPL-SCNC: 4.6 MMOL/L (ref 3.4–5.3)
PROT SERPL-MCNC: 4.7 G/DL (ref 6.4–8.3)
PROT SERPL-MCNC: 5.3 G/DL (ref 6.4–8.3)
PROT SERPL-MCNC: 5.3 G/DL (ref 6.4–8.3)
RBC # BLD AUTO: 2.5 10E6/UL (ref 4.4–5.9)
RBC # BLD AUTO: 3.1 10E6/UL (ref 4.4–5.9)
SAO2 % BLDA: 100 % (ref 95–96)
SAO2 % BLDA: 96 % (ref 92–100)
SAO2 % BLDA: 97 % (ref 92–100)
SAO2 % BLDA: 97 % (ref 92–100)
SAO2 % BLDA: 98 % (ref 92–100)
SAO2 % BLDA: 98 % (ref 92–100)
SAO2 % BLDA: 98 % (ref 95–96)
SAO2 % BLDA: 99 % (ref 95–96)
SAO2 % BLDA: >100 % (ref 95–96)
SAO2 % BLDV: 78 % (ref 70–75)
SODIUM BLD-SCNC: 137 MMOL/L (ref 135–145)
SODIUM BLD-SCNC: 138 MMOL/L (ref 135–145)
SODIUM BLD-SCNC: 139 MMOL/L (ref 135–145)
SODIUM BLD-SCNC: 140 MMOL/L (ref 135–145)
SODIUM BLD-SCNC: 140 MMOL/L (ref 135–145)
SODIUM SERPL-SCNC: 140 MMOL/L (ref 135–145)
SODIUM SERPL-SCNC: 141 MMOL/L (ref 135–145)
UFH PPP CHRO-ACNC: <0.1 IU/ML
UNIT ABO/RH: NORMAL
UNIT NUMBER: NORMAL
UNIT STATUS: NORMAL
UNIT TYPE ISBT: 6200
WBC # BLD AUTO: 12.7 10E3/UL (ref 4–11)
WBC # BLD AUTO: 8.9 10E3/UL (ref 4–11)

## 2024-07-30 PROCEDURE — 85384 FIBRINOGEN ACTIVITY: CPT | Performed by: SURGERY

## 2024-07-30 PROCEDURE — 99291 CRITICAL CARE FIRST HOUR: CPT | Mod: GC | Performed by: ANESTHESIOLOGY

## 2024-07-30 PROCEDURE — 250N000011 HC RX IP 250 OP 636

## 2024-07-30 PROCEDURE — 250N000009 HC RX 250

## 2024-07-30 PROCEDURE — 82810 BLOOD GASES O2 SAT ONLY: CPT

## 2024-07-30 PROCEDURE — 258N000003 HC RX IP 258 OP 636

## 2024-07-30 PROCEDURE — 02580ZZ DESTRUCTION OF CONDUCTION MECHANISM, OPEN APPROACH: ICD-10-PCS | Performed by: SURGERY

## 2024-07-30 PROCEDURE — 258N000003 HC RX IP 258 OP 636: Performed by: SURGERY

## 2024-07-30 PROCEDURE — 85520 HEPARIN ASSAY: CPT

## 2024-07-30 PROCEDURE — 250N000011 HC RX IP 250 OP 636: Performed by: SURGERY

## 2024-07-30 PROCEDURE — 71045 X-RAY EXAM CHEST 1 VIEW: CPT

## 2024-07-30 PROCEDURE — 250N000024 HC ISOFLURANE, PER MIN: Performed by: SURGERY

## 2024-07-30 PROCEDURE — 250N000009 HC RX 250: Performed by: SURGERY

## 2024-07-30 PROCEDURE — 272N000088 HC PUMP APP ADULT PERFUSION: Performed by: SURGERY

## 2024-07-30 PROCEDURE — 250N000013 HC RX MED GY IP 250 OP 250 PS 637: Performed by: SURGERY

## 2024-07-30 PROCEDURE — 84155 ASSAY OF PROTEIN SERUM: CPT | Performed by: SURGERY

## 2024-07-30 PROCEDURE — 02100Z9 BYPASS CORONARY ARTERY, ONE ARTERY FROM LEFT INTERNAL MAMMARY, OPEN APPROACH: ICD-10-PCS | Performed by: SURGERY

## 2024-07-30 PROCEDURE — P9037 PLATE PHERES LEUKOREDU IRRAD: HCPCS

## 2024-07-30 PROCEDURE — 85396 CLOTTING ASSAY WHOLE BLOOD: CPT

## 2024-07-30 PROCEDURE — 272N000001 HC OR GENERAL SUPPLY STERILE: Performed by: SURGERY

## 2024-07-30 PROCEDURE — 5A1221Z PERFORMANCE OF CARDIAC OUTPUT, CONTINUOUS: ICD-10-PCS | Performed by: SURGERY

## 2024-07-30 PROCEDURE — 71045 X-RAY EXAM CHEST 1 VIEW: CPT | Mod: 26 | Performed by: RADIOLOGY

## 2024-07-30 PROCEDURE — 83605 ASSAY OF LACTIC ACID: CPT

## 2024-07-30 PROCEDURE — 370N000017 HC ANESTHESIA TECHNICAL FEE, PER MIN: Performed by: SURGERY

## 2024-07-30 PROCEDURE — 250N000011 HC RX IP 250 OP 636: Performed by: STUDENT IN AN ORGANIZED HEALTH CARE EDUCATION/TRAINING PROGRAM

## 2024-07-30 PROCEDURE — 94002 VENT MGMT INPAT INIT DAY: CPT

## 2024-07-30 PROCEDURE — 85730 THROMBOPLASTIN TIME PARTIAL: CPT | Performed by: SURGERY

## 2024-07-30 PROCEDURE — 33508 ENDOSCOPIC VEIN HARVEST: CPT | Mod: XU | Performed by: SURGERY

## 2024-07-30 PROCEDURE — 36415 COLL VENOUS BLD VENIPUNCTURE: CPT | Performed by: SURGERY

## 2024-07-30 PROCEDURE — 82805 BLOOD GASES W/O2 SATURATION: CPT | Performed by: STUDENT IN AN ORGANIZED HEALTH CARE EDUCATION/TRAINING PROGRAM

## 2024-07-30 PROCEDURE — 33517 CABG ARTERY-VEIN SINGLE: CPT | Mod: GC | Performed by: SURGERY

## 2024-07-30 PROCEDURE — 82805 BLOOD GASES W/O2 SATURATION: CPT | Performed by: SURGERY

## 2024-07-30 PROCEDURE — 84295 ASSAY OF SERUM SODIUM: CPT | Performed by: SURGERY

## 2024-07-30 PROCEDURE — 360N000079 HC SURGERY LEVEL 6, PER MIN: Performed by: SURGERY

## 2024-07-30 PROCEDURE — 250N000009 HC RX 250: Performed by: STUDENT IN AN ORGANIZED HEALTH CARE EDUCATION/TRAINING PROGRAM

## 2024-07-30 PROCEDURE — P9016 RBC LEUKOCYTES REDUCED: HCPCS

## 2024-07-30 PROCEDURE — 86923 COMPATIBILITY TEST ELECTRIC: CPT

## 2024-07-30 PROCEDURE — 33533 CABG ARTERIAL SINGLE: CPT | Mod: 22 | Performed by: SURGERY

## 2024-07-30 PROCEDURE — P9045 ALBUMIN (HUMAN), 5%, 250 ML: HCPCS

## 2024-07-30 PROCEDURE — 82805 BLOOD GASES W/O2 SATURATION: CPT

## 2024-07-30 PROCEDURE — 278N000051 HC OR IMPLANT GENERAL: Performed by: SURGERY

## 2024-07-30 PROCEDURE — 200N000002 HC R&B ICU UMMC

## 2024-07-30 PROCEDURE — 272N000085 HC PACK CELL SAVER CSP: Performed by: SURGERY

## 2024-07-30 PROCEDURE — 999N000157 HC STATISTIC RCP TIME EA 10 MIN

## 2024-07-30 PROCEDURE — 94640 AIRWAY INHALATION TREATMENT: CPT

## 2024-07-30 PROCEDURE — 84100 ASSAY OF PHOSPHORUS: CPT

## 2024-07-30 PROCEDURE — 02L70CK OCCLUSION OF LEFT ATRIAL APPENDAGE WITH EXTRALUMINAL DEVICE, OPEN APPROACH: ICD-10-PCS | Performed by: SURGERY

## 2024-07-30 PROCEDURE — 250N000025 HC SEVOFLURANE, PER MIN: Performed by: SURGERY

## 2024-07-30 PROCEDURE — 33257 ABLATE ATRIA LMTD ADD-ON: CPT | Mod: GC | Performed by: SURGERY

## 2024-07-30 PROCEDURE — 93005 ELECTROCARDIOGRAM TRACING: CPT

## 2024-07-30 PROCEDURE — 85610 PROTHROMBIN TIME: CPT

## 2024-07-30 PROCEDURE — 5A09357 ASSISTANCE WITH RESPIRATORY VENTILATION, LESS THAN 24 CONSECUTIVE HOURS, CONTINUOUS POSITIVE AIRWAY PRESSURE: ICD-10-PCS | Performed by: SURGERY

## 2024-07-30 PROCEDURE — 85396 CLOTTING ASSAY WHOLE BLOOD: CPT | Performed by: SURGERY

## 2024-07-30 PROCEDURE — 83735 ASSAY OF MAGNESIUM: CPT

## 2024-07-30 PROCEDURE — 33533 CABG ARTERIAL SINGLE: CPT | Performed by: ANESTHESIOLOGY

## 2024-07-30 PROCEDURE — 410N000004: Performed by: SURGERY

## 2024-07-30 PROCEDURE — 82330 ASSAY OF CALCIUM: CPT

## 2024-07-30 PROCEDURE — 410N000003 HC PER-PERFUSION 1ST 30 MIN: Performed by: SURGERY

## 2024-07-30 PROCEDURE — 85384 FIBRINOGEN ACTIVITY: CPT

## 2024-07-30 PROCEDURE — 06BQ4ZZ EXCISION OF LEFT SAPHENOUS VEIN, PERCUTANEOUS ENDOSCOPIC APPROACH: ICD-10-PCS | Performed by: SURGERY

## 2024-07-30 PROCEDURE — 99100 ANES PT EXTEME AGE<1 YR&>70: CPT | Performed by: ANESTHESIOLOGY

## 2024-07-30 PROCEDURE — 258N000003 HC RX IP 258 OP 636: Performed by: STUDENT IN AN ORGANIZED HEALTH CARE EDUCATION/TRAINING PROGRAM

## 2024-07-30 PROCEDURE — 021009W BYPASS CORONARY ARTERY, ONE ARTERY FROM AORTA WITH AUTOLOGOUS VENOUS TISSUE, OPEN APPROACH: ICD-10-PCS | Performed by: SURGERY

## 2024-07-30 PROCEDURE — C1713 ANCHOR/SCREW BN/BN,TIS/BN: HCPCS | Performed by: SURGERY

## 2024-07-30 PROCEDURE — 82330 ASSAY OF CALCIUM: CPT | Performed by: SURGERY

## 2024-07-30 PROCEDURE — 85049 AUTOMATED PLATELET COUNT: CPT

## 2024-07-30 PROCEDURE — 85610 PROTHROMBIN TIME: CPT | Performed by: SURGERY

## 2024-07-30 PROCEDURE — 85049 AUTOMATED PLATELET COUNT: CPT | Performed by: SURGERY

## 2024-07-30 PROCEDURE — 85730 THROMBOPLASTIN TIME PARTIAL: CPT

## 2024-07-30 PROCEDURE — 83605 ASSAY OF LACTIC ACID: CPT | Performed by: SURGERY

## 2024-07-30 PROCEDURE — 999N000141 HC STATISTIC PRE-PROCEDURE NURSING ASSESSMENT: Performed by: SURGERY

## 2024-07-30 PROCEDURE — 999N000259 HC STATISTIC EXTUBATION

## 2024-07-30 DEVICE — IMPLANTABLE DEVICE: Type: IMPLANTABLE DEVICE | Site: CHEST | Status: FUNCTIONAL

## 2024-07-30 DEVICE — IMP ATRICLIP FLEX V DEVICE LAA EXLUSION 40MM ACHV40: Type: IMPLANTABLE DEVICE | Site: CHEST | Status: FUNCTIONAL

## 2024-07-30 RX ORDER — LIDOCAINE 40 MG/G
CREAM TOPICAL
Status: DISCONTINUED | OUTPATIENT
Start: 2024-07-30 | End: 2024-08-10 | Stop reason: HOSPADM

## 2024-07-30 RX ORDER — CALCIUM GLUCONATE 20 MG/ML
1 INJECTION, SOLUTION INTRAVENOUS
Status: ACTIVE | OUTPATIENT
Start: 2024-07-30 | End: 2024-08-05

## 2024-07-30 RX ORDER — CALCIUM CHLORIDE 100 MG/ML
INJECTION INTRAVENOUS; INTRAVENTRICULAR PRN
Status: DISCONTINUED | OUTPATIENT
Start: 2024-07-30 | End: 2024-07-30

## 2024-07-30 RX ORDER — ACETAMINOPHEN 325 MG/1
975 TABLET ORAL EVERY 8 HOURS
Status: COMPLETED | OUTPATIENT
Start: 2024-07-30 | End: 2024-08-02

## 2024-07-30 RX ORDER — NOREPINEPHRINE BITARTRATE 0.06 MG/ML
.01-.4 INJECTION, SOLUTION INTRAVENOUS CONTINUOUS PRN
Status: DISCONTINUED | OUTPATIENT
Start: 2024-07-30 | End: 2024-07-30

## 2024-07-30 RX ORDER — NICOTINE POLACRILEX 4 MG
15-30 LOZENGE BUCCAL
Status: DISCONTINUED | OUTPATIENT
Start: 2024-07-30 | End: 2024-08-10 | Stop reason: HOSPADM

## 2024-07-30 RX ORDER — CHLORHEXIDINE GLUCONATE ORAL RINSE 1.2 MG/ML
10 SOLUTION DENTAL ONCE
Status: COMPLETED | OUTPATIENT
Start: 2024-07-30 | End: 2024-07-30

## 2024-07-30 RX ORDER — AMOXICILLIN 250 MG
1 CAPSULE ORAL 2 TIMES DAILY PRN
Status: DISCONTINUED | OUTPATIENT
Start: 2024-07-30 | End: 2024-08-10 | Stop reason: HOSPADM

## 2024-07-30 RX ORDER — LIDOCAINE HYDROCHLORIDE 20 MG/ML
INJECTION, SOLUTION INFILTRATION; PERINEURAL PRN
Status: DISCONTINUED | OUTPATIENT
Start: 2024-07-30 | End: 2024-07-30

## 2024-07-30 RX ORDER — ACETAMINOPHEN 325 MG/10.15ML
650 LIQUID ORAL EVERY 4 HOURS PRN
Status: DISCONTINUED | OUTPATIENT
Start: 2024-07-30 | End: 2024-07-30

## 2024-07-30 RX ORDER — AMOXICILLIN 250 MG
1 CAPSULE ORAL 2 TIMES DAILY
Status: DISCONTINUED | OUTPATIENT
Start: 2024-07-30 | End: 2024-08-10 | Stop reason: HOSPADM

## 2024-07-30 RX ORDER — DEXMEDETOMIDINE HYDROCHLORIDE 4 UG/ML
.2-1.2 INJECTION, SOLUTION INTRAVENOUS CONTINUOUS
Status: DISCONTINUED | OUTPATIENT
Start: 2024-07-30 | End: 2024-07-30 | Stop reason: HOSPADM

## 2024-07-30 RX ORDER — ACETAMINOPHEN 325 MG/1
650 TABLET ORAL EVERY 4 HOURS PRN
Status: DISCONTINUED | OUTPATIENT
Start: 2024-07-30 | End: 2024-07-30

## 2024-07-30 RX ORDER — CEFAZOLIN SODIUM/WATER 3 G/30 ML
3 SYRINGE (ML) INTRAVENOUS SEE ADMIN INSTRUCTIONS
Status: DISCONTINUED | OUTPATIENT
Start: 2024-07-30 | End: 2024-07-30 | Stop reason: HOSPADM

## 2024-07-30 RX ORDER — PROTAMINE SULFATE 10 MG/ML
INJECTION, SOLUTION INTRAVENOUS PRN
Status: DISCONTINUED | OUTPATIENT
Start: 2024-07-30 | End: 2024-07-30

## 2024-07-30 RX ORDER — ACETAMINOPHEN 325 MG/1
975 TABLET ORAL ONCE
Status: COMPLETED | OUTPATIENT
Start: 2024-07-30 | End: 2024-07-30

## 2024-07-30 RX ORDER — OXYCODONE HYDROCHLORIDE 10 MG/1
10 TABLET ORAL EVERY 4 HOURS PRN
Status: DISCONTINUED | OUTPATIENT
Start: 2024-07-30 | End: 2024-08-02

## 2024-07-30 RX ORDER — HEPARIN SODIUM 5000 [USP'U]/.5ML
5000 INJECTION, SOLUTION INTRAVENOUS; SUBCUTANEOUS EVERY 8 HOURS
Status: DISCONTINUED | OUTPATIENT
Start: 2024-07-31 | End: 2024-08-10 | Stop reason: HOSPADM

## 2024-07-30 RX ORDER — NOREPINEPHRINE BITARTRATE 0.06 MG/ML
.01-.6 INJECTION, SOLUTION INTRAVENOUS CONTINUOUS
Status: DISCONTINUED | OUTPATIENT
Start: 2024-07-30 | End: 2024-07-30 | Stop reason: HOSPADM

## 2024-07-30 RX ORDER — NICOTINE POLACRILEX 4 MG
15-30 LOZENGE BUCCAL
Status: DISCONTINUED | OUTPATIENT
Start: 2024-07-30 | End: 2024-07-30

## 2024-07-30 RX ORDER — NOREPINEPHRINE BITARTRATE 0.06 MG/ML
.01-.4 INJECTION, SOLUTION INTRAVENOUS CONTINUOUS
Status: DISCONTINUED | OUTPATIENT
Start: 2024-07-30 | End: 2024-08-01

## 2024-07-30 RX ORDER — DEXMEDETOMIDINE HYDROCHLORIDE 4 UG/ML
.2-.7 INJECTION, SOLUTION INTRAVENOUS CONTINUOUS
Status: DISCONTINUED | OUTPATIENT
Start: 2024-07-30 | End: 2024-07-31

## 2024-07-30 RX ORDER — FIBRINOGEN (HUMAN) 700-1300MG
1150 KIT INTRAVENOUS ONCE
Status: DISCONTINUED | OUTPATIENT
Start: 2024-07-30 | End: 2024-07-30

## 2024-07-30 RX ORDER — CALCIUM GLUCONATE 20 MG/ML
2 INJECTION, SOLUTION INTRAVENOUS
Status: ACTIVE | OUTPATIENT
Start: 2024-07-30 | End: 2024-08-05

## 2024-07-30 RX ORDER — GABAPENTIN 100 MG/1
100 CAPSULE ORAL
Status: COMPLETED | OUTPATIENT
Start: 2024-07-30 | End: 2024-07-30

## 2024-07-30 RX ORDER — NALOXONE HYDROCHLORIDE 0.4 MG/ML
0.2 INJECTION, SOLUTION INTRAMUSCULAR; INTRAVENOUS; SUBCUTANEOUS
Status: DISCONTINUED | OUTPATIENT
Start: 2024-07-30 | End: 2024-08-10 | Stop reason: HOSPADM

## 2024-07-30 RX ORDER — PROPOFOL 10 MG/ML
5-75 INJECTION, EMULSION INTRAVENOUS CONTINUOUS
Status: DISCONTINUED | OUTPATIENT
Start: 2024-07-30 | End: 2024-07-31

## 2024-07-30 RX ORDER — SODIUM CHLORIDE, SODIUM GLUCONATE, SODIUM ACETATE, POTASSIUM CHLORIDE AND MAGNESIUM CHLORIDE 526; 502; 368; 37; 30 MG/100ML; MG/100ML; MG/100ML; MG/100ML; MG/100ML
INJECTION, SOLUTION INTRAVENOUS CONTINUOUS PRN
Status: DISCONTINUED | OUTPATIENT
Start: 2024-07-30 | End: 2024-07-30

## 2024-07-30 RX ORDER — PROPOFOL 10 MG/ML
INJECTION, EMULSION INTRAVENOUS PRN
Status: DISCONTINUED | OUTPATIENT
Start: 2024-07-30 | End: 2024-07-30

## 2024-07-30 RX ORDER — HYDROMORPHONE HCL IN WATER/PF 6 MG/30 ML
0.2 PATIENT CONTROLLED ANALGESIA SYRINGE INTRAVENOUS
Status: DISCONTINUED | OUTPATIENT
Start: 2024-07-30 | End: 2024-08-01

## 2024-07-30 RX ORDER — ONDANSETRON 2 MG/ML
4 INJECTION INTRAMUSCULAR; INTRAVENOUS EVERY 6 HOURS PRN
Status: DISCONTINUED | OUTPATIENT
Start: 2024-07-30 | End: 2024-08-10 | Stop reason: HOSPADM

## 2024-07-30 RX ORDER — NALOXONE HYDROCHLORIDE 0.4 MG/ML
0.4 INJECTION, SOLUTION INTRAMUSCULAR; INTRAVENOUS; SUBCUTANEOUS
Status: DISCONTINUED | OUTPATIENT
Start: 2024-07-30 | End: 2024-08-10 | Stop reason: HOSPADM

## 2024-07-30 RX ORDER — SODIUM CHLORIDE, SODIUM LACTATE, POTASSIUM CHLORIDE, CALCIUM CHLORIDE 600; 310; 30; 20 MG/100ML; MG/100ML; MG/100ML; MG/100ML
INJECTION, SOLUTION INTRAVENOUS CONTINUOUS
Status: DISCONTINUED | OUTPATIENT
Start: 2024-07-30 | End: 2024-07-31

## 2024-07-30 RX ORDER — ALBUTEROL SULFATE 90 UG/1
6 AEROSOL, METERED RESPIRATORY (INHALATION) EVERY 4 HOURS
Status: DISCONTINUED | OUTPATIENT
Start: 2024-07-30 | End: 2024-07-30

## 2024-07-30 RX ORDER — IPRATROPIUM BROMIDE AND ALBUTEROL SULFATE 2.5; .5 MG/3ML; MG/3ML
3 SOLUTION RESPIRATORY (INHALATION) EVERY 4 HOURS PRN
Status: DISCONTINUED | OUTPATIENT
Start: 2024-07-30 | End: 2024-08-01

## 2024-07-30 RX ORDER — PROCHLORPERAZINE MALEATE 5 MG
5 TABLET ORAL EVERY 6 HOURS PRN
Status: DISCONTINUED | OUTPATIENT
Start: 2024-07-30 | End: 2024-08-10 | Stop reason: HOSPADM

## 2024-07-30 RX ORDER — IPRATROPIUM BROMIDE AND ALBUTEROL SULFATE 2.5; .5 MG/3ML; MG/3ML
3 SOLUTION RESPIRATORY (INHALATION) EVERY 4 HOURS
Status: DISCONTINUED | OUTPATIENT
Start: 2024-07-30 | End: 2024-07-30

## 2024-07-30 RX ORDER — SODIUM CHLORIDE, SODIUM LACTATE, POTASSIUM CHLORIDE, CALCIUM CHLORIDE 600; 310; 30; 20 MG/100ML; MG/100ML; MG/100ML; MG/100ML
INJECTION, SOLUTION INTRAVENOUS CONTINUOUS PRN
Status: DISCONTINUED | OUTPATIENT
Start: 2024-07-30 | End: 2024-07-30

## 2024-07-30 RX ORDER — CEFAZOLIN SODIUM 2 G/100ML
2 INJECTION, SOLUTION INTRAVENOUS EVERY 8 HOURS
Status: COMPLETED | OUTPATIENT
Start: 2024-07-30 | End: 2024-07-31

## 2024-07-30 RX ORDER — DEXTROSE MONOHYDRATE 25 G/50ML
25-50 INJECTION, SOLUTION INTRAVENOUS
Status: DISCONTINUED | OUTPATIENT
Start: 2024-07-30 | End: 2024-08-10 | Stop reason: HOSPADM

## 2024-07-30 RX ORDER — DEXTROSE MONOHYDRATE 25 G/50ML
25-50 INJECTION, SOLUTION INTRAVENOUS
Status: DISCONTINUED | OUTPATIENT
Start: 2024-07-30 | End: 2024-07-30

## 2024-07-30 RX ORDER — LIDOCAINE HYDROCHLORIDE 20 MG/ML
INJECTION, SOLUTION INFILTRATION; PERINEURAL
Status: COMPLETED | OUTPATIENT
Start: 2024-07-30 | End: 2024-07-30

## 2024-07-30 RX ORDER — ASPIRIN 81 MG/1
81 TABLET, CHEWABLE ORAL
Status: COMPLETED | OUTPATIENT
Start: 2024-07-30 | End: 2024-07-30

## 2024-07-30 RX ORDER — PROPOFOL 10 MG/ML
INJECTION, EMULSION INTRAVENOUS CONTINUOUS PRN
Status: DISCONTINUED | OUTPATIENT
Start: 2024-07-30 | End: 2024-07-30

## 2024-07-30 RX ORDER — PHENYLEPHRINE HCL IN 0.9% NACL 50MG/250ML
.1-6 PLASTIC BAG, INJECTION (ML) INTRAVENOUS CONTINUOUS
Status: DISCONTINUED | OUTPATIENT
Start: 2024-07-30 | End: 2024-07-30 | Stop reason: HOSPADM

## 2024-07-30 RX ORDER — PANTOPRAZOLE SODIUM 40 MG/1
40 TABLET, DELAYED RELEASE ORAL DAILY
Status: DISCONTINUED | OUTPATIENT
Start: 2024-07-30 | End: 2024-07-30

## 2024-07-30 RX ORDER — FAMOTIDINE 20 MG/1
20 TABLET, FILM COATED ORAL
Status: COMPLETED | OUTPATIENT
Start: 2024-07-30 | End: 2024-07-30

## 2024-07-30 RX ORDER — POLYETHYLENE GLYCOL 3350 17 G/17G
17 POWDER, FOR SOLUTION ORAL DAILY
Status: DISCONTINUED | OUTPATIENT
Start: 2024-07-31 | End: 2024-08-10 | Stop reason: HOSPADM

## 2024-07-30 RX ORDER — HYDRALAZINE HYDROCHLORIDE 20 MG/ML
10 INJECTION INTRAMUSCULAR; INTRAVENOUS EVERY 30 MIN PRN
Status: DISCONTINUED | OUTPATIENT
Start: 2024-07-30 | End: 2024-08-08

## 2024-07-30 RX ORDER — FENTANYL CITRATE 50 UG/ML
INJECTION, SOLUTION INTRAMUSCULAR; INTRAVENOUS PRN
Status: DISCONTINUED | OUTPATIENT
Start: 2024-07-30 | End: 2024-07-30

## 2024-07-30 RX ORDER — OXYCODONE HYDROCHLORIDE 5 MG/1
5 TABLET ORAL EVERY 4 HOURS PRN
Status: DISCONTINUED | OUTPATIENT
Start: 2024-07-30 | End: 2024-08-02

## 2024-07-30 RX ORDER — AMOXICILLIN 250 MG
2 CAPSULE ORAL 2 TIMES DAILY PRN
Status: DISCONTINUED | OUTPATIENT
Start: 2024-07-30 | End: 2024-08-10 | Stop reason: HOSPADM

## 2024-07-30 RX ORDER — BISACODYL 10 MG
10 SUPPOSITORY, RECTAL RECTAL DAILY PRN
Status: DISCONTINUED | OUTPATIENT
Start: 2024-08-02 | End: 2024-08-10 | Stop reason: HOSPADM

## 2024-07-30 RX ORDER — ASPIRIN 81 MG/1
162 TABLET, CHEWABLE ORAL
Status: COMPLETED | OUTPATIENT
Start: 2024-07-30 | End: 2024-07-30

## 2024-07-30 RX ORDER — ACETAMINOPHEN 325 MG/1
650 TABLET ORAL EVERY 4 HOURS PRN
Status: DISCONTINUED | OUTPATIENT
Start: 2024-08-02 | End: 2024-08-10 | Stop reason: HOSPADM

## 2024-07-30 RX ORDER — ONDANSETRON 4 MG/1
4 TABLET, ORALLY DISINTEGRATING ORAL EVERY 6 HOURS PRN
Status: DISCONTINUED | OUTPATIENT
Start: 2024-07-30 | End: 2024-08-10 | Stop reason: HOSPADM

## 2024-07-30 RX ORDER — ACETYLCYSTEINE 200 MG/ML
1 SOLUTION ORAL; RESPIRATORY (INHALATION)
Status: DISCONTINUED | OUTPATIENT
Start: 2024-07-30 | End: 2024-07-31

## 2024-07-30 RX ORDER — HYDROMORPHONE HCL IN WATER/PF 6 MG/30 ML
0.4 PATIENT CONTROLLED ANALGESIA SYRINGE INTRAVENOUS
Status: DISCONTINUED | OUTPATIENT
Start: 2024-07-30 | End: 2024-08-01

## 2024-07-30 RX ORDER — ACETAMINOPHEN 325 MG/1
975 TABLET ORAL ONCE
Status: DISCONTINUED | OUTPATIENT
Start: 2024-07-30 | End: 2024-07-30 | Stop reason: HOSPADM

## 2024-07-30 RX ORDER — LIDOCAINE 40 MG/G
CREAM TOPICAL
Status: DISCONTINUED | OUTPATIENT
Start: 2024-07-30 | End: 2024-07-30 | Stop reason: HOSPADM

## 2024-07-30 RX ORDER — METHOCARBAMOL 500 MG/1
500 TABLET, FILM COATED ORAL EVERY 6 HOURS PRN
Status: DISCONTINUED | OUTPATIENT
Start: 2024-07-30 | End: 2024-08-10 | Stop reason: HOSPADM

## 2024-07-30 RX ORDER — ASPIRIN 81 MG/1
162 TABLET, CHEWABLE ORAL DAILY
Status: DISCONTINUED | OUTPATIENT
Start: 2024-07-30 | End: 2024-08-09

## 2024-07-30 RX ORDER — POLYETHYLENE GLYCOL 3350 17 G/17G
17 POWDER, FOR SOLUTION ORAL DAILY PRN
Status: DISCONTINUED | OUTPATIENT
Start: 2024-07-30 | End: 2024-08-10 | Stop reason: HOSPADM

## 2024-07-30 RX ORDER — CHLORHEXIDINE GLUCONATE ORAL RINSE 1.2 MG/ML
15 SOLUTION DENTAL EVERY 12 HOURS
Status: DISCONTINUED | OUTPATIENT
Start: 2024-07-30 | End: 2024-07-31

## 2024-07-30 RX ORDER — HEPARIN SODIUM 1000 [USP'U]/ML
INJECTION, SOLUTION INTRAVENOUS; SUBCUTANEOUS PRN
Status: DISCONTINUED | OUTPATIENT
Start: 2024-07-30 | End: 2024-07-30

## 2024-07-30 RX ORDER — FENTANYL CITRATE 50 UG/ML
25-50 INJECTION, SOLUTION INTRAMUSCULAR; INTRAVENOUS
Status: DISCONTINUED | OUTPATIENT
Start: 2024-07-30 | End: 2024-07-31

## 2024-07-30 RX ORDER — CEFAZOLIN SODIUM/WATER 3 G/30 ML
3 SYRINGE (ML) INTRAVENOUS
Status: COMPLETED | OUTPATIENT
Start: 2024-07-30 | End: 2024-07-30

## 2024-07-30 RX ORDER — SODIUM CHLORIDE, SODIUM LACTATE, POTASSIUM CHLORIDE, CALCIUM CHLORIDE 600; 310; 30; 20 MG/100ML; MG/100ML; MG/100ML; MG/100ML
INJECTION, SOLUTION INTRAVENOUS CONTINUOUS
Status: DISCONTINUED | OUTPATIENT
Start: 2024-07-30 | End: 2024-07-30 | Stop reason: HOSPADM

## 2024-07-30 RX ORDER — NOREPINEPHRINE BITARTRATE 0.06 MG/ML
.01-.1 INJECTION, SOLUTION INTRAVENOUS CONTINUOUS
Status: DISCONTINUED | OUTPATIENT
Start: 2024-07-30 | End: 2024-07-30

## 2024-07-30 RX ADMIN — HYDROMORPHONE HYDROCHLORIDE 0.5 MG: 1 INJECTION, SOLUTION INTRAMUSCULAR; INTRAVENOUS; SUBCUTANEOUS at 16:13

## 2024-07-30 RX ADMIN — SODIUM CHLORIDE, SODIUM GLUCONATE, SODIUM ACETATE, POTASSIUM CHLORIDE AND MAGNESIUM CHLORIDE: 526; 502; 368; 37; 30 INJECTION, SOLUTION INTRAVENOUS at 15:44

## 2024-07-30 RX ADMIN — IPRATROPIUM BROMIDE AND ALBUTEROL SULFATE 3 ML: .5; 3 SOLUTION RESPIRATORY (INHALATION) at 20:04

## 2024-07-30 RX ADMIN — CALCIUM CHLORIDE INJECTION 500 MG: 100 INJECTION, SOLUTION INTRAVENOUS at 14:32

## 2024-07-30 RX ADMIN — SODIUM CHLORIDE, SODIUM GLUCONATE, SODIUM ACETATE, POTASSIUM CHLORIDE AND MAGNESIUM CHLORIDE: 526; 502; 368; 37; 30 INJECTION, SOLUTION INTRAVENOUS at 13:04

## 2024-07-30 RX ADMIN — PROPOFOL 120 MG: 10 INJECTION, EMULSION INTRAVENOUS at 08:43

## 2024-07-30 RX ADMIN — Medication 20 MG: at 10:21

## 2024-07-30 RX ADMIN — Medication 3 G: at 08:39

## 2024-07-30 RX ADMIN — LIDOCAINE HYDROCHLORIDE 100 MG: 20 INJECTION, SOLUTION INFILTRATION; PERINEURAL at 08:43

## 2024-07-30 RX ADMIN — SODIUM CHLORIDE, SODIUM GLUCONATE, SODIUM ACETATE, POTASSIUM CHLORIDE AND MAGNESIUM CHLORIDE: 526; 502; 368; 37; 30 INJECTION, SOLUTION INTRAVENOUS at 08:59

## 2024-07-30 RX ADMIN — FENTANYL CITRATE 150 MCG: 50 INJECTION INTRAMUSCULAR; INTRAVENOUS at 14:08

## 2024-07-30 RX ADMIN — CHLORHEXIDINE GLUCONATE 0.12% ORAL RINSE 10 ML: 1.2 LIQUID ORAL at 06:46

## 2024-07-30 RX ADMIN — ACETAMINOPHEN 975 MG: 325 TABLET, FILM COATED ORAL at 06:44

## 2024-07-30 RX ADMIN — FENTANYL CITRATE 250 MCG: 50 INJECTION INTRAMUSCULAR; INTRAVENOUS at 10:04

## 2024-07-30 RX ADMIN — HYDROMORPHONE HYDROCHLORIDE 0.4 MG: 0.2 INJECTION, SOLUTION INTRAMUSCULAR; INTRAVENOUS; SUBCUTANEOUS at 18:37

## 2024-07-30 RX ADMIN — FENTANYL CITRATE 100 MCG: 50 INJECTION INTRAMUSCULAR; INTRAVENOUS at 14:10

## 2024-07-30 RX ADMIN — Medication 200 MG: at 16:20

## 2024-07-30 RX ADMIN — FAMOTIDINE 20 MG: 20 TABLET ORAL at 06:45

## 2024-07-30 RX ADMIN — SENNOSIDES AND DOCUSATE SODIUM 1 TABLET: 8.6; 5 TABLET ORAL at 21:54

## 2024-07-30 RX ADMIN — Medication 150 MG: at 08:43

## 2024-07-30 RX ADMIN — GABAPENTIN 100 MG: 100 CAPSULE ORAL at 06:45

## 2024-07-30 RX ADMIN — INSULIN HUMAN 2 UNITS/HR: 1 INJECTION, SOLUTION INTRAVENOUS at 09:14

## 2024-07-30 RX ADMIN — EPINEPHRINE 0.04 MCG/KG/MIN: 1 INJECTION INTRAMUSCULAR; INTRAVENOUS; SUBCUTANEOUS at 13:59

## 2024-07-30 RX ADMIN — PROPOFOL 50 MCG/KG/MIN: 10 INJECTION, EMULSION INTRAVENOUS at 17:32

## 2024-07-30 RX ADMIN — Medication 3 G: at 12:48

## 2024-07-30 RX ADMIN — PHENYLEPHRINE HYDROCHLORIDE 100 MCG: 10 INJECTION INTRAVENOUS at 08:43

## 2024-07-30 RX ADMIN — METHOCARBAMOL 500 MG: 500 TABLET ORAL at 21:54

## 2024-07-30 RX ADMIN — PHENYLEPHRINE HYDROCHLORIDE 100 MCG: 10 INJECTION INTRAVENOUS at 09:16

## 2024-07-30 RX ADMIN — NOREPINEPHRINE BITARTRATE 3.2 MCG: 1 INJECTION, SOLUTION, CONCENTRATE INTRAVENOUS at 12:54

## 2024-07-30 RX ADMIN — OXYCODONE HYDROCHLORIDE 5 MG: 5 TABLET ORAL at 21:54

## 2024-07-30 RX ADMIN — CEFAZOLIN SODIUM 2 G: 2 INJECTION, SOLUTION INTRAVENOUS at 20:57

## 2024-07-30 RX ADMIN — CALCIUM CHLORIDE INJECTION 500 MG: 100 INJECTION, SOLUTION INTRAVENOUS at 13:51

## 2024-07-30 RX ADMIN — PROTAMINE SULFATE 300 MG: 10 INJECTION, SOLUTION INTRAVENOUS at 14:10

## 2024-07-30 RX ADMIN — Medication 30 MG: at 11:57

## 2024-07-30 RX ADMIN — PROPOFOL 30 MCG/KG/MIN: 10 INJECTION, EMULSION INTRAVENOUS at 15:34

## 2024-07-30 RX ADMIN — CALCIUM CHLORIDE INJECTION 300 MG: 100 INJECTION, SOLUTION INTRAVENOUS at 15:18

## 2024-07-30 RX ADMIN — LIDOCAINE HYDROCHLORIDE 2 ML: 20 INJECTION, SOLUTION INFILTRATION; PERINEURAL at 08:20

## 2024-07-30 RX ADMIN — METOPROLOL TARTRATE 12.5 MG: 25 TABLET, FILM COATED ORAL at 06:47

## 2024-07-30 RX ADMIN — ASPIRIN 81 MG CHEWABLE TABLET 162 MG: 81 TABLET CHEWABLE at 06:44

## 2024-07-30 RX ADMIN — Medication 5 G: at 09:01

## 2024-07-30 RX ADMIN — HYDROMORPHONE HYDROCHLORIDE 1 MG: 1 INJECTION, SOLUTION INTRAMUSCULAR; INTRAVENOUS; SUBCUTANEOUS at 15:10

## 2024-07-30 RX ADMIN — NOREPINEPHRINE BITARTRATE 0.03 MCG/KG/MIN: 0.06 INJECTION, SOLUTION INTRAVENOUS at 09:17

## 2024-07-30 RX ADMIN — SODIUM CHLORIDE 1.25 G/HR: 900 INJECTION, SOLUTION INTRAVENOUS at 09:58

## 2024-07-30 RX ADMIN — HEPARIN SODIUM 40000 UNITS: 1000 INJECTION INTRAVENOUS; SUBCUTANEOUS at 11:41

## 2024-07-30 RX ADMIN — FENTANYL CITRATE 50 MCG: 50 INJECTION INTRAMUSCULAR; INTRAVENOUS at 08:37

## 2024-07-30 RX ADMIN — ACETAMINOPHEN 975 MG: 325 TABLET, FILM COATED ORAL at 21:54

## 2024-07-30 RX ADMIN — SODIUM CHLORIDE, SODIUM GLUCONATE, SODIUM ACETATE, POTASSIUM CHLORIDE AND MAGNESIUM CHLORIDE: 526; 502; 368; 37; 30 INJECTION, SOLUTION INTRAVENOUS at 13:43

## 2024-07-30 RX ADMIN — PHENYLEPHRINE HYDROCHLORIDE 100 MCG: 10 INJECTION INTRAVENOUS at 08:55

## 2024-07-30 RX ADMIN — FENTANYL CITRATE 150 MCG: 50 INJECTION INTRAMUSCULAR; INTRAVENOUS at 08:41

## 2024-07-30 RX ADMIN — SODIUM CHLORIDE, POTASSIUM CHLORIDE, SODIUM LACTATE AND CALCIUM CHLORIDE: 600; 310; 30; 20 INJECTION, SOLUTION INTRAVENOUS at 09:01

## 2024-07-30 RX ADMIN — FENTANYL CITRATE 50 MCG: 50 INJECTION INTRAMUSCULAR; INTRAVENOUS at 08:34

## 2024-07-30 RX ADMIN — HYDROMORPHONE HYDROCHLORIDE 0.4 MG: 0.2 INJECTION, SOLUTION INTRAMUSCULAR; INTRAVENOUS; SUBCUTANEOUS at 21:06

## 2024-07-30 ASSESSMENT — ACTIVITIES OF DAILY LIVING (ADL)
ADLS_ACUITY_SCORE: 35
ADLS_ACUITY_SCORE: 39
ADLS_ACUITY_SCORE: 35
ADLS_ACUITY_SCORE: 41
ADLS_ACUITY_SCORE: 35
ADLS_ACUITY_SCORE: 41
ADLS_ACUITY_SCORE: 35

## 2024-07-30 ASSESSMENT — ENCOUNTER SYMPTOMS: DYSRHYTHMIAS: 1

## 2024-07-30 NOTE — ANESTHESIA PROCEDURE NOTES
Arterial Line Procedure Note    Pre-Procedure   Staff -        Anesthesiologist:  Shahriar Wise MD       Resident/Fellow: Jaren De Leon MD       Performed By: resident and with residents       Procedure performed by resident/fellow/CRNA in presence of a teaching physician.         Location: OR       Pre-Anesthestic Checklist: patient identified, IV checked, risks and benefits discussed, informed consent, monitors and equipment checked, pre-op evaluation and at physician/surgeon's request  Timeout:       Correct Patient: Yes        Correct Procedure: Yes        Correct Site: Yes        Correct Position: Yes   Line Placement:   This line was placed Pre Induction starting at 7/30/2024 8:20 AM and ending at 7/30/2024 8:30 AM  Procedure   Procedure: arterial line and elective       Diagnosis: Blood Pressure Monitoring and/or Frequent Lab Draws       Laterality: left       Insertion Site: radial.  Sterile Prep        Standard elements of sterile barrier followed       Skin prep: Chloraprep  Insertion/Injection        Technique: Tomas's test completed, Seldinger Technique and ultrasound guided        1. Ultrasound was used to evaluate the access site.       2. Artery evaluated via ultrasound for patency/adequacy.       3. Using real-time ultrasound the needle/catheter was observed entering the artery/vein.       Catheter Type/Size: 20 G, 12 cm  Narrative        Tegaderm dressing used.       Complications: None apparent,        Arterial waveform: Yes        IBP within 10% of NIBP: Yes   Comments:  Routine arterial line placement without complications.

## 2024-07-30 NOTE — PROGRESS NOTES
Critical Care Attending Progress Note  I, Jeimy Mackenzie MD, PhD saw this patient with the resident and agree with the findings and plan of care as documented in the note. Please see separate note from today for further documentation.     I personally reviewed vital signs, medications, labs and imaging.     Assessment:   Patient is is s/p coronary angioplasty w/ stent placement of the circumflex coronary artery in 2002. He endorses significant GALLARDO. A cath on 5/22/24 demonstrated 70% occulsion of the proximal LAD, 60% distal LAD, 30% proximal LCx, 30% distal LCx, 90 % pRCA. EF 60%. 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   I personally managed the ventilator, hemodynamics, sedation, analgesia, metabolic abnormalities and nutritional status.    I agree with the assessment and plan. I spent 52 minutes exclusive of procedures evaluating and managing this patient, discussing with the consultants, and updating the patient and family.     Jeimy Mackenzie MD, PhD

## 2024-07-30 NOTE — BRIEF OP NOTE
[facility]    Brief Operative Note    Pre-operative diagnosis: Coronary artery disease involving native coronary artery of native heart, unspecified whether angina present [I25.10]  Post-operative diagnosis Same as pre-operative diagnosis    Procedure: CORONARY ARTERY BYPASS GRAFT x2 (LIMA-LAD, SVG-LPL), Pulmonary vein isolation with RF ablation, LIGATION, LEFT ATRIAL APPENDAGE, sternal closure with plates and wires    Surgeon: Surgeons and Role:     * Deniz Wilson MD - Primary     * Krysten Cruz MD - Assisting     * Vikki Juares PA-C - Assisting     * Blayne Hammond MD - Fellow - Assisting  Anesthesia: General   Estimated Blood Loss: 1000 ml    Drains: Two mediastinal, one left pleural chest tubes  Specimens: * No specimens in log *  Findings:   See operative report .  Complications: None.  Implants:   Implant Name Type Inv. Item Serial No.  Lot No. LRB No. Used Action   IMP ATRICLIP FLEX V DEVICE STACY EXLUSION 40MM [id number] - [id number] Clip IMP ATRICLIP FLEX V DEVICE STACY EXLUSION 40MM [id number]  [employer] [id number] N/A 1 Implanted   SternaLock box plate 4 hole core plate    BECCA NONE N/A 1 Implanted   Core Plate H Plate 6 hole    BECCA NONE N/A 2 Implanted   Core Plate O Plate 6 hole    BECCA NONE N/A 1 Implanted   3.5mm self locking screw    BECCA NONE N/A 9 Implanted   3.5mm self locking screw    BECCA NONE N/A 1 Implanted and Explanted

## 2024-07-30 NOTE — ANESTHESIA PROCEDURE NOTES
Central Line/PA Catheter Placement    Pre-Procedure   Staff -        Anesthesiologist:  Shahriar Wise MD       Resident/Fellow: Tyrone Barrientos DO       Performed By: resident and with residents       Procedure performed by resident/fellow/CRNA in presence of a teaching physician.         Location: OR       Pre-Anesthestic Checklist: patient identified, IV checked, site marked, risks and benefits discussed, informed consent, monitors and equipment checked, pre-op evaluation and at physician/surgeon's request  Timeout:       Correct Patient: Yes        Correct Procedure: Yes        Correct Site: Yes        Correct Position: Yes        Correct Laterality: Yes   Line Placement:   This line was placed Post Induction    Procedure   Procedure: central line       Laterality: right       Insertion Site: internal jugular.       Patient Position: Trendelenburg  Sterile Prep        All elements of maximal sterile barrier technique followed       Patient Prep/Sterile Barriers: draped, hand hygiene, gloves , hat , mask , draped, gown, sterile gel and probe cover       Skin prep: Chloraprep  Insertion/Injection        Technique: ultrasound guided and Seldinger Technique        1. Ultrasound was used to evaluate the access site.       2. Vein evaluated via ultrasound for patency/adequacy.       3. Using real-time ultrasound the needle/catheter was observed entering the artery/vein.       Introducer Type: 9 Fr, 2-lumen MAC        Type: Introducer  Narrative         Secured by: suture and anchor securement device       Tegaderm and Biopatch dressing used.       Complications: None apparent,        blood aspirated from all lumens,        All lumens flushed: Yes       Verification method: Ultrasound, Placement to be verified post-op and RAPHAEL   Comments:  Routine MAC (multi-lumen access cannula) central venous catheter placement without complications. SLIC placed through introducer

## 2024-07-30 NOTE — ANESTHESIA POSTPROCEDURE EVALUATION
Patient: Jorje Hutchinson    Procedure: Procedure(s):  CORONARY ARTERY BYPASS GRAFT  WINTER MAZE PROCEDURE  LIGATION, LEFT ATRIAL APPENDAGE, OPEN **LATEX ALLERGY**       Anesthesia Type:  General    Note:  Disposition: ICU            ICU Sign Out: Anesthesiologist/ICU physician sign out WAS performed   Postop Pain Control:    PONV:    Neuro/Psych:    Airway/Respiratory: Uneventful            Sign Out: AIRWAY IN SITU/Resp. Support               Airway in situ/Resp. Support: ETT                 Reason: Planned Pre-op   CV/Hemodynamics: Uneventful            Sign Out: Acceptable CV status; No obvious hypovolemia; No obvious fluid overload   Other NRE: NONE   DID A NON-ROUTINE EVENT OCCUR? No    Event details/Postop Comments:  Intubated, sedated           Last vitals:  Vitals:    07/30/24 1800 07/30/24 1815 07/30/24 1840   BP:      Pulse: 80 80 80   Resp: 14 14    Temp:      SpO2: 100% 100% 100%       Electronically Signed By: Shahriar Wise MD  July 30, 2024  6:59 PM

## 2024-07-30 NOTE — ANESTHESIA PROCEDURE NOTES
Airway       Patient location during procedure: OR       Procedure Start/Stop Times: 7/30/2024 8:47 AM  Staff -        Resident/Fellow: Jaren De Leon MD       Performed By: resident  Consent for Airway        Urgency: elective  Indications and Patient Condition       Indications for airway management: barry-procedural and airway protection       Induction type:intravenous       Mask difficulty assessment: 2 - vent by mask + OA or adjuvant +/- NMBA    Final Airway Details       Final airway type: endotracheal airway       Successful airway: ETT - single  Endotracheal Airway Details        ETT size (mm): 8.0       Cuffed: yes       Successful intubation technique: direct laryngoscopy       DL Blade Type: MAC 4       Grade View of Cords: 2       Adjucts: stylet       Position: Right       Measured from: gums/teeth       Secured at (cm): 23       Bite block used: None    Post intubation assessment        Placement verified by: capnometry, equal breath sounds and chest rise        Number of attempts at approach: 1       Number of other approaches attempted: 0       Secured with: pink tape       Ease of procedure: easy       Dentition: Unchanged and Intact    Medication(s) Administered   Medication Administration Time: 7/30/2024 8:47 AM

## 2024-07-30 NOTE — ANESTHESIA CARE TRANSFER NOTE
Patient: Jorje Hutchinson    Procedure: Procedure(s):  CORONARY ARTERY BYPASS GRAFT  WINTER MAZE PROCEDURE  LIGATION, LEFT ATRIAL APPENDAGE, OPEN **LATEX ALLERGY**       Diagnosis: Coronary artery disease involving native coronary artery of native heart, unspecified whether angina present [I25.10]  Diagnosis Additional Information: No value filed.    Anesthesia Type:   General     Note:    Oropharynx: endotracheal tube in place and ventilatory support  Level of Consciousness: iatrogenic sedation    Level of Supplemental Oxygen (L/min / FiO2): 15  Independent Airway: airway patency satisfactory and stable  Dentition: dentition unchanged  Vital Signs Stable: post-procedure vital signs reviewed and stable  Report to RN Given: handoff report given  Patient transferred to: ICU    ICU Handoff: Call for PAUSE to initiate/utilize ICU HANDOFF, Identified Patient, Identified Responsible Provider, Reviewed the Pertinent Medical History, Discussed Surgical Course, Reviewed Intra-OP Anesthesia Management and Issues during Anesthesia, Set Expectations for Post Procedure Period and Allowed Opportunity for Questions and Acknowledgement of Understanding      Vitals:  Vitals Value Taken Time   /62    Temp     Pulse 80 07/30/24 1657   Resp     SpO2 100 % 07/30/24 1657   Vitals shown include unfiled device data.    Electronically Signed By: Jaren De Leon MD  July 30, 2024  4:58 PM

## 2024-07-30 NOTE — ANESTHESIA PROCEDURE NOTES
Perioperative RAPHAEL Procedure Note    Staff -        Resident/Fellow: Tyrone Barrientos DO       Performed By: fellow  Preanesthesia Checklist:  Patient identified, IV assessed, risks and benefits discussed, monitors and equipment assessed, procedure being performed at surgeon's request and anesthesia consent obtained.    RAPHAEL Probe Insertion    Probe Status PRE Insertion: NO obvious damage  Probe type:  Adult 3D  Bite block used:   Oral Airway  Insertion Technique: Jaw Lift  Insertion complications: None obvious  Billing Report:RAPHAEL report by Anesthesiologist (See Separate Report note)  Probe Status POST Removal: NO obvious damage    RAPHAEL Report  General Procedure Information  Images for this study have been archived.  Modalities: Color flow mapping, PW Doppler, CW Doppler, 3D and 2D  Diagnostic indications for RAPHAEL:   Cardiomyopathy, other  Echocardiographic and Doppler Measurements  Right Ventricle:  Cavity size normal.    Hypertrophy not present.   Thrombus not present.    Global function normal.     Left Ventricle:  Cavity size normal.    Hypertrophy not present.   Thrombus not present.   Global Function normal.       Ventricular Regional Function:  1- Basal Anteroseptal:  normal  2- Basal Anterior:  normal  3- Basal Anterolateral:  normal  4- Basal Inferolateral:  normal  5- Basal Inferior:  normal  6- Basal Inferoseptal:  normal  7- Mid Anteroseptal:  normal  8- Mid Anterior:  normal  9- Mid Anterolateral:  normal  10- Mid Inferolateral:  normal  11- Mid Inferior:  normal  12- Mid Inferoseptal:  normal  13- Apical Anterior:  normal  14- Apical Lateral:  normal  15- Apical Inferior:  normal  16- Apical Septal:  normal  17- Elliott:  normal    Valves  Aortic Valve: Annulus normal.  Stenosis not present.  Regurgitation absent.  Leaflets normal.  Leaflet motions normal.    Mitral Valve: Annulus normal.  Stenosis not present.  Regurgitation +1.  Leaflets normal.  Leaflet motions normal.    Tricuspid Valve: Annulus normal.   Stenosis not present.  Regurgitation absent.  Leaflets normal.  Leaflet motions normal.        Aorta: Ascending Aorta: Size normal.  Dissection not present.  Plaque thickness less than 3 mm.  Mobile plaque not present.    Aortic Arch: Size normal.    Dissection not present.   Plaque thickness less than 3 mm.   Mobile plaque not present.    Descending Aorta: Size normal.    Dissection not present.   Plaque thickness less than 3 mm.   Mobile plaque not present.      Right Atrium:  Size normal.   Spontaneous echo contrast not present.   Thrombus not present.   Tumor not present.   Device not present.     Left Atrium: Size normal.  Spontaneous echo contrast not present.  Thrombus not present.  Tumor not present.  Device not present.    Left atrial appendage normal.     Atrial Septum: Intra-atrial septal morphology normal.       Ventricular Septum: Intra-ventricular septum morphology normal.        Other Findings:   Pericardium:  normal. Pleural Effusion:  none. Pulmonary Arteries:  normal. Pulmonary Venous Flow:  normal. Cornoary sinus catheter present. Coronary sinus size (mm):  9.2.   Post Intervention Findings  Procedure(s) performed:  CABG. Global function:  Unchanged. Regional wall motion: Unchanged. Surgeon(s) notified of all postintervention findings: Yes.                 Echocardiogram Comments  Echocardiogram comments:   Pre-CPB:  Grossly normal LV size with preserved systolic function and EF 62% by biplane assessment. Normal LV diastolic function by E/A 1.4 and lateral e' 10.8 cm/s. Grossly normal RV size with no evidence of systolic dysfunction by TAPSE 25 mm. STACY velocities > 40 cm/s with no visualization of SEC or thrombus via 2D views and 3D assessment. No PFO by CFD. Trace MR. Trileaflet aortic valve without significant regurgitation or stenosis. No pleural or pericardial effusion. No echo evidence of aortic dissection. Findings communicated with surgical team in real time.    Post-CPB:  Globally  unchanged biventricular size and function. No new RWMA. MR trace-mild by visual appearance and 2D PISA EROA 0.06 cm^2. No significant pleural effusion. No echo evidence of aortic dissection. Findings communicated with surgical team in real time..

## 2024-07-31 ENCOUNTER — APPOINTMENT (OUTPATIENT)
Dept: GENERAL RADIOLOGY | Facility: CLINIC | Age: 74
DRG: 233 | End: 2024-07-31
Attending: SURGERY
Payer: MEDICARE

## 2024-07-31 ENCOUNTER — APPOINTMENT (OUTPATIENT)
Dept: OCCUPATIONAL THERAPY | Facility: CLINIC | Age: 74
DRG: 233 | End: 2024-07-31
Attending: SURGERY
Payer: MEDICARE

## 2024-07-31 ENCOUNTER — APPOINTMENT (OUTPATIENT)
Dept: PHYSICAL THERAPY | Facility: CLINIC | Age: 74
DRG: 233 | End: 2024-07-31
Attending: SURGERY
Payer: MEDICARE

## 2024-07-31 LAB
ACANTHOCYTES BLD QL SMEAR: ABNORMAL
ALBUMIN SERPL BCG-MCNC: 3.3 G/DL (ref 3.5–5.2)
ALBUMIN SERPL BCG-MCNC: 3.4 G/DL (ref 3.5–5.2)
ALLEN'S TEST: ABNORMAL
ALP SERPL-CCNC: 73 U/L (ref 40–150)
ALP SERPL-CCNC: 82 U/L (ref 40–150)
ALT SERPL W P-5'-P-CCNC: 8 U/L (ref 0–70)
ALT SERPL W P-5'-P-CCNC: 9 U/L (ref 0–70)
ANION GAP SERPL CALCULATED.3IONS-SCNC: 10 MMOL/L (ref 7–15)
ANION GAP SERPL CALCULATED.3IONS-SCNC: 12 MMOL/L (ref 7–15)
APTT PPP: 28 SECONDS (ref 22–38)
APTT PPP: 31 SECONDS (ref 22–38)
AST SERPL W P-5'-P-CCNC: 51 U/L (ref 0–45)
AST SERPL W P-5'-P-CCNC: 53 U/L (ref 0–45)
AUER BODIES BLD QL SMEAR: ABNORMAL
BASE EXCESS BLDA CALC-SCNC: -2.2 MMOL/L (ref -3–3)
BASO STIPL BLD QL SMEAR: ABNORMAL
BILIRUB DIRECT SERPL-MCNC: 0.21 MG/DL (ref 0–0.3)
BILIRUB DIRECT SERPL-MCNC: <0.2 MG/DL (ref 0–0.3)
BILIRUB SERPL-MCNC: 0.4 MG/DL
BILIRUB SERPL-MCNC: 0.4 MG/DL
BITE CELLS BLD QL SMEAR: ABNORMAL
BLISTER CELLS BLD QL SMEAR: ABNORMAL
BUN SERPL-MCNC: 30.9 MG/DL (ref 8–23)
BUN SERPL-MCNC: 32.8 MG/DL (ref 8–23)
BURR CELLS BLD QL SMEAR: ABNORMAL
CA-I BLD-MCNC: 4.8 MG/DL (ref 4.4–5.2)
CA-I BLD-MCNC: 4.8 MG/DL (ref 4.4–5.2)
CALCIUM SERPL-MCNC: 8.7 MG/DL (ref 8.8–10.4)
CALCIUM SERPL-MCNC: 8.8 MG/DL (ref 8.8–10.4)
CHLORIDE SERPL-SCNC: 101 MMOL/L (ref 98–107)
CHLORIDE SERPL-SCNC: 105 MMOL/L (ref 98–107)
COHGB MFR BLD: >100 % (ref 95–96)
CREAT SERPL-MCNC: 1.46 MG/DL (ref 0.67–1.17)
CREAT SERPL-MCNC: 1.58 MG/DL (ref 0.67–1.17)
DACRYOCYTES BLD QL SMEAR: ABNORMAL
EGFRCR SERPLBLD CKD-EPI 2021: 45 ML/MIN/1.73M2
EGFRCR SERPLBLD CKD-EPI 2021: 50 ML/MIN/1.73M2
ELLIPTOCYTES BLD QL SMEAR: ABNORMAL
ERYTHROCYTE [DISTWIDTH] IN BLOOD BY AUTOMATED COUNT: 19.2 % (ref 10–15)
ERYTHROCYTE [DISTWIDTH] IN BLOOD BY AUTOMATED COUNT: 19.8 % (ref 10–15)
FIBRINOGEN PPP-MCNC: 362 MG/DL (ref 170–510)
FIBRINOGEN PPP-MCNC: 474 MG/DL (ref 170–510)
FRAGMENTS BLD QL SMEAR: ABNORMAL
GLUCOSE BLDC GLUCOMTR-MCNC: 112 MG/DL (ref 70–99)
GLUCOSE BLDC GLUCOMTR-MCNC: 115 MG/DL (ref 70–99)
GLUCOSE BLDC GLUCOMTR-MCNC: 120 MG/DL (ref 70–99)
GLUCOSE BLDC GLUCOMTR-MCNC: 125 MG/DL (ref 70–99)
GLUCOSE BLDC GLUCOMTR-MCNC: 127 MG/DL (ref 70–99)
GLUCOSE BLDC GLUCOMTR-MCNC: 132 MG/DL (ref 70–99)
GLUCOSE BLDC GLUCOMTR-MCNC: 150 MG/DL (ref 70–99)
GLUCOSE BLDC GLUCOMTR-MCNC: 153 MG/DL (ref 70–99)
GLUCOSE BLDC GLUCOMTR-MCNC: 155 MG/DL (ref 70–99)
GLUCOSE BLDC GLUCOMTR-MCNC: 161 MG/DL (ref 70–99)
GLUCOSE BLDC GLUCOMTR-MCNC: 177 MG/DL (ref 70–99)
GLUCOSE BLDC GLUCOMTR-MCNC: 188 MG/DL (ref 70–99)
GLUCOSE BLDC GLUCOMTR-MCNC: 209 MG/DL (ref 70–99)
GLUCOSE SERPL-MCNC: 129 MG/DL (ref 70–99)
GLUCOSE SERPL-MCNC: 153 MG/DL (ref 70–99)
HCO3 BLD-SCNC: 24 MMOL/L (ref 21–28)
HCO3 SERPL-SCNC: 20 MMOL/L (ref 22–29)
HCO3 SERPL-SCNC: 22 MMOL/L (ref 22–29)
HCT VFR BLD AUTO: 26.9 % (ref 40–53)
HCT VFR BLD AUTO: 28.2 % (ref 40–53)
HGB BLD-MCNC: 8.6 G/DL (ref 13.3–17.7)
HGB BLD-MCNC: 9.2 G/DL (ref 13.3–17.7)
HGB C CRYSTALS: ABNORMAL
HOWELL-JOLLY BOD BLD QL SMEAR: ABNORMAL
INR PPP: 1.27 (ref 0.85–1.15)
INR PPP: 1.32 (ref 0.85–1.15)
LACTATE SERPL-SCNC: 0.8 MMOL/L (ref 0.7–2)
LACTATE SERPL-SCNC: 1.4 MMOL/L (ref 0.7–2)
MAGNESIUM SERPL-MCNC: 2.5 MG/DL (ref 1.7–2.3)
MAGNESIUM SERPL-MCNC: 2.6 MG/DL (ref 1.7–2.3)
MCH RBC QN AUTO: 29.1 PG (ref 26.5–33)
MCH RBC QN AUTO: 29.3 PG (ref 26.5–33)
MCHC RBC AUTO-ENTMCNC: 32 G/DL (ref 31.5–36.5)
MCHC RBC AUTO-ENTMCNC: 32.6 G/DL (ref 31.5–36.5)
MCV RBC AUTO: 90 FL (ref 78–100)
MCV RBC AUTO: 91 FL (ref 78–100)
NEUTS HYPERSEG BLD QL SMEAR: ABNORMAL
O2/TOTAL GAS SETTING VFR VENT: 40 %
PCO2 BLD: 44 MM HG (ref 35–45)
PH BLD: 7.34 [PH] (ref 7.35–7.45)
PHOSPHATE SERPL-MCNC: 4.6 MG/DL (ref 2.5–4.5)
PHOSPHATE SERPL-MCNC: 4.7 MG/DL (ref 2.5–4.5)
PLAT MORPH BLD: ABNORMAL
PLATELET # BLD AUTO: 147 10E3/UL (ref 150–450)
PLATELET # BLD AUTO: 209 10E3/UL (ref 150–450)
PO2 BLD: 129 MM HG (ref 80–105)
POLYCHROMASIA BLD QL SMEAR: ABNORMAL
POTASSIUM SERPL-SCNC: 4.7 MMOL/L (ref 3.4–5.3)
POTASSIUM SERPL-SCNC: 4.8 MMOL/L (ref 3.4–5.3)
PROT SERPL-MCNC: 5.2 G/DL (ref 6.4–8.3)
PROT SERPL-MCNC: 5.4 G/DL (ref 6.4–8.3)
RBC # BLD AUTO: 2.96 10E6/UL (ref 4.4–5.9)
RBC # BLD AUTO: 3.14 10E6/UL (ref 4.4–5.9)
RBC AGGLUT BLD QL: ABNORMAL
RBC MORPH BLD: ABNORMAL
ROULEAUX BLD QL SMEAR: ABNORMAL
SAO2 % BLDA: 97 % (ref 92–100)
SICKLE CELLS BLD QL SMEAR: ABNORMAL
SMUDGE CELLS BLD QL SMEAR: ABNORMAL
SODIUM SERPL-SCNC: 133 MMOL/L (ref 135–145)
SODIUM SERPL-SCNC: 137 MMOL/L (ref 135–145)
SPHEROCYTES BLD QL SMEAR: ABNORMAL
STOMATOCYTES BLD QL SMEAR: ABNORMAL
TARGETS BLD QL SMEAR: SLIGHT
TOXIC GRANULES BLD QL SMEAR: ABNORMAL
VARIANT LYMPHS BLD QL SMEAR: ABNORMAL
WBC # BLD AUTO: 11.6 10E3/UL (ref 4–11)
WBC # BLD AUTO: 15.5 10E3/UL (ref 4–11)

## 2024-07-31 PROCEDURE — 82374 ASSAY BLOOD CARBON DIOXIDE: CPT

## 2024-07-31 PROCEDURE — 250N000013 HC RX MED GY IP 250 OP 250 PS 637: Performed by: PHYSICIAN ASSISTANT

## 2024-07-31 PROCEDURE — 85610 PROTHROMBIN TIME: CPT

## 2024-07-31 PROCEDURE — 84100 ASSAY OF PHOSPHORUS: CPT

## 2024-07-31 PROCEDURE — 250N000013 HC RX MED GY IP 250 OP 250 PS 637: Performed by: SURGERY

## 2024-07-31 PROCEDURE — 83735 ASSAY OF MAGNESIUM: CPT

## 2024-07-31 PROCEDURE — 97530 THERAPEUTIC ACTIVITIES: CPT | Mod: GO

## 2024-07-31 PROCEDURE — 82248 BILIRUBIN DIRECT: CPT

## 2024-07-31 PROCEDURE — 93005 ELECTROCARDIOGRAM TRACING: CPT

## 2024-07-31 PROCEDURE — 250N000009 HC RX 250: Performed by: SURGERY

## 2024-07-31 PROCEDURE — 258N000003 HC RX IP 258 OP 636: Performed by: SURGERY

## 2024-07-31 PROCEDURE — 97161 PT EVAL LOW COMPLEX 20 MIN: CPT | Mod: GP | Performed by: REHABILITATION PRACTITIONER

## 2024-07-31 PROCEDURE — 97530 THERAPEUTIC ACTIVITIES: CPT | Mod: GP | Performed by: REHABILITATION PRACTITIONER

## 2024-07-31 PROCEDURE — 85384 FIBRINOGEN ACTIVITY: CPT

## 2024-07-31 PROCEDURE — 83605 ASSAY OF LACTIC ACID: CPT

## 2024-07-31 PROCEDURE — 97535 SELF CARE MNGMENT TRAINING: CPT | Mod: GO

## 2024-07-31 PROCEDURE — 80053 COMPREHEN METABOLIC PANEL: CPT

## 2024-07-31 PROCEDURE — 82805 BLOOD GASES W/O2 SATURATION: CPT | Performed by: SURGERY

## 2024-07-31 PROCEDURE — 250N000012 HC RX MED GY IP 250 OP 636 PS 637: Performed by: SURGERY

## 2024-07-31 PROCEDURE — 85014 HEMATOCRIT: CPT

## 2024-07-31 PROCEDURE — 250N000012 HC RX MED GY IP 250 OP 636 PS 637: Performed by: PHYSICIAN ASSISTANT

## 2024-07-31 PROCEDURE — 71045 X-RAY EXAM CHEST 1 VIEW: CPT | Mod: 26 | Performed by: RADIOLOGY

## 2024-07-31 PROCEDURE — 82330 ASSAY OF CALCIUM: CPT

## 2024-07-31 PROCEDURE — 80048 BASIC METABOLIC PNL TOTAL CA: CPT

## 2024-07-31 PROCEDURE — 85730 THROMBOPLASTIN TIME PARTIAL: CPT

## 2024-07-31 PROCEDURE — 250N000011 HC RX IP 250 OP 636

## 2024-07-31 PROCEDURE — 85027 COMPLETE CBC AUTOMATED: CPT

## 2024-07-31 PROCEDURE — 71045 X-RAY EXAM CHEST 1 VIEW: CPT

## 2024-07-31 PROCEDURE — 250N000011 HC RX IP 250 OP 636: Mod: JZ | Performed by: SURGERY

## 2024-07-31 PROCEDURE — 99291 CRITICAL CARE FIRST HOUR: CPT | Mod: 24 | Performed by: ANESTHESIOLOGY

## 2024-07-31 PROCEDURE — 200N000002 HC R&B ICU UMMC

## 2024-07-31 PROCEDURE — 250N000011 HC RX IP 250 OP 636: Performed by: SURGERY

## 2024-07-31 PROCEDURE — 97165 OT EVAL LOW COMPLEX 30 MIN: CPT | Mod: GO

## 2024-07-31 RX ORDER — ROSUVASTATIN CALCIUM 20 MG/1
20 TABLET, COATED ORAL DAILY
Status: DISCONTINUED | OUTPATIENT
Start: 2024-07-31 | End: 2024-08-10 | Stop reason: HOSPADM

## 2024-07-31 RX ADMIN — OXYCODONE HYDROCHLORIDE 10 MG: 10 TABLET ORAL at 01:54

## 2024-07-31 RX ADMIN — HEPARIN SODIUM 5000 UNITS: 5000 INJECTION, SOLUTION INTRAVENOUS; SUBCUTANEOUS at 11:55

## 2024-07-31 RX ADMIN — SENNOSIDES AND DOCUSATE SODIUM 1 TABLET: 8.6; 5 TABLET ORAL at 08:07

## 2024-07-31 RX ADMIN — INSULIN ASPART 2 UNITS: 100 INJECTION, SOLUTION INTRAVENOUS; SUBCUTANEOUS at 12:57

## 2024-07-31 RX ADMIN — OXYCODONE HYDROCHLORIDE 10 MG: 10 TABLET ORAL at 05:59

## 2024-07-31 RX ADMIN — ACETAMINOPHEN 975 MG: 325 TABLET, FILM COATED ORAL at 20:52

## 2024-07-31 RX ADMIN — PANTOPRAZOLE SODIUM 40 MG: 40 INJECTION, POWDER, FOR SOLUTION INTRAVENOUS at 08:11

## 2024-07-31 RX ADMIN — OXYCODONE HYDROCHLORIDE 10 MG: 10 TABLET ORAL at 20:53

## 2024-07-31 RX ADMIN — ACETAMINOPHEN 975 MG: 325 TABLET, FILM COATED ORAL at 05:59

## 2024-07-31 RX ADMIN — ROSUVASTATIN CALCIUM 20 MG: 20 TABLET, FILM COATED ORAL at 11:57

## 2024-07-31 RX ADMIN — HEPARIN SODIUM 5000 UNITS: 5000 INJECTION, SOLUTION INTRAVENOUS; SUBCUTANEOUS at 20:53

## 2024-07-31 RX ADMIN — METHOCARBAMOL 500 MG: 500 TABLET ORAL at 13:32

## 2024-07-31 RX ADMIN — HYDROMORPHONE HYDROCHLORIDE 0.4 MG: 0.2 INJECTION, SOLUTION INTRAMUSCULAR; INTRAVENOUS; SUBCUTANEOUS at 00:13

## 2024-07-31 RX ADMIN — INSULIN ASPART 2 UNITS: 100 INJECTION, SOLUTION INTRAVENOUS; SUBCUTANEOUS at 18:15

## 2024-07-31 RX ADMIN — ASPIRIN 81 MG CHEWABLE TABLET 162 MG: 81 TABLET CHEWABLE at 08:35

## 2024-07-31 RX ADMIN — POLYETHYLENE GLYCOL 3350 17 G: 17 POWDER, FOR SOLUTION ORAL at 08:07

## 2024-07-31 RX ADMIN — HYDROMORPHONE HYDROCHLORIDE 0.4 MG: 0.2 INJECTION, SOLUTION INTRAMUSCULAR; INTRAVENOUS; SUBCUTANEOUS at 19:42

## 2024-07-31 RX ADMIN — SENNOSIDES AND DOCUSATE SODIUM 1 TABLET: 8.6; 5 TABLET ORAL at 20:53

## 2024-07-31 RX ADMIN — CEFAZOLIN SODIUM 2 G: 2 INJECTION, SOLUTION INTRAVENOUS at 05:10

## 2024-07-31 RX ADMIN — ACETAMINOPHEN 975 MG: 325 TABLET, FILM COATED ORAL at 13:31

## 2024-07-31 RX ADMIN — CEFAZOLIN SODIUM 2 G: 2 INJECTION, SOLUTION INTRAVENOUS at 12:57

## 2024-07-31 RX ADMIN — OXYCODONE HYDROCHLORIDE 10 MG: 10 TABLET ORAL at 10:28

## 2024-07-31 RX ADMIN — HYDROMORPHONE HYDROCHLORIDE 0.2 MG: 0.2 INJECTION, SOLUTION INTRAMUSCULAR; INTRAVENOUS; SUBCUTANEOUS at 08:09

## 2024-07-31 RX ADMIN — INSULIN HUMAN 2 UNITS/HR: 1 INJECTION, SOLUTION INTRAVENOUS at 05:59

## 2024-07-31 RX ADMIN — OXYCODONE HYDROCHLORIDE 10 MG: 10 TABLET ORAL at 16:02

## 2024-07-31 ASSESSMENT — ACTIVITIES OF DAILY LIVING (ADL)
FALL_HISTORY_WITHIN_LAST_SIX_MONTHS: NO
CONCENTRATING,_REMEMBERING_OR_MAKING_DECISIONS_DIFFICULTY: NO
WALKING_OR_CLIMBING_STAIRS_DIFFICULTY: YES
ADLS_ACUITY_SCORE: 41
WEAR_GLASSES_OR_BLIND: NO
DRESSING/BATHING: DRESSING DIFFICULTY, REQUIRES EQUIPMENT
TOILETING: 0-->INDEPENDENT
DOING_ERRANDS_INDEPENDENTLY_DIFFICULTY: NO
WALKING_OR_CLIMBING_STAIRS: STAIR CLIMBING DIFFICULTY, REQUIRES EQUIPMENT
ADLS_ACUITY_SCORE: 37
CHANGE_IN_FUNCTIONAL_STATUS_SINCE_ONSET_OF_CURRENT_ILLNESS/INJURY: NO
ADLS_ACUITY_SCORE: 41
ADLS_ACUITY_SCORE: 37
ADLS_ACUITY_SCORE: 41
ADLS_ACUITY_SCORE: 37
DRESSING/BATHING_DIFFICULTY: YES
ADLS_ACUITY_SCORE: 41
ADLS_ACUITY_SCORE: 41
TOILETING: 0-->INDEPENDENT
ADLS_ACUITY_SCORE: 37
ADLS_ACUITY_SCORE: 48
TOILETING_ISSUES: NO
DIFFICULTY_EATING/SWALLOWING: NO
ADLS_ACUITY_SCORE: 41
ADLS_ACUITY_SCORE: 41
ADLS_ACUITY_SCORE: 37
EQUIPMENT_CURRENTLY_USED_AT_HOME: WALKER, ROLLING
ADLS_ACUITY_SCORE: 37
ADLS_ACUITY_SCORE: 41
DESCRIBE_HEARING_LOSS: BILATERAL HEARING LOSS
DIFFICULTY_COMMUNICATING: NO
WERE_AUXILIARY_AIDS_OFFERED?: YES
PATIENT'S_PREFERRED_MEANS_OF_COMMUNICATION: VERBAL
ADLS_ACUITY_SCORE: 37
ADLS_ACUITY_SCORE: 37
ADLS_ACUITY_SCORE: 48
ADLS_ACUITY_SCORE: 48
HEARING_DIFFICULTY_OR_DEAF: YES

## 2024-07-31 NOTE — PLAN OF CARE
Oriented x4. 5mg oxycodone did not effectively treat pain, required IV dilaudid for breakthrough, 10mg oxycodone worked well for pain control (reports incisional pain and left ribcage pain). 100% V paced at 80 bpm. Levo remains on at low dose to meet MAP goal. Extubated at 1845 without any significant issues. ABG stable on current AVAPS settings, pt also has home CPAP in room. Taking in good PO fluids. - flatus, BS hypo. Denies N/V. Marginal but adequate output in house. No strikethrough drainage seen on any surgical dressings. CT output 10-30cc/hr. Up to chair this AM at 0645, tolerated well.

## 2024-07-31 NOTE — PROGRESS NOTES
07/31/24 1045   Appointment Info   Signing Clinician's Name / Credentials (OT) Alycia Marie OTR/L   Rehab Comments (OT) sternal   Living Environment   People in Home spouse   Current Living Arrangements house   Home Accessibility wheelchair accessible;other (see comments)  (ramped entrance)   Transportation Anticipated car, drives self   Living Environment Comments Pt reports living in house with spouse, ramped entrance, all needs met on main level, walk-in shower with built-in bench, second shower on lower level with ~3 inch lip.   Self-Care   Usual Activity Tolerance good   Current Activity Tolerance fair   Equipment Currently Used at Home walker, rolling   Fall history within last six months no   Activity/Exercise/Self-Care Comment Pt reports ADL IND at baseline, has access to 4WW as needed due to ongoing L knee pain. Per pt wife, 'pt had R knee done already and will need the L knee done.'   Instrumental Activities of Daily Living (IADL)   IADL Comments Pt wife completes most home mgmt tasks including laundry, cooking, etc, pt does not complete yardwork, drives self.   General Information   Onset of Illness/Injury or Date of Surgery 07/30/24   Referring Physician Blayne Hammond MD   Patient/Family Therapy Goal Statement (OT) Return home and to PLOF   Additional Occupational Profile Info/Pertinent History of Current Problem Per EMR, Jorje Hutchinson is a 74 year old male with PMH of CAD sp coronary angioplasty w/stent placement of circumflex coronary artery in 2002, HTN, HLD, SVT, HERRERA, CKD, T2DM, OA, prostate cancer, and worsening morbid obesity. He presents to [facility] for CORONARY ARTERY BYPASS GRAFT x2 (LIMA-LAD, SVG-LPL), Pulmonary vein isolation with RF ablation, LIGATION, LEFT ATRIAL APPENDAGE, sternal closure with plates and wires.   Existing Precautions/Restrictions fall;cardiac;sternal;lifting   Limitations/Impairments safety/cognitive   Left Upper Extremity (Weight-bearing Status) partial  weight-bearing (PWB)  (no > 10 lb)   Right Upper Extremity (Weight-bearing Status) partial weight-bearing (PWB)  (no > 10 lb)   Left Lower Extremity (Weight-bearing Status) full weight-bearing (FWB)   Right Lower Extremity (Weight-bearing Status) full weight-bearing (FWB)   General Observations and Info OT consult: status post cardiac surgery   Cognitive Status Examination   Orientation Status orientation to person, place and time   Cognitive Status Comments Cognition appears grossly intact, will monitor   Visual Perception   Visual Impairment/Limitations WFL   Sensory   Sensory Comments B LE baseline neuropathy; UE WFL   Pain Assessment   Patient Currently in Pain Yes, see Vital Sign flowsheet   Posture   Posture forward head position   Posture Comments intermittently seated at EOB, unsupported, able to self-correct   Range of Motion Comprehensive   General Range of Motion bilateral upper extremity ROM WFL   Strength Comprehensive (MMT)   Comment, General Manual Muscle Testing (MMT) Assessment B UE strength grossly deconditioned formal MMT not assessed   Muscle Tone Assessment   Muscle Tone Quick Adds No deficits were identified   Coordination   Upper Extremity Coordination No deficits were identified   Bed Mobility   Bed Mobility supine-sit   Supine-Sit [place] (Bed Mobility) minimum assist (75% patient effort);moderate assist (50% patient effort);2 person assist   Comment (Bed Mobility) per clinical judgement   Transfers   Transfers sit-stand transfer;toilet transfer   Sit-Stand Transfer   Sit-Stand [place] (Transfers) minimum assist (75% patient effort);2 person assist   Sit/Stand Transfer Comments from chair   Toilet Transfer   Type (Toilet Transfer) sit-stand   [place] Level (Toilet Transfer) moderate assist (50% patient effort);2 person assist   Toilet Transfer Comments per clinical judgement   Balance   Balance Assessment sitting static balance   Sitting Balance: Static contact guard   Balance  Comments at EOB   Activities of Daily Living   BADL Assessment/Intervention bathing;upper body dressing;lower body dressing;grooming;toileting   Bathing Assessment/Intervention   [place] Level (Bathing) moderate assist (50% patient effort);set up   Comment, (Bathing) per clinical judgement   Assistive Devices (Bathing) shower chair   Upper Body Dressing Assessment/Training   Comment, (Upper Body Dressing) per clinical judgement   [place] Level (Upper Body Dressing) minimum assist (75% patient effort);set up   Lower Body Dressing Assessment/Training   Comment, (Lower Body Dressing) per clinical judgement   [place] Level (Lower Body Dressing) moderate assist (50% patient effort);maximum assist (25% patient effort)   Grooming Assessment/Training   [place] Level (Grooming) contact guard assist;set up   Comment, (Grooming) per clinical judgement   Toileting   Comment, (Toileting) per clinical judgement   [place] Level (Toileting) maximum assist (25% patient effort)   Clinical Impression   Criteria for Skilled Therapeutic Interventions Met (OT) Yes, treatment indicated   OT Diagnosis Decreased ADL IND/safety, functional mobility, overall activity tolerance, cardiovascular endurance   OT Problem List-Impairments impacting ADL problems related to;activity tolerance impaired;balance;cognition;mobility;range of motion (ROM);strength;pain;post-surgical precautions;postural control   Assessment of Occupational Performance 5 or more Performance Deficits   Identified Performance Deficits dressing, bathing, toileting, functional mobility, cardiovascular endurance, IADL tasks   Planned Therapy Interventions (OT) ADL retraining;IADL retraining;balance training;bed mobility training;cognition;ROM;strengthening;transfer training;progressive activity/exercise   Clinical Decision Making Complexity (OT) problem focused assessment/low complexity   Risk & Benefits of therapy have been explained evaluation/treatment  results reviewed;care plan/treatment goals reviewed;risks/benefits reviewed;participants included;patient;spouse/significant other;daughter;son   Clinical Impression Comments Pt will benefit from continued skilled OT/CR during IP stay to progress IND/safety and return to PLOF   OT Total Evaluation Time   OT Eval, Low Complexity Minutes ([id number]) 5   OT Goals   Therapy Frequency (OT) 6 times/week   OT Predicted Duration/Target Date for Goal Attainment 08/20/24   OT Goals Upper Body Bathing;Lower Body Bathing;Hygiene/Grooming;Toilet Transfer/Toileting;Cognition;Cardiac Phase 1   OT: Hygiene/Grooming modified independent;while standing;within precautions   OT: Upper Body Bathing Modified independent;using adaptive equipment   OT: Lower Body Bathing Modified independent;using adaptive equipment   OT: Toilet Transfer/Toileting Supervision/stand-by assist;toilet transfer;cleaning and garment management;within precautions   OT: Cognitive Patient/caregiver will verbalize understanding of cognitive assessment results/recommendations as needed for safe discharge planning   OT: Understanding of cardiac education to maximize quality of life, condition management, and health outcomes Patient;Caregiver;Verbalize;Demonstrate   OT: Perform aerobic activity with stable cardiovascular response intermittent;5 minutes;10 minutes;ambulation;NuStep   OT: Functional/aerobic ambulation tolerance with stable cardiovascular response in order to return to home and community environment   (Defer to PT provider)   OT: Navigation of stairs simulating home set up with stable cardiovascular response in order to return to home and community environment   (Defer to PT provider)   OT Discharge Planning   OT Plan OT/CR: review precs, standing ADL tolerance, functional mobility as able   OT Discharge Recommendation (DC Rec) Transitional Care Facility;home with outpatient cardiac rehab   OT Rationale for DC Rec Pt below baseline function for ADL tasks,  functional mobility, overall cardiovascular endurance. Currently completing functional transfers with min A x 2 + forearm assist, limited by hypotension and deconditioning this date. Great family support and accessible home. Would recommend continued rehab at TCU this date to progress IND/safety, may progress to home with assist and OP CR. will monitor and update.   OT Brief overview of current status A x 2 STS and side-step transfer chair>bed   Total Session Time   Total Session Time (sum of timed and untimed services) 5

## 2024-07-31 NOTE — PROGRESS NOTES
[facility], Procedure Note          Extubation:       Jorje Hutchinson  MRN# [id number]   July 30, 2024, 8:45 PM         Patient extubated at: July 30, 2024, 8:45 PM   Supplemental Oxygen: Via BIPAP/AVAPS at 40 percent   Cough: The cough is strong   Secretion Mode: PRN suction with assistance   Secretion Amount: Small amount, moderately thin and clear in color   Respiratory Exam:: Breath sounds: equal and clear     Location: all lobes   Skin Exam:: Patient color: pink   Patient Status: Currently appears comfortable   Arterial Blood Gasses: pH Arterial (no units)   Date Value   07/30/2024 7.34 (L)     pH Arterial POCT (no units)   Date Value   07/30/2024 7.40     pO2 Arterial (mm Hg)   Date Value   07/30/2024 145 (H)     pO2 Arterial POCT (mm Hg)   Date Value   07/30/2024 180 (H)     pCO2 Arterial (mm Hg)   Date Value   07/30/2024 45     pCO2 Arterial POCT (mm Hg)   Date Value   07/30/2024 37     Bicarbonate Arterial (mmol/L)   Date Value   07/30/2024 24     Bicarbonate Arterial POCT (mmol/L)   Date Value   07/30/2024 23            Patient extubated to AVAPS settings per provider order. Cuff leak checked and present. Inline, subglottic and oral suction prior. No stridor heard.     Recorded by Karly Zuleta RT

## 2024-07-31 NOTE — ADDENDUM NOTE
Addendum  created 07/31/24 1221 by Shahriar Wise MD    Clinical Note Signed, Intraprocedure Blocks edited, SmartForm saved

## 2024-07-31 NOTE — PROGRESS NOTES
"   07/31/24 1048   Appointment Info   Signing Clinician's Name / Credentials (PT) Destiny Lim DPT   Rehab Comments (PT) sternal precautions, external pacing wires   Living Environment   People in Home spouse   Current Living Arrangements house   Home Accessibility wheelchair accessible;other (see comments)  (ramp at entrance)   Transportation Anticipated car, drives self   Self-Care   Usual Activity Tolerance good   Current Activity Tolerance fair   Equipment Currently Used at Home walker, rolling   Fall history within last six months no   Activity/Exercise/Self-Care Comment Pts wife reports he has a WW available if needed, has been using it when knee is \"acting up\".   General Information   Onset of Illness/Injury or Date of Surgery 07/30/24   Referring Physician Blayne Hammond MD   Pertinent History of Current Problem (include personal factors and/or comorbidities that impact the POC) Per chart review \"Pt is a 74 year old male with PMH of CAD sp coronary angioplasty w/stent placement of circumflex coronary artery in 2002, HTN, HLD, SVT, HERRERA, CKD, T2DM, OA, prostate cancer, and worsening morbid obesity. He presents to [facility] for CORONARY ARTERY BYPASS GRAFT x2 (LIMA-LAD, SVG-LPL), Pulmonary vein isolation with RF ablation, LIGATION, LEFT ATRIAL APPENDAGE, sternal closure with plates and wires. \"   Existing Precautions/Restrictions cardiac;sternal;pacemaker  (external pacemaker)   Cognition   Affect/Mental Status (Cognition) WNL   Orientation Status (Cognition) oriented x 4   Follows Commands (Cognition) over 90% accuracy   Pain Assessment   Patient Currently in Pain Yes, see Vital Sign flowsheet  (incisional pain)   Integumentary/Edema   Integumentary/Edema other (describe)   Integumentary/Edema Comments sternal incision   Posture    Posture Forward head position;Protracted shoulders   Range of Motion (ROM)   Range of Motion ROM is WFL   Strength (Manual Muscle Testing)   Strength (Manual Muscle Testing) " Deficits observed during functional mobility   Bed Mobility   Comment, (Bed Mobility) Pt rolls with Mod A x 2.   Transfers   Comment, (Transfers) Pt tx sit->stand with Mod A x 2.   Gait/Stairs (Locomotion)   Comment, (Gait/Stairs) Pt taking 2 steps to bedside chair with Mod A x 2.   Balance   Balance other (describe)   Balance Comments minimal sway in static standing   Sensory Examination   Sensory Perception other (describe)   Sensory Perception Comments Pt reports neuropathy in B feet   Clinical Impression   Criteria for Skilled Therapeutic Intervention Yes, treatment indicated   PT Diagnosis (PT) Impaired Functional Mobility   Influenced by the following impairments decreased strength, activity intolerance, impaired balance, sternal precautions   Functional limitations due to impairments bed mob, transfers, gait   Clinical Presentation (PT Evaluation Complexity) stable   Clinical Presentation Rationale PMHx, clinical judgement, current presentation   Clinical Decision Making (Complexity) low complexity   Planned Therapy Interventions (PT) balance training;bed mobility training;gait training;neuromuscular re-education;strengthening;transfer training;progressive activity/exercise   Risk & Benefits of therapy have been explained care plan/treatment goals reviewed   PT Total Evaluation Time   PT Eval, Low Complexity Minutes (63614) 10   Physical Therapy Goals   PT Frequency 5x/week   PT Predicted Duration/Target Date for Goal Attainment 08/08/24   PT Goals Bed Mobility;Transfers;Gait;Stairs   PT: Bed Mobility Independent;Supine to/from sit;Rolling;Bridging;Within precautions   PT: Transfers Independent;Sit to/from stand;Bed to/from chair;Within precautions   PT: Gait Modified independent;Rolling walker;150 feet   PT: Stairs Independent;1 stair  (1 step to access shower area)   PT Discharge Planning   PT Plan bed mob, transfers, pre-gait/gait with WW   PT Discharge Recommendation (DC Rec) Transitional Care Facility    PT Rationale for DC Rec Pt currently requires A x2 for bed mobility and transfers. Pt requires pressor titration for safe mobility, as pt with BP drop with all PT actiivty. Pt would benefit from additional skilled PT to maximize IND, safety and reduce caregiver burden.   PT Brief overview of current status up with A  x 2   Total Session Time   Total Session Time (sum of timed and untimed services) 10

## 2024-07-31 NOTE — PROVIDER NOTIFICATION
Notified Dr. Bray of post extubation ABG- rate increased on AVAPS from 14 to 18, will repeat ABG in 1 hour.

## 2024-07-31 NOTE — PLAN OF CARE
Major Shift Events:  Pt is A&Ox4. Intact. LAO. Up to chair w/ 2x assist, up x2 today. Mild dizziness when standing, hypotensive. PRN oxy and dilaudid for pain, L side of ribs. MAP goal >65, systolic <130. Temporary pacemaker, % paced. Turned down to 50 in AM w/ Dr. Wilson, still 100% paced. CVP 12. 3 chest tubes. Levo, slowly weaning. Use cuff pressures per team, ART line positional. CPAP at night, has home cpap here, per pt plugged nose and not working when tried for nap. RA to 2L O2 NC during day. No BM. Occasional bowel incontinence at home. Cardiac diet, good appetite. Armenta. Abdominal binder. Family visiting throughout day.  Plan: Wean levo. Continue w/ POC.  For vital signs and complete assessments, please see documentation flowsheets.

## 2024-07-31 NOTE — PROGRESS NOTES
CV ICU PROGRESS NOTE    Jorje Hutchinson  [id number]  Admitted: 7/30/2024  5:30 AM      ASSESSMENT: Jorje Hutchinson is a 74 year old male with PMH of CAD sp coronary angioplasty w/stent placement of circumflex coronary artery in 2002, HTN, HLD, SVT, HERRERA, CKD, T2DM, OA, prostate cancer, and worsening morbid obesity. He presents to [facility] for CORONARY ARTERY BYPASS GRAFT x2 (LIMA-LAD, SVG-LPL), Pulmonary vein isolation with RF ablation, LIGATION, LEFT ATRIAL APPENDAGE, sternal closure with plates and wires.       Overnight Events:  - Extubated overnight without problem. Was on CPAP overnight. Tolerated RA when seen this morning. Family brought in home CPAP machine.  - Fibryga held given stable CTOP    TODAY'S PROGRESS  - Is moving from bed to chair, advanced from clear liquid diet, no nausea, dc'd GI ppx  - Weaning off levophed    CO-MORBIDITIES:   Patient Active Problem List   Diagnosis    Diabetes mellitus, type 2 (H)    Coronary artery disease involving native coronary artery of native heart, unspecified whether angina present     PLAN:  Neuro/ pain/ sedation:  #Acute Postoperative pain  - Monitor neurological status. Notify the MD for any acute changes in exam.  - Pain: Scheduled tylenol. PRN oxycodone, dilaudid, robaxin     Pulmonary care:   #Obstructive Sleep Apnea, CPAP  #Postoperative ventilation management, resolved  - Extubated on 7/30, tolerated well  - Titrate supplemental oxygen to maintain saturation above 92%.  - Pulmonary hygiene: Incentive spirometer every 15-30 minutes when awake, flutter valve, C&DB  - Home CPAP at night     Cardiovascular:    #S/p CABG x2 (LIMA-LAD, SVG-LPL), Pulmonary vein isolation with RF ablation, LIGATION, LEFT ATRIAL APPENDAGE, sternal closure with plates and wires on 7/30/24 by Dr. Wilson  #History of CAD sp coronary angioplasty w/stent of circumflex coronary a in 2002  #HTN  #HLD  #Supraventricular tachycardia  #Post-op vasoplegia  #Hx of a fib  Recent echo on 6/20/24 with  LVEF of 60-65%  - Monitor hemodynamic status.   - Goal MAP>65, SBP<130  - Restart  PTA rosuvastatin 20  - BB hold  - ASA 81mg: start today on POD1  - Epi discontinued  - Norepi gtt, wean as tolerated  - Decrease pacing as tolerated  - PTA meds: asa (restarted), clopidogrel 75 mg am, diltiazem 30 mg prn , diltiazem ER 120mg every day, losartan 25 mg at bedtime, sotalol 80 mg qd, spironolactone 25 mg every day, torsemide 20 mg at bedtime to be held    GI care/ Nutrition:   #Concern for protein-calorie malnutrition  - Diet: Combo of moderate consistent CHO diet and low saturated fat sodium less than 2400 mg  - Discontinued protonix as not PTA med  - Continue bowel regimen: miralax, senna    Renal/ Fluid Balance/ Electrolytes:   #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  - Clopidogrel w/ continued bleeding of prostate 2/2 prior radiation therapy/proctitis. HELD    Endocrine:    Stress-induced hyperglycemia  T2DM, insulin dependent  Preop A1c 6.7% on 7/1/24  - Insulin gtt discontinued, sliding scale insulin started  - Goal BG <180 for optimal healing  - PTA meds: Empagliflozin 10 mg every day, insulin glargine 38u subcutaneous AM to be held    ID/ Antibiotics:  #Stress induced leukocytosis  - To complete perioperative regimen today (Ancef 2g q8h for 3 total doses)  - Continue to monitor fever curve, WBC and inflammatory markers as appropriate    Heme:  #Stress induced leukocytosis  #Acute blood loss anemia  #Acute blood loss thrombocytopenia  No s/sx active bleeding  - Continue to monitor  - Daily CBC,  Coag panel  - Slight hgb drop likely dilutional  - Heparin subQ 5000 TID to be started on POD1     MSK/ Skin:  #Sternotomy  #Surgical Incision  #Chronic left knee pain  - Sternal precautions  - Postoperative incision management per protocol  - PT/OT/CR  - PTA allopurinol 100 mg bid held     Prophylaxis:  - Mechanical prophylaxis for DVT  - Chemical DVT prophylaxis - start subcutaneous heparin today POD1    Lines/ tubes/ drains:  - RIJ CVC with introducer  - Arterial Line  - CTs x3 (2 mediastinal, 1 R pleural)  - House     Disposition:  - CVICU     ICU Checklist  F - Feeding: has moderate carb, low saturated fat diet in place  A - Analgesia: controlled on scheduled tylenol, PRN oxycodone, dilaudid, robaxin  S - Sedation: discontinued, no need  T - Thromboembolic prophylaxis: SQH     H - Head of bed elevated - yes  U - Ulcer prophylaxis: no longer necessary, protonix discontinued  G - Glycemic control: appropriate, discontinued insulin gtt and starting sliding scale insulin   S - SBT: N/A     B - Bowel regimen: miralax and senna in place  I - Indwelling catheter: house appropriate for now, will reevaluate need later today  D - De-escalation of antibiotics: will complete postoperative abx today     Patient seen, findings and plan discussed with CVICU staff.    Maryjane San, MS-4      Nathan Gregorio MD  PGY1 General Surgery    ====================================      OBJECTIVE  1. VITAL SIGNS  Pulse:  [80-82] 80  Resp:  [11-22] 19  MAP:  [48 mmHg-309 mmHg] 67 mmHg  Arterial Line BP: ()/(41-62) 112/54  FiO2 (%):  [40 %] 40 %  SpO2:  [94 %-100 %] 94 %  Vent Mode: CPAP/PS  FiO2 (%): 40 %  Resp Rate (Set): 14 breaths/min  Tidal Volume (Set, mL): 450 mL  PEEP (cm H2O): 5 cmH2O  Pressure Support (cm H2O): 5 cmH2O  Resp: 19      2. INTAKE/ OUTPUT  I/O last 3 completed shifts:  In: 6410.62 [P.O.:1420; I.V.:3670.62; Other:150]  Out: 1930 [Urine:1550; Chest Tube:380]    3. PHYSICAL EXAMINATION  GEN:  no acute  distress  NEURO:  no focal deficits, moving upper and lower extremities spontaneously  CV:  RRR, no edema, paced on VVI  PULM:  non-labored breathing, no stridor or wheezes  MSK:  extremities warm and well perfused  SKIN:  no rash, sternotomy site c/d/I  CT:  to suction, serosang output, no airleak or crepitus    4. INVESTIGATIONS  Arterial Blood Gases   Recent Labs   Lab 07/31/24  0259 07/30/24  2308 07/30/24 2143 07/30/24  1955   PH 7.34* 7.34* 7.29* 7.34*   PCO2 44 44 51* 45   PO2 129* 140* 133* 145*   HCO3 24 24 25 24     Complete Blood Count   Recent Labs   Lab 07/31/24  0258 07/30/24 2142 07/30/24  1702 07/30/24  1550 07/30/24  1449 07/30/24  1433 07/30/24  0916 07/29/24  1429   WBC 11.6* 12.7* 8.9  --   --   --   --  7.0   HGB 8.6* 9.0* 7.3* 8.1*   < >  --    < > 9.1*   * 176 136*  --   --  99*  --  168    < > = values in this interval not displayed.     Basic Metabolic Panel  Recent Labs   Lab 07/31/24  0706 07/31/24  0506 07/31/24  0306 07/31/24  0258 07/30/24  2146 07/30/24  2142 07/30/24  1832 07/30/24  1702 07/30/24  1659 07/30/24  1550   NA  --   --   --  137  --  140  --  141  --  140   POTASSIUM  --   --   --  4.8  --  4.6  --  4.4  --  4.2   CHLORIDE  --   --   --  105  --  108*  --  107  --   --    CO2  --   --   --  22  --  23  --  21*  --   --    BUN  --   --   --  30.9*  --  29.6*  --  27.9*  --   --    CR  --   --   --  1.46*  --  1.36*  --  1.23*  --   --    * 115* 127* 129*   < > 133*   < > 124*   < > 136*    < > = values in this interval not displayed.     Liver Function Tests  Recent Labs   Lab 07/31/24 0258 07/30/24 2142 07/30/24 1702 07/30/24  1433   AST 53* 46*  46* 37  --    ALT 9 9  9 9  --    ALKPHOS 73 73  73 67  --    BILITOTAL 0.4 0.6  0.6 0.6  --    ALBUMIN 3.4* 3.4*  3.4* 3.2*  --    INR 1.27* 1.32* 1.41* 1.49*     Pancreatic Enzymes  No lab results found in last 7 days.  Coagulation Profile  Recent Labs   Lab 07/31/24 0258 07/30/24 2142 07/30/24 1702  "07/30/24  1433   INR 1.27* 1.32* 1.41* 1.49*   PTT 28 32 31 29     Lactate  Invalid input(s): \"LACTATE\"    5. RADIOLOGY  Recent Results (from the past 24 hour(s))   RAPHAEL with Report    Narrative    Tyrone Barrientos DO     7/30/2024  3:17 PM  Perioperative RAPHAEL Procedure Note    Staff -        Resident/Fellow: Tyrone Barrientos DO       Performed By: fellow  Preanesthesia Checklist:  Patient identified, IV assessed, risks and   benefits discussed, monitors and equipment assessed, procedure being   performed at surgeon's request and anesthesia consent obtained.    RAPHAEL Probe Insertion    Probe Status PRE Insertion: NO obvious damage  Probe type:  Adult 3D  Bite block used:   Oral Airway  Insertion Technique: Jaw Lift  Insertion complications: None obvious  Billing Report:RAPHAEL report by Anesthesiologist (See Separate Report note)  Probe Status POST Removal: NO obvious damage    RAPHAEL Report  General Procedure Information  Images for this study have been archived.  Modalities: Color flow mapping, PW Doppler, CW Doppler, 3D and 2D  Diagnostic indications for RAPHAEL:   Cardiomyopathy, other  Echocardiographic and Doppler Measurements  Right Ventricle:  Cavity size normal.    Hypertrophy not present.     Thrombus not present.    Global function normal.     Left Ventricle:  Cavity size normal.    Hypertrophy not present.     Thrombus not present.   Global Function normal.       Ventricular Regional Function:  1- Basal Anteroseptal:  normal  2- Basal Anterior:  normal  3- Basal Anterolateral:  normal  4- Basal Inferolateral:  normal  5- Basal Inferior:  normal  6- Basal Inferoseptal:  normal  7- Mid Anteroseptal:  normal  8- Mid Anterior:  normal  9- Mid Anterolateral:  normal  10- Mid Inferolateral:  normal  11- Mid Inferior:  normal  12- Mid Inferoseptal:  normal  13- Apical Anterior:  normal  14- Apical Lateral:  normal  15- Apical Inferior:  normal  16- Apical Septal:  normal  17- Lindstrom:  normal    Valves  Aortic Valve: Annulus " normal.  Stenosis not present.  Regurgitation   absent.  Leaflets normal.  Leaflet motions normal.    Mitral Valve: Annulus normal.  Stenosis not present.  Regurgitation   absent.  Leaflets normal.  Leaflet motions normal.    Tricuspid Valve: Annulus normal.  Stenosis not present.  Regurgitation   absent.  Leaflets normal.  Leaflet motions normal.        Aorta: Ascending Aorta: Size normal.  Dissection not present.  Plaque   thickness less than 3 mm.  Mobile plaque not present.    Aortic Arch: Size normal.    Dissection not present.   Plaque thickness   less than 3 mm.   Mobile plaque not present.    Descending Aorta: Size normal.    Dissection not present.   Plaque   thickness less than 3 mm.   Mobile plaque not present.      Right Atrium:  Size normal.   Spontaneous echo contrast not present.     Thrombus not present.   Tumor not present.   Device not present.     Left Atrium: Size normal.  Spontaneous echo contrast not present.    Thrombus not present.  Tumor not present.  Device not present.    Left atrial appendage normal.     Atrial Septum: Intra-atrial septal morphology normal.       Ventricular Septum: Intra-ventricular septum morphology normal.        Other Findings:   Pericardium:  normal. Pleural Effusion:  none. Pulmonary Arteries:    normal. Pulmonary Venous Flow:  normal. Cornoary sinus catheter present.    Post Intervention Findings  Procedure(s) performed:  CABG. Global function:  Unchanged. Regional wall   motion: Unchanged. Surgeon(s) notified of all postintervention findings:   Yes.                 Echocardiogram Comments  Echocardiogram comments:   Pre-CPB:  Grossly normal LV size with preserved systolic function and EF 62% by   biplane assessment. Normal LV diastolic function by E/A 1.4 and lateral e'   10.8 cm/s. Grossly normal RV size with no evidence of systolic dysfunction   by TAPSE 25 mm. STACY velocities > 4 cm/s with no visualization of SEC or   thrombus via 3D assessment. No PFO by CFD.  Trace MR. Trileaflet aortic   valve without significant regurgitation or stenosis. No pleural or   pericardial effusion. No echo evidence of aortic dissection. Findings   communicated with surgical team in real time.    Post-CPB:  Globally unchanged biventricular size and function. No new RWMA. MR   trace-mild by visual appearance and 2D PISA EROA 0.06 cm^2. No significant   pleural effusion. No echo evidence of aortic dissection. Findings   communicated with surgical team in real time..   XR Chest Port 1 View    Narrative    Portable chest    INDICATION: Endotracheal tube positioning    COMPARISON: CT chest 7/1/2024    FINDINGS: Low inspiratory volumes. Left basilar thoracostomy tube. No  pneumothorax. Left atrial appendage ligation clip. Sternal fixation.  Endotracheal tube in the mid thoracic trachea. Mediastinal drain  tubing. Right IJ sheath tip in the low right internal jugular vein.  Bandlike densities in the left lung suggest mild edema or subsegmental  atelectasis.    Impression    IMPRESSION: Endotracheal tube presumably in the mid thoracic trachea.  Mild edema/atelectasis especially in the left lung.    ARMIDA GONZALEZ MD         SYSTEM ID:  [id number]   XR Chest Port 1 View    Impression    RESIDENT PRELIMINARY INTERPRETATION  Impression:   1. Interval removal of endotracheal tube. Otherwise stable support  devices.  2. Continued left greater than right perihilar predominant opacities,  likely mild pulmonary edema and atelectasis.

## 2024-07-31 NOTE — PROGRESS NOTES
Critical Care Attending Progress Note  I, Jeimy Mackenzie MD, PhD saw this patient with the resident and agree with the findings and plan of care as documented in the note. Please see separate note from today for further documentation.     I personally reviewed vital signs, medications, labs and imaging.     Assessment:   Patient is is s/p coronary angioplasty w/ stent placement of the circumflex coronary artery in 2002. He endorses significant GALLARDO. A cath on 5/22/24 demonstrated 70% occulsion of the proximal LAD, 60% distal LAD, 30% proximal LCx, 30% distal LCx, 90 % pRCA. EF 60%. 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   I personally managed the ventilator, hemodynamics, sedation, analgesia, metabolic abnormalities and nutritional status.    I agree with the assessment and plan. I spent 46 minutes exclusive of procedures evaluating and managing this patient, discussing with the consultants, and updating the patient and family.     Jeimy Mackenzie MD, PhD

## 2024-07-31 NOTE — PHARMACY-ADMISSION MEDICATION HISTORY
Medication history completed on 7/23.  See note for pre-procedure medications.  Avery Montoya, Pharm.D., R.Ph., PGY2 Critical Care Pharmacy Resident

## 2024-08-01 ENCOUNTER — APPOINTMENT (OUTPATIENT)
Dept: GENERAL RADIOLOGY | Facility: CLINIC | Age: 74
DRG: 233 | End: 2024-08-01
Attending: SURGERY
Payer: MEDICARE

## 2024-08-01 LAB
ALBUMIN SERPL BCG-MCNC: 3 G/DL (ref 3.5–5.2)
ALBUMIN UR-MCNC: 10 MG/DL
ALP SERPL-CCNC: 80 U/L (ref 40–150)
ALT SERPL W P-5'-P-CCNC: <5 U/L (ref 0–70)
ANION GAP SERPL CALCULATED.3IONS-SCNC: 10 MMOL/L (ref 7–15)
APPEARANCE UR: ABNORMAL
APTT PPP: 34 SECONDS (ref 22–38)
AST SERPL W P-5'-P-CCNC: 36 U/L (ref 0–45)
ATRIAL RATE - MUSE: 32 BPM
ATRIAL RATE - MUSE: 79 BPM
BILIRUB DIRECT SERPL-MCNC: 0.22 MG/DL (ref 0–0.3)
BILIRUB SERPL-MCNC: 0.4 MG/DL
BILIRUB UR QL STRIP: NEGATIVE
BLD PROD TYP BPU: NORMAL
BLOOD COMPONENT TYPE: NORMAL
BUN SERPL-MCNC: 40.9 MG/DL (ref 8–23)
CALCIUM SERPL-MCNC: 8.4 MG/DL (ref 8.8–10.4)
CHLORIDE SERPL-SCNC: 99 MMOL/L (ref 98–107)
CODING SYSTEM: NORMAL
COLOR UR AUTO: YELLOW
CREAT SERPL-MCNC: 1.86 MG/DL (ref 0.67–1.17)
CROSSMATCH: NORMAL
DIASTOLIC BLOOD PRESSURE - MUSE: NORMAL MMHG
DIASTOLIC BLOOD PRESSURE - MUSE: NORMAL MMHG
EGFRCR SERPLBLD CKD-EPI 2021: 37 ML/MIN/1.73M2
ERYTHROCYTE [DISTWIDTH] IN BLOOD BY AUTOMATED COUNT: 19.7 % (ref 10–15)
ERYTHROCYTE [DISTWIDTH] IN BLOOD BY AUTOMATED COUNT: 19.9 % (ref 10–15)
FIBRINOGEN PPP-MCNC: 508 MG/DL (ref 170–510)
GLUCOSE BLDC GLUCOMTR-MCNC: 232 MG/DL (ref 70–99)
GLUCOSE BLDC GLUCOMTR-MCNC: 243 MG/DL (ref 70–99)
GLUCOSE BLDC GLUCOMTR-MCNC: 249 MG/DL (ref 70–99)
GLUCOSE BLDC GLUCOMTR-MCNC: 272 MG/DL (ref 70–99)
GLUCOSE SERPL-MCNC: 220 MG/DL (ref 70–99)
GLUCOSE UR STRIP-MCNC: 50 MG/DL
HCO3 SERPL-SCNC: 21 MMOL/L (ref 22–29)
HCT VFR BLD AUTO: 24.2 % (ref 40–53)
HCT VFR BLD AUTO: 24.4 % (ref 40–53)
HGB BLD-MCNC: 7.9 G/DL (ref 13.3–17.7)
HGB BLD-MCNC: 7.9 G/DL (ref 13.3–17.7)
HGB UR QL STRIP: ABNORMAL
HYALINE CASTS: 35 /LPF
INR PPP: 1.42 (ref 0.85–1.15)
INTERPRETATION ECG - MUSE: NORMAL
INTERPRETATION ECG - MUSE: NORMAL
KETONES UR STRIP-MCNC: NEGATIVE MG/DL
LEUKOCYTE ESTERASE UR QL STRIP: ABNORMAL
MAGNESIUM SERPL-MCNC: 2.5 MG/DL (ref 1.7–2.3)
MCH RBC QN AUTO: 29.5 PG (ref 26.5–33)
MCH RBC QN AUTO: 29.9 PG (ref 26.5–33)
MCHC RBC AUTO-ENTMCNC: 32.4 G/DL (ref 31.5–36.5)
MCHC RBC AUTO-ENTMCNC: 32.6 G/DL (ref 31.5–36.5)
MCV RBC AUTO: 91 FL (ref 78–100)
MCV RBC AUTO: 92 FL (ref 78–100)
MUCOUS THREADS #/AREA URNS LPF: PRESENT /LPF
NITRATE UR QL: NEGATIVE
P AXIS - MUSE: NORMAL DEGREES
P AXIS - MUSE: NORMAL DEGREES
PH UR STRIP: 5 [PH] (ref 5–7)
PHOSPHATE SERPL-MCNC: 5.3 MG/DL (ref 2.5–4.5)
PLATELET # BLD AUTO: 100 10E3/UL (ref 150–450)
PLATELET # BLD AUTO: 98 10E3/UL (ref 150–450)
POTASSIUM SERPL-SCNC: 5.1 MMOL/L (ref 3.4–5.3)
PR INTERVAL - MUSE: NORMAL MS
PR INTERVAL - MUSE: NORMAL MS
PROT SERPL-MCNC: 5 G/DL (ref 6.4–8.3)
QRS DURATION - MUSE: 168 MS
QRS DURATION - MUSE: 170 MS
QT - MUSE: 474 MS
QT - MUSE: 494 MS
QTC - MUSE: 546 MS
QTC - MUSE: 569 MS
R AXIS - MUSE: -12 DEGREES
R AXIS - MUSE: -57 DEGREES
RBC # BLD AUTO: 2.64 10E6/UL (ref 4.4–5.9)
RBC # BLD AUTO: 2.68 10E6/UL (ref 4.4–5.9)
RBC URINE: 177 /HPF
SODIUM SERPL-SCNC: 130 MMOL/L (ref 135–145)
SP GR UR STRIP: 1.02 (ref 1–1.03)
SYSTOLIC BLOOD PRESSURE - MUSE: NORMAL MMHG
SYSTOLIC BLOOD PRESSURE - MUSE: NORMAL MMHG
T AXIS - MUSE: 117 DEGREES
T AXIS - MUSE: 118 DEGREES
UNIT ABO/RH: NORMAL
UNIT NUMBER: NORMAL
UNIT STATUS: NORMAL
UNIT TYPE ISBT: 6200
UROBILINOGEN UR STRIP-MCNC: NORMAL MG/DL
VENTRICULAR RATE- MUSE: 80 BPM
VENTRICULAR RATE- MUSE: 80 BPM
WBC # BLD AUTO: 9 10E3/UL (ref 4–11)
WBC # BLD AUTO: 9.2 10E3/UL (ref 4–11)
WBC CLUMPS #/AREA URNS HPF: PRESENT /HPF
WBC URINE: 36 /HPF

## 2024-08-01 PROCEDURE — 250N000013 HC RX MED GY IP 250 OP 250 PS 637: Performed by: PHYSICIAN ASSISTANT

## 2024-08-01 PROCEDURE — 85027 COMPLETE CBC AUTOMATED: CPT

## 2024-08-01 PROCEDURE — 80053 COMPREHEN METABOLIC PANEL: CPT | Performed by: PHYSICIAN ASSISTANT

## 2024-08-01 PROCEDURE — 250N000012 HC RX MED GY IP 250 OP 636 PS 637: Performed by: PHYSICIAN ASSISTANT

## 2024-08-01 PROCEDURE — 200N000002 HC R&B ICU UMMC

## 2024-08-01 PROCEDURE — 85610 PROTHROMBIN TIME: CPT | Performed by: PHYSICIAN ASSISTANT

## 2024-08-01 PROCEDURE — 85730 THROMBOPLASTIN TIME PARTIAL: CPT | Performed by: PHYSICIAN ASSISTANT

## 2024-08-01 PROCEDURE — 85027 COMPLETE CBC AUTOMATED: CPT | Performed by: PHYSICIAN ASSISTANT

## 2024-08-01 PROCEDURE — 250N000011 HC RX IP 250 OP 636: Performed by: SURGERY

## 2024-08-01 PROCEDURE — 81001 URINALYSIS AUTO W/SCOPE: CPT | Performed by: PHYSICIAN ASSISTANT

## 2024-08-01 PROCEDURE — 85384 FIBRINOGEN ACTIVITY: CPT | Performed by: PHYSICIAN ASSISTANT

## 2024-08-01 PROCEDURE — 99291 CRITICAL CARE FIRST HOUR: CPT | Mod: 24 | Performed by: ANESTHESIOLOGY

## 2024-08-01 PROCEDURE — 83735 ASSAY OF MAGNESIUM: CPT | Performed by: PHYSICIAN ASSISTANT

## 2024-08-01 PROCEDURE — 71045 X-RAY EXAM CHEST 1 VIEW: CPT | Mod: 26 | Performed by: STUDENT IN AN ORGANIZED HEALTH CARE EDUCATION/TRAINING PROGRAM

## 2024-08-01 PROCEDURE — P9016 RBC LEUKOCYTES REDUCED: HCPCS

## 2024-08-01 PROCEDURE — 250N000011 HC RX IP 250 OP 636

## 2024-08-01 PROCEDURE — 250N000013 HC RX MED GY IP 250 OP 250 PS 637: Performed by: SURGERY

## 2024-08-01 PROCEDURE — 82248 BILIRUBIN DIRECT: CPT | Performed by: PHYSICIAN ASSISTANT

## 2024-08-01 PROCEDURE — 84100 ASSAY OF PHOSPHORUS: CPT | Performed by: PHYSICIAN ASSISTANT

## 2024-08-01 PROCEDURE — 71045 X-RAY EXAM CHEST 1 VIEW: CPT

## 2024-08-01 RX ORDER — TAMSULOSIN HYDROCHLORIDE 0.4 MG/1
0.4 CAPSULE ORAL DAILY
Status: DISCONTINUED | OUTPATIENT
Start: 2024-08-02 | End: 2024-08-01

## 2024-08-01 RX ORDER — FUROSEMIDE 10 MG/ML
40 INJECTION INTRAMUSCULAR; INTRAVENOUS ONCE
Status: COMPLETED | OUTPATIENT
Start: 2024-08-01 | End: 2024-08-01

## 2024-08-01 RX ORDER — TAMSULOSIN HYDROCHLORIDE 0.4 MG/1
0.4 CAPSULE ORAL DAILY
Status: DISCONTINUED | OUTPATIENT
Start: 2024-08-01 | End: 2024-08-10 | Stop reason: HOSPADM

## 2024-08-01 RX ORDER — FUROSEMIDE 10 MG/ML
80 INJECTION INTRAMUSCULAR; INTRAVENOUS ONCE
Status: DISCONTINUED | OUTPATIENT
Start: 2024-08-01 | End: 2024-08-01

## 2024-08-01 RX ORDER — NOREPINEPHRINE BITARTRATE 0.06 MG/ML
.01-.6 INJECTION, SOLUTION INTRAVENOUS CONTINUOUS
Status: DISCONTINUED | OUTPATIENT
Start: 2024-08-01 | End: 2024-08-02

## 2024-08-01 RX ADMIN — POLYETHYLENE GLYCOL 3350 17 G: 17 POWDER, FOR SOLUTION ORAL at 09:02

## 2024-08-01 RX ADMIN — FUROSEMIDE 40 MG: 10 INJECTION, SOLUTION INTRAVENOUS at 08:45

## 2024-08-01 RX ADMIN — MAGNESIUM HYDROXIDE 30 ML: 400 SUSPENSION ORAL at 17:17

## 2024-08-01 RX ADMIN — INSULIN ASPART 3 UNITS: 100 INJECTION, SOLUTION INTRAVENOUS; SUBCUTANEOUS at 09:01

## 2024-08-01 RX ADMIN — TAMSULOSIN HYDROCHLORIDE 0.4 MG: 0.4 CAPSULE ORAL at 11:33

## 2024-08-01 RX ADMIN — METHOCARBAMOL 500 MG: 500 TABLET ORAL at 01:23

## 2024-08-01 RX ADMIN — OXYCODONE HYDROCHLORIDE 5 MG: 5 TABLET ORAL at 01:23

## 2024-08-01 RX ADMIN — INSULIN GLARGINE 10 UNITS: 100 INJECTION, SOLUTION SUBCUTANEOUS at 22:16

## 2024-08-01 RX ADMIN — INSULIN ASPART 5 UNITS: 100 INJECTION, SOLUTION INTRAVENOUS; SUBCUTANEOUS at 17:22

## 2024-08-01 RX ADMIN — ACETAMINOPHEN 975 MG: 325 TABLET, FILM COATED ORAL at 16:22

## 2024-08-01 RX ADMIN — SENNOSIDES AND DOCUSATE SODIUM 1 TABLET: 8.6; 5 TABLET ORAL at 08:46

## 2024-08-01 RX ADMIN — INSULIN ASPART 4 UNITS: 100 INJECTION, SOLUTION INTRAVENOUS; SUBCUTANEOUS at 11:33

## 2024-08-01 RX ADMIN — ACETAMINOPHEN 975 MG: 325 TABLET, FILM COATED ORAL at 08:46

## 2024-08-01 RX ADMIN — HEPARIN SODIUM 5000 UNITS: 5000 INJECTION, SOLUTION INTRAVENOUS; SUBCUTANEOUS at 20:52

## 2024-08-01 RX ADMIN — ASPIRIN 81 MG CHEWABLE TABLET 162 MG: 81 TABLET CHEWABLE at 08:46

## 2024-08-01 RX ADMIN — HEPARIN SODIUM 5000 UNITS: 5000 INJECTION, SOLUTION INTRAVENOUS; SUBCUTANEOUS at 11:33

## 2024-08-01 RX ADMIN — HEPARIN SODIUM 5000 UNITS: 5000 INJECTION, SOLUTION INTRAVENOUS; SUBCUTANEOUS at 04:51

## 2024-08-01 RX ADMIN — SENNOSIDES AND DOCUSATE SODIUM 1 TABLET: 8.6; 5 TABLET ORAL at 20:52

## 2024-08-01 RX ADMIN — ROSUVASTATIN CALCIUM 20 MG: 20 TABLET, FILM COATED ORAL at 08:46

## 2024-08-01 ASSESSMENT — ACTIVITIES OF DAILY LIVING (ADL)
ADLS_ACUITY_SCORE: 41
ADLS_ACUITY_SCORE: 41
ADLS_ACUITY_SCORE: 43
ADLS_ACUITY_SCORE: 41
DEPENDENT_IADLS:: INDEPENDENT
ADLS_ACUITY_SCORE: 43
ADLS_ACUITY_SCORE: 41
ADLS_ACUITY_SCORE: 41
ADLS_ACUITY_SCORE: 43
ADLS_ACUITY_SCORE: 41
ADLS_ACUITY_SCORE: 43
ADLS_ACUITY_SCORE: 41
ADLS_ACUITY_SCORE: 43
ADLS_ACUITY_SCORE: 41
ADLS_ACUITY_SCORE: 43
ADLS_ACUITY_SCORE: 41
ADLS_ACUITY_SCORE: 43
ADLS_ACUITY_SCORE: 41
ADLS_ACUITY_SCORE: 41
ADLS_ACUITY_SCORE: 43

## 2024-08-01 NOTE — PROGRESS NOTES
CV ICU PROGRESS NOTE    Jorje Hutchinson  [id number]  Admitted: 7/30/2024  5:30 AM      ASSESSMENT: Jorje Hutchinson is a 74 year old male with PMH of CAD sp coronary angioplasty w/stent placement of circumflex coronary artery in 2002, HTN, HLD, SVT, HERRERA, CKD, T2DM, OA, prostate cancer, and worsening morbid obesity. He presents to [facility] for CORONARY ARTERY BYPASS GRAFT x2 (LIMA-LAD, SVG-LPL), Pulmonary vein isolation with RF ablation, LIGATION, LEFT ATRIAL APPENDAGE, sternal closure with plates and wires.       Overnight Events:  - Arterial line was left in place given orthostatic hypotension to 50s  - Norepinephrine fully weaned off  - Patient declined home CPAP, using AVAPs overnight instead per patient preference     TODAY'S PROGRESS  - Restarting PTA Flomax, house removal expected today  - Trial 40 mg bolus furosemide for diuresis, will re-evaluate in afternoon for fluid response and need for redosing  - Started 10 mg lantus with increase in SSI from moderate to high dose.  - In contact with social work to coordinate transition of care  - Transfusing 1 unit of blood to address downtrending hemoglobin and improve hemodynamics  - ULISES worsening, UA sent for evaluation, possible ATN      CO-MORBIDITIES:   Patient Active Problem List   Diagnosis    Diabetes mellitus, type 2 (H)    Coronary artery disease involving native coronary artery of native heart, unspecified whether angina present     PLAN:  Neuro/ pain/ sedation:  #Acute Postoperative pain  - Monitor neurological status. Notify the MD for any acute changes in exam.  - Pain: Scheduled tylenol. PRN dilaudid, robaxin  - Discontinued PRN oxycodone on 8/1/2024.    Pulmonary care:   #Obstructive Sleep Apnea, CPAP  #Postoperative ventilation management, resolved  - Extubated on 7/30, tolerated well  - Titrate supplemental oxygen to maintain saturation above 92%.  - Pulmonary hygiene: Incentive spirometer every 15-30 minutes when awake, flutter valve, C&DB  - Home  CPAP at night--declined; used AVAPS    Cardiovascular:  #S/p CABG x2 (LIMA-LAD, SVG-LPL), Pulmonary vein isolation with RF ablation, LIGATION, LEFT ATRIAL APPENDAGE, sternal closure with plates and wires on 7/30/24 by Dr. Wilson  #History of CAD sp coronary angioplasty w/stent of circumflex coronary a in 2002  #HTN  #HLD  #Supraventricular tachycardia  #Post-op vasoplegia  #Hx of a fib  Recent echo on 6/20/24 with LVEF of 60-65%  - Monitor hemodynamic status.  - Goal MAP>65, SBP<130  - Restart  PTA rosuvastatin 20, asa 81 mg  - BB hold  - Epi discontinued  - Norepi gtt, wean as tolerated  - Decrease pacing as tolerated  - PTA meds: clopidogrel 75 mg am, diltiazem 30 mg prn , diltiazem ER 120mg every day, losartan 25 mg at bedtime, sotalol 80 mg qd, spironolactone 25 mg every day, torsemide 20 mg at bedtime to be held    GI care/ Nutrition:   #Concern for protein-calorie malnutrition  - Diet: Combo of moderate consistent CHO diet and low saturated fat sodium less than 2400 mg  - Discontinued protonix as not PTA med  - Continue bowel regimen: miralax, senna, PRN milk of mag    Renal/ Fluid Balance/ Electrolytes:   #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  #CKD w/o dialysis  #Acute Kidney Injury  BL creat appears to be ~ 1.42  - Strict I/O, daily weights  - UA sent on 8/1/2024 to assess ULISES and possible ATN, likely 2/2 bypass during surgery  - Avoid/limit nephrotoxins as able  - Replete lytes PRN per protocol  - 1x furosemide 40 mg on 8/1/2024 for diuresis  - Restarting PTA tamsulosin 0.4 mg on 8/1/2024  - Oxybutynin ER 10 mg every day to be held  - Daily BMP, hepatic panel, phos, mag, lactate until downtrend  - Trend chest tube output              -If >400ml/hr in first hour post-op or >200 ml/hr during the first two hours, please page CVICU team.  - Clopidogrel w/ continued bleeding of prostate 2/2 prior radiation therapy/proctitis. HELD    Endocrine:  Stress-induced hyperglycemia  T2DM, insulin dependent  Preop A1c  6.7% on 7/1/24  - Insulin gtt discontinued  - Increased SSI from medium to high dose, starting 10 of lantus on 8/1/2024  - Goal BG <180 for optimal healing  - PTA meds: Empagliflozin 10 mg every day, insulin glargine 38u subcutaneous AM to be held    ID/ Antibiotics:  #Stress induced leukocytosis, improving  - Completed perioperative regimen  - Continue to monitor fever curve, WBC and inflammatory markers as appropriate    Heme:  #Stress induced leukocytosis  #Acute blood loss anemia  #Acute blood loss thrombocytopenia  No s/sx active bleeding  - Continue to monitor  - Daily CBC, Coag panel  - Slight hgb and platelet drop likely dilutional  - Heparin subQ 5000 TID   - Transfusing 1 U PRBCs on 8/1/2024     MSK/ Skin:  #Sternotomy  #Surgical Incision  #Chronic left knee pain  - Sternal precautions  - Postoperative incision management per protocol  - PT/OT/CR - reaching out about SW and transition of care  - PTA allopurinol 100 mg bid held    Prophylaxis:  - Mechanical prophylaxis for DVT  - Chemical DVT prophylaxis - start subcutaneous heparin today POD1    Lines/ tubes/ drains:  - RIJ CVC with introducer  - Left Radial Arterial Line  - CTs x3 (2 mediastinal, 1 R pleural)  - House - d/c'ing 8/1/24     Disposition:  - CVICU     ICU Checklist  F - Feeding: has moderate carb, low saturated fat diet in place  A - Analgesia: controlled on scheduled tylenol, PRN dilaudid, robaxin  S - Sedation: discontinued, no need  T - Thromboembolic prophylaxis: SQH     H - Head of bed elevated - yes  /U - Ulcer prophylaxis: no longer necessary, protonix discontinued 7/31  G - Glycemic control: increasing insulin regimen to address hyperglycemia  S - SBT: N/A     B - Bowel regimen: miralax and senna in place  I - Indwelling catheter: restarting flomax with plan to remove house after  D - De-escalation of antibiotics: will complete postoperative abx today     Patient seen, findings and plan discussed with CVICU staff.      Maryjane  Jaswinder, MS-4      Nathan Gregorio MD  PGY1 General Surgery    ====================================  SUBJECTIVE  In recliner, feeling better after decreased MAP after moving from bed, pain has been well controlled, appetite has been low but no nausea or abdominal pain, passing gas but no BM yet, no paraesthesias or cold extremities, has been using IS frequently, is sleeping well at night, SOB only when moving from bed to chair, no cough, no fever. Patient's wife at bedside.    OBJECTIVE  1. VITAL SIGNS  Temp:  [97.6  F (36.4  C)-98.5  F (36.9  C)] 98.5  F (36.9  C)  Pulse:  [80] 80  Resp:  [12-20] 18  BP: ()/(53-95) 105/53  MAP:  [48 mmHg-303 mmHg] 69 mmHg  Arterial Line BP: ()/() 119/52  FiO2 (%):  [40 %] 40 %  SpO2:  [89 %-100 %] 100 %  FiO2 (%): 40 %  Resp: 18      2. INTAKE/ OUTPUT  I/O last 3 completed shifts:  In: 3891.28 [P.O.:3280; I.V.:611.28]  Out: 1690 [Urine:1110; Chest Tube:580]    3. PHYSICAL EXAMINATION  GEN:  no acute distress  NEURO:  no focal deficits, moving upper and lower extremities spontaneously  CV:  RRR, no edema, paced on VVI at 80  PULM:  non-labored breathing, no stridor or wheezes, lungs CTA bilaterally, IS to 750  MSK:  extremities warm and well perfused  SKIN:  no rash, sternotomy site c/d/I  CT:  to suction, serosang output, no airleak or crepitus    4. INVESTIGATIONS  Arterial Blood Gases   Recent Labs   Lab 07/31/24  0259 07/30/24  2308 07/30/24  2143 07/30/24  1955   PH 7.34* 7.34* 7.29* 7.34*   PCO2 44 44 51* 45   PO2 129* 140* 133* 145*   HCO3 24 24 25 24     Complete Blood Count   Recent Labs   Lab 08/01/24  0537 08/01/24  0444 07/31/24  0948 07/31/24  0258   WBC 9.2 9.0 15.5* 11.6*   HGB 7.9* 7.9* 9.2* 8.6*   PLT 98* 100* 209 147*     Basic Metabolic Panel  Recent Labs   Lab 08/01/24  0444 07/31/24  2208 07/31/24  1813 07/31/24  1155 07/31/24  0952 07/31/24  0948 07/31/24  0306 07/31/24  0258 07/30/24  2146 07/30/24 2142   *  --   --   --   --  133*   "--  137  --  140   POTASSIUM 5.1  --   --   --   --  4.7  --  4.8  --  4.6   CHLORIDE 99  --   --   --   --  101  --  105  --  108*   CO2 21*  --   --   --   --  20*  --  22  --  23   BUN 40.9*  --   --   --   --  32.8*  --  30.9*  --  29.6*   CR 1.86*  --   --   --   --  1.58*  --  1.46*  --  1.36*   * 188* 209* 161*   < > 153*   < > 129*   < > 133*    < > = values in this interval not displayed.     Liver Function Tests  Recent Labs   Lab 08/01/24 0444 07/31/24  0948 07/31/24 0258 07/30/24 2142   AST 36 51* 53* 46*  46*   ALT <5 8 9 9  9   ALKPHOS 80 82 73 73  73   BILITOTAL 0.4 0.4 0.4 0.6  0.6   ALBUMIN 3.0* 3.3* 3.4* 3.4*  3.4*   INR 1.42* 1.32* 1.27* 1.32*     Pancreatic Enzymes  No lab results found in last 7 days.  Coagulation Profile  Recent Labs   Lab 08/01/24 0444 07/31/24  0948 07/31/24 0258 07/30/24 2142   INR 1.42* 1.32* 1.27* 1.32*   PTT 34 31 28 32     Lactate  Invalid input(s): \"LACTATE\"    5. RADIOLOGY  Recent Results (from the past 24 hour(s))   RAPHAEL with Report    Narrative    Tyrone Barrientos DO     7/30/2024  3:17 PM  Perioperative RAPHAEL Procedure Note    Staff -        Resident/Fellow: Tyrone Barrientos DO       Performed By: fellow  Preanesthesia Checklist:  Patient identified, IV assessed, risks and   benefits discussed, monitors and equipment assessed, procedure being   performed at surgeon's request and anesthesia consent obtained.    RAPHAEL Probe Insertion    Probe Status PRE Insertion: NO obvious damage  Probe type:  Adult 3D  Bite block used:   Oral Airway  Insertion Technique: Jaw Lift  Insertion complications: None obvious  Billing Report:RAPHAEL report by Anesthesiologist (See Separate Report note)  Probe Status POST Removal: NO obvious damage    RAPHAEL Report  General Procedure Information  Images for this study have been archived.  Modalities: Color flow mapping, PW Doppler, CW Doppler, 3D and 2D  Diagnostic indications for RAPHAEL:   Cardiomyopathy, other  Echocardiographic and " Doppler Measurements  Right Ventricle:  Cavity size normal.    Hypertrophy not present.     Thrombus not present.    Global function normal.     Left Ventricle:  Cavity size normal.    Hypertrophy not present.     Thrombus not present.   Global Function normal.       Ventricular Regional Function:  1- Basal Anteroseptal:  normal  2- Basal Anterior:  normal  3- Basal Anterolateral:  normal  4- Basal Inferolateral:  normal  5- Basal Inferior:  normal  6- Basal Inferoseptal:  normal  7- Mid Anteroseptal:  normal  8- Mid Anterior:  normal  9- Mid Anterolateral:  normal  10- Mid Inferolateral:  normal  11- Mid Inferior:  normal  12- Mid Inferoseptal:  normal  13- Apical Anterior:  normal  14- Apical Lateral:  normal  15- Apical Inferior:  normal  16- Apical Septal:  normal  17- Mills:  normal    Valves  Aortic Valve: Annulus normal.  Stenosis not present.  Regurgitation   absent.  Leaflets normal.  Leaflet motions normal.    Mitral Valve: Annulus normal.  Stenosis not present.  Regurgitation   absent.  Leaflets normal.  Leaflet motions normal.    Tricuspid Valve: Annulus normal.  Stenosis not present.  Regurgitation   absent.  Leaflets normal.  Leaflet motions normal.        Aorta: Ascending Aorta: Size normal.  Dissection not present.  Plaque   thickness less than 3 mm.  Mobile plaque not present.    Aortic Arch: Size normal.    Dissection not present.   Plaque thickness   less than 3 mm.   Mobile plaque not present.    Descending Aorta: Size normal.    Dissection not present.   Plaque   thickness less than 3 mm.   Mobile plaque not present.      Right Atrium:  Size normal.   Spontaneous echo contrast not present.     Thrombus not present.   Tumor not present.   Device not present.     Left Atrium: Size normal.  Spontaneous echo contrast not present.    Thrombus not present.  Tumor not present.  Device not present.    Left atrial appendage normal.     Atrial Septum: Intra-atrial septal morphology normal.        Ventricular Septum: Intra-ventricular septum morphology normal.        Other Findings:   Pericardium:  normal. Pleural Effusion:  none. Pulmonary Arteries:    normal. Pulmonary Venous Flow:  normal. Cornoary sinus catheter present.    Post Intervention Findings  Procedure(s) performed:  CABG. Global function:  Unchanged. Regional wall   motion: Unchanged. Surgeon(s) notified of all postintervention findings:   Yes.                 Echocardiogram Comments  Echocardiogram comments:   Pre-CPB:  Grossly normal LV size with preserved systolic function and EF 62% by   biplane assessment. Normal LV diastolic function by E/A 1.4 and lateral e'   10.8 cm/s. Grossly normal RV size with no evidence of systolic dysfunction   by TAPSE 25 mm. STACY velocities > 4 cm/s with no visualization of SEC or   thrombus via 3D assessment. No PFO by CFD. Trace MR. Trileaflet aortic   valve without significant regurgitation or stenosis. No pleural or   pericardial effusion. No echo evidence of aortic dissection. Findings   communicated with surgical team in real time.    Post-CPB:  Globally unchanged biventricular size and function. No new RWMA. MR   trace-mild by visual appearance and 2D PISA EROA 0.06 cm^2. No significant   pleural effusion. No echo evidence of aortic dissection. Findings   communicated with surgical team in real time..   XR Chest Port 1 View    Narrative    Portable chest    INDICATION: Endotracheal tube positioning    COMPARISON: CT chest 7/1/2024    FINDINGS: Low inspiratory volumes. Left basilar thoracostomy tube. No  pneumothorax. Left atrial appendage ligation clip. Sternal fixation.  Endotracheal tube in the mid thoracic trachea. Mediastinal drain  tubing. Right IJ sheath tip in the low right internal jugular vein.  Bandlike densities in the left lung suggest mild edema or subsegmental  atelectasis.    Impression    IMPRESSION: Endotracheal tube presumably in the mid thoracic trachea.  Mild edema/atelectasis  especially in the left lung.    ARMIDA GONZALEZ MD         SYSTEM ID:  [id number]   XR Chest Port 1 View    Impression    RESIDENT PRELIMINARY INTERPRETATION  Impression:   1. Interval removal of endotracheal tube. Otherwise stable support  devices.  2. Continued left greater than right perihilar predominant opacities,  likely mild pulmonary edema and atelectasis.

## 2024-08-01 NOTE — PLAN OF CARE
Goal Outcome Evaluation:      Plan of Care Reviewed With: patient, spouse    Overall Patient Progress: improvingOverall Patient Progress: improving    Outcome Evaluation: Anticipated pt will need TCU placement once medically stable.  RNCC/SW will cont to follow the plan of care.

## 2024-08-01 NOTE — PROGRESS NOTES
Critical Care Attending Progress Note  I, Jeimy Mackenzie MD, PhD saw this patient with the resident and agree with the findings and plan of care as documented in the note. Please see separate note from today for further documentation.     I personally reviewed vital signs, medications, labs and imaging.     Assessment:   Patient is is s/p coronary angioplasty w/ stent placement of the circumflex coronary artery in 2002. He endorses significant GALLARDO. A cath on 5/22/24 demonstrated 70% occulsion of the proximal LAD, 60% distal LAD, 30% proximal LCx, 30% distal LCx, 90 % pRCA. EF 60%. PMH: HERRERA on CPAP, morbid obesity, frailty, SP R TKA, sp radiation to prostate with radiation injury to rectum (clots every couple of days rectally)    7/31: remains on low NE    No active issues overnight. This am orthostatic when getting out of bed with NE temporarily back on. Junctional rhythm in the 60s. Restart Tamsulosin. Add Lantus and increase sliding scale insulin.     Active problems and current treatments include:     Acute postop pain: currently controlled, multimodal analgesia  Respiratory insufficiency: extubated, IS, OOB, CPAP at night  Cardiogenic and vasogenic shock: on NE gtt for goal MAPs >65mmHg, VV paced at 80 (underlying slow junctional in 60s)  ULISES: sCr 1.86 today, still climbing, lasix, send UA, urine NA, urine crea   ID: periop abx  Nutrition: diet  DM 2, hyperglycemia: add lantus 10 BID and increase sliding scale  Lines: MAC, faye, house, CTs, v-wires  PPX: SCDs, subcutaneous heparin  DISPO: CVICU  Family: The wife participated in rounds, all questions answered.        The patient is critically ill.   I personally managed the ventilator, hemodynamics, sedation, analgesia, metabolic abnormalities and nutritional status.    I agree with the assessment and plan. I spent 46 minutes exclusive of procedures evaluating and managing this patient, discussing with the consultants, and updating the patient and family.      Jeimy Mackenzie MD, PhD

## 2024-08-01 NOTE — PLAN OF CARE
Neuro intact. Left ribcage pain well controlled. Levo remains off. A-line pressures correlating well with cuff at rest so a-line BP used overnight, discussed with POLO. Still 100% paced @ 80 bpm. AVAPs overnight per pt preference over his own home cpap. O2 stable on RA at rest. Good cough. Armenta with marginal but adequate output. CVP 11. No BM, + flatus. Stable output in chest tubes. Provider notified of AM labs, specifically CBC and rising creat/lytes- recheck CBC the same.      0645: pt wanting to get to chair this AM. MAPS stable. Upon sitting pt up on edge of bed, bariatric siderail slid off bed and pt began sliding down with it (pt remained on bed but needed to quickly stand to get to chair to avoid falling). MAPs in 40's, pt symptomatic. Levo turned back on, bp recovered fairly quickly. Pt oriented and mentating throughout incident. dayshift RN updated, placed sling under pt and lifted back to bed.     Plan: continue POC

## 2024-08-01 NOTE — PLAN OF CARE
Assumed Cares at 1700    Neuro:A&Ox4, Assist x2, dizziness and lightheadedness with activty, afebrile.     CV:   Rate/rhythm:  V-paced at 80BPM. Intrinsic rhythm check at bedside by Dr. Wilson dropped to 45 on temp pacer, req Requiring 100% pacer support.   Sys goal <140, MAP > 65. CVP 10-12   Abd binder on     Pulm:  CPAP/ PS when asleep to 4L NC  x2 meds  x1 pleural    GI: Reg diet, tolerated clears, denies nausea, advanced to diabetic/cardiac diet    : house in place     Skin: L leg harvest site sternal incision    Drips:   Levo - off

## 2024-08-01 NOTE — CONSULTS
Care Management Initial Consult    General Information  Assessment completed with: Patient, Spouse or significant other, PtVincent and spouse, Cassie  Type of CM/SW Visit: Initial Assessment    Primary Care Provider verified and updated as needed: Yes   Readmission within the last 30 days:           Advance Care Planning: Advance Care Planning Reviewed: no concerns identified        Communication Assessment  Patient's communication style: spoken language (English or Bilingual)    Hearing Difficulty or Deaf: yes   Wear Glasses or Blind: no    Cognitive  Cognitive/Neuro/Behavioral: WDL                      Living Environment:   People in home: spouse  Cassie  Current living Arrangements: house      Able to return to prior arrangements: yes     Family/Social Support:  Care provided by: self, spouse/significant other  Provides care for: no one  Marital Status:   Wife  Cassie       Description of Support System: Supportive, Involved       Current Resources:   Patient receiving home care services: No     Community Resources: None  Equipment currently used at home: walker, rolling (uses walker sometimes)  Supplies currently used at home: None    Employment/Financial:  Employment Status: retired        Financial Concerns: none   Referral to Financial Worker: No    Does the patient's insurance plan have a 3 day qualifying hospital stay waiver?  No    Lifestyle & Psychosocial Needs:  Social Determinants of Health     Food Insecurity: No Food Insecurity (7/8/2023)    Received from [facility]    Hunger Vital Sign     Worried About Running Out of Food in the Last Year: Never true     Ran Out of Food in the Last Year: Never true   Depression: Not at risk (7/1/2024)    PHQ-2     PHQ-2 Score: 0   Housing Stability: Low Risk  (7/8/2023)    Received from [facility]    Housing Stability     What is your living situation today?: I have a steady place to live   Tobacco Use: Low Risk  (7/30/2024)    Patient History     Smoking Tobacco  Use: Never     Smokeless Tobacco Use: Never     Passive Exposure: Not on file   Financial Resource Strain: Low Risk  (7/8/2023)    [facility]    Overall Financial Resource Strain (CARDIA)     Difficulty of Paying Living Expenses: Not hard at all   Alcohol Use: Not on file   Transportation Needs: No Transportation Needs (7/8/2023)    Received from [facility]    PRAPARE - Transportation     Lack of Transportation (Medical): No     Lack of Transportation (Non-Medical): No   Physical Activity: Inactive (7/8/2023)    Received from [facility]    Exercise Vital Sign     Days of Exercise per Week: 0 days     Minutes of Exercise per Session: 0 min   Interpersonal Safety: Unknown (7/8/2023)    Received from [facility]    Humiliation, Afraid, Rape, and Kick questionnaire     Fear of Current or Ex-Partner: No     Emotionally Abused: Not on file     Physically Abused: No     Sexually Abused: No   Stress: Not on file   Social Connections: Not on file   Health Literacy: Not on file       Functional Status:  Prior to admission patient needed assistance:   Dependent ADLs:: Independent (uses walker sometimes)  Dependent IADLs:: Independent     Mental Health Status:  Mental Health Status: No Current Concerns       Chemical Dependency Status:  Chemical Dependency Status: No Current Concerns           Values/Beliefs:  Spiritual, Cultural Beliefs, Orthodoxy Practices, Values that affect care:               Additional Information:  Pt is s/p CABG X 2 on 7/30/24 and extubated the same day.    RNCC visited pt and spouse to introduce RNCC role, complete elevated risk readmission assessment and discuss about the discharge plan.  Pt and spouse stated the team have been updating them well about the plan of care.  Pt lives with spouse in [place].  Pt was ind. with most of his cares and mobility prior hospitalization.  Pt was not receiving any home care or community care service prior hospitalization.  RNCC discussed about TCU vs  home with home care vs home with OP rehab discharge options.  PT/OT evaluated pt and TCU is rec. at this time.  Pt and spouse are in agreement with TUC placement.  Pt preference is to stay locally for TCU placement, possibly [facility].  RNCC informed pt and spouse that SW will assist with TCU placement once pt is medically stable.  Pt and spouse agreed.  Pt spouse is staying locally at the hotel.  RNCC will cont to follow the plan of care.      Beronica Anne RN, PHN, BSN  4A and 4E/ ICU  Care Coordinator  Phone: [phone]  Pager: [phone]      SEARCHABLE in [facility] - search CARE COORDINATOR       [place] [facility] [place] ([phone]) Saturday & Sunday; ([phone]) [facility] Recognized Holidays     Units: 5A Onc Vocera & 5C Vocera Pager: [phone]    Units: 6B Vocera & 6C Vocera  Pager: [phone]    Units: 7A SOT RNCC Vocera, 7B Med Surg Vocera, & 7C Med Surg Vocera  Pager: [phone]    Units: 6A Vocera & 4A CVICU Vocera, 4C MICU Vocera, and 4E SICU Vocera   Pager: [phone]    Units: 5 Ortho Vocera & 5 Med Surg Vocera  Pager: [phone]    Units: 6 Med Surg Vocera & 8 Med Surg Vocera  Pager [phone]

## 2024-08-02 ENCOUNTER — APPOINTMENT (OUTPATIENT)
Dept: GENERAL RADIOLOGY | Facility: CLINIC | Age: 74
DRG: 233 | End: 2024-08-02
Attending: SURGERY
Payer: MEDICARE

## 2024-08-02 ENCOUNTER — APPOINTMENT (OUTPATIENT)
Dept: OCCUPATIONAL THERAPY | Facility: CLINIC | Age: 74
DRG: 233 | End: 2024-08-02
Attending: SURGERY
Payer: MEDICARE

## 2024-08-02 ENCOUNTER — APPOINTMENT (OUTPATIENT)
Dept: PHYSICAL THERAPY | Facility: CLINIC | Age: 74
DRG: 233 | End: 2024-08-02
Attending: SURGERY
Payer: MEDICARE

## 2024-08-02 LAB
ABO/RH(D): NORMAL
ALBUMIN SERPL BCG-MCNC: 3 G/DL (ref 3.5–5.2)
ALP SERPL-CCNC: 92 U/L (ref 40–150)
ALT SERPL W P-5'-P-CCNC: <5 U/L (ref 0–70)
ANION GAP SERPL CALCULATED.3IONS-SCNC: 7 MMOL/L (ref 7–15)
ANION GAP SERPL CALCULATED.3IONS-SCNC: 9 MMOL/L (ref 7–15)
ANTIBODY SCREEN: NEGATIVE
APTT PPP: 35 SECONDS (ref 22–38)
AST SERPL W P-5'-P-CCNC: 22 U/L (ref 0–45)
BILIRUB DIRECT SERPL-MCNC: 0.24 MG/DL (ref 0–0.3)
BILIRUB SERPL-MCNC: 0.5 MG/DL
BUN SERPL-MCNC: 49.6 MG/DL (ref 8–23)
BUN SERPL-MCNC: 51.7 MG/DL (ref 8–23)
CALCIUM SERPL-MCNC: 8 MG/DL (ref 8.8–10.4)
CALCIUM SERPL-MCNC: 8.4 MG/DL (ref 8.8–10.4)
CHLORIDE SERPL-SCNC: 96 MMOL/L (ref 98–107)
CHLORIDE SERPL-SCNC: 98 MMOL/L (ref 98–107)
CREAT SERPL-MCNC: 1.62 MG/DL (ref 0.67–1.17)
CREAT SERPL-MCNC: 1.85 MG/DL (ref 0.67–1.17)
EGFRCR SERPLBLD CKD-EPI 2021: 38 ML/MIN/1.73M2
EGFRCR SERPLBLD CKD-EPI 2021: 44 ML/MIN/1.73M2
ERYTHROCYTE [DISTWIDTH] IN BLOOD BY AUTOMATED COUNT: 18.8 % (ref 10–15)
FIBRINOGEN PPP-MCNC: 624 MG/DL (ref 170–510)
GLUCOSE BLDC GLUCOMTR-MCNC: 211 MG/DL (ref 70–99)
GLUCOSE BLDC GLUCOMTR-MCNC: 234 MG/DL (ref 70–99)
GLUCOSE BLDC GLUCOMTR-MCNC: 240 MG/DL (ref 70–99)
GLUCOSE BLDC GLUCOMTR-MCNC: 257 MG/DL (ref 70–99)
GLUCOSE BLDC GLUCOMTR-MCNC: 258 MG/DL (ref 70–99)
GLUCOSE SERPL-MCNC: 253 MG/DL (ref 70–99)
GLUCOSE SERPL-MCNC: 264 MG/DL (ref 70–99)
HCO3 SERPL-SCNC: 22 MMOL/L (ref 22–29)
HCO3 SERPL-SCNC: 23 MMOL/L (ref 22–29)
HCT VFR BLD AUTO: 26.5 % (ref 40–53)
HGB BLD-MCNC: 8.7 G/DL (ref 13.3–17.7)
INR PPP: 1.34 (ref 0.85–1.15)
MAGNESIUM SERPL-MCNC: 2.8 MG/DL (ref 1.7–2.3)
MCH RBC QN AUTO: 29.6 PG (ref 26.5–33)
MCHC RBC AUTO-ENTMCNC: 32.8 G/DL (ref 31.5–36.5)
MCV RBC AUTO: 90 FL (ref 78–100)
PHOSPHATE SERPL-MCNC: 3.7 MG/DL (ref 2.5–4.5)
PLATELET # BLD AUTO: 100 10E3/UL (ref 150–450)
POTASSIUM SERPL-SCNC: 4.8 MMOL/L (ref 3.4–5.3)
POTASSIUM SERPL-SCNC: 5.1 MMOL/L (ref 3.4–5.3)
PROT SERPL-MCNC: 5.3 G/DL (ref 6.4–8.3)
RBC # BLD AUTO: 2.94 10E6/UL (ref 4.4–5.9)
SODIUM SERPL-SCNC: 127 MMOL/L (ref 135–145)
SODIUM SERPL-SCNC: 128 MMOL/L (ref 135–145)
SODIUM UR-SCNC: 44 MMOL/L
SPECIMEN EXPIRATION DATE: NORMAL
WBC # BLD AUTO: 8.5 10E3/UL (ref 4–11)

## 2024-08-02 PROCEDURE — 99233 SBSQ HOSP IP/OBS HIGH 50: CPT | Mod: 24 | Performed by: ANESTHESIOLOGY

## 2024-08-02 PROCEDURE — 80053 COMPREHEN METABOLIC PANEL: CPT | Performed by: PHYSICIAN ASSISTANT

## 2024-08-02 PROCEDURE — 97535 SELF CARE MNGMENT TRAINING: CPT | Mod: GO

## 2024-08-02 PROCEDURE — 94660 CPAP INITIATION&MGMT: CPT

## 2024-08-02 PROCEDURE — 250N000013 HC RX MED GY IP 250 OP 250 PS 637: Performed by: SURGERY

## 2024-08-02 PROCEDURE — 86900 BLOOD TYPING SEROLOGIC ABO: CPT | Performed by: SURGERY

## 2024-08-02 PROCEDURE — 250N000011 HC RX IP 250 OP 636

## 2024-08-02 PROCEDURE — 999N000248 HC STATISTIC IV INSERT WITH US BY RN

## 2024-08-02 PROCEDURE — 85384 FIBRINOGEN ACTIVITY: CPT | Performed by: PHYSICIAN ASSISTANT

## 2024-08-02 PROCEDURE — 83735 ASSAY OF MAGNESIUM: CPT | Performed by: PHYSICIAN ASSISTANT

## 2024-08-02 PROCEDURE — 85610 PROTHROMBIN TIME: CPT | Performed by: PHYSICIAN ASSISTANT

## 2024-08-02 PROCEDURE — 84100 ASSAY OF PHOSPHORUS: CPT | Performed by: PHYSICIAN ASSISTANT

## 2024-08-02 PROCEDURE — 82248 BILIRUBIN DIRECT: CPT | Performed by: PHYSICIAN ASSISTANT

## 2024-08-02 PROCEDURE — 99222 1ST HOSP IP/OBS MODERATE 55: CPT | Performed by: INTERNAL MEDICINE

## 2024-08-02 PROCEDURE — 250N000013 HC RX MED GY IP 250 OP 250 PS 637

## 2024-08-02 PROCEDURE — 71045 X-RAY EXAM CHEST 1 VIEW: CPT | Mod: 26 | Performed by: RADIOLOGY

## 2024-08-02 PROCEDURE — 999N000157 HC STATISTIC RCP TIME EA 10 MIN

## 2024-08-02 PROCEDURE — 71045 X-RAY EXAM CHEST 1 VIEW: CPT

## 2024-08-02 PROCEDURE — 85730 THROMBOPLASTIN TIME PARTIAL: CPT | Performed by: PHYSICIAN ASSISTANT

## 2024-08-02 PROCEDURE — 97530 THERAPEUTIC ACTIVITIES: CPT | Mod: GP | Performed by: PHYSICAL THERAPIST

## 2024-08-02 PROCEDURE — 84300 ASSAY OF URINE SODIUM: CPT

## 2024-08-02 PROCEDURE — 200N000002 HC R&B ICU UMMC

## 2024-08-02 PROCEDURE — 250N000013 HC RX MED GY IP 250 OP 250 PS 637: Performed by: PHYSICIAN ASSISTANT

## 2024-08-02 PROCEDURE — 85027 COMPLETE CBC AUTOMATED: CPT | Performed by: PHYSICIAN ASSISTANT

## 2024-08-02 PROCEDURE — 250N000011 HC RX IP 250 OP 636: Performed by: SURGERY

## 2024-08-02 RX ORDER — LANOLIN ALCOHOL/MO/W.PET/CERES
3 CREAM (GRAM) TOPICAL
Status: DISCONTINUED | OUTPATIENT
Start: 2024-08-02 | End: 2024-08-03

## 2024-08-02 RX ORDER — FLUTICASONE PROPIONATE 50 MCG
1 SPRAY, SUSPENSION (ML) NASAL DAILY
Status: DISCONTINUED | OUTPATIENT
Start: 2024-08-03 | End: 2024-08-02

## 2024-08-02 RX ORDER — FLUTICASONE PROPIONATE 50 MCG
1 SPRAY, SUSPENSION (ML) NASAL EVERY EVENING
Status: DISCONTINUED | OUTPATIENT
Start: 2024-08-02 | End: 2024-08-10 | Stop reason: HOSPADM

## 2024-08-02 RX ORDER — FUROSEMIDE 10 MG/ML
60 INJECTION INTRAMUSCULAR; INTRAVENOUS ONCE
Status: COMPLETED | OUTPATIENT
Start: 2024-08-02 | End: 2024-08-02

## 2024-08-02 RX ADMIN — INSULIN ASPART 4 UNITS: 100 INJECTION, SOLUTION INTRAVENOUS; SUBCUTANEOUS at 13:45

## 2024-08-02 RX ADMIN — ASPIRIN 81 MG CHEWABLE TABLET 162 MG: 81 TABLET CHEWABLE at 08:28

## 2024-08-02 RX ADMIN — HEPARIN SODIUM 5000 UNITS: 5000 INJECTION, SOLUTION INTRAVENOUS; SUBCUTANEOUS at 20:44

## 2024-08-02 RX ADMIN — Medication 3 MG: at 23:37

## 2024-08-02 RX ADMIN — HEPARIN SODIUM 5000 UNITS: 5000 INJECTION, SOLUTION INTRAVENOUS; SUBCUTANEOUS at 05:16

## 2024-08-02 RX ADMIN — TAMSULOSIN HYDROCHLORIDE 0.4 MG: 0.4 CAPSULE ORAL at 08:27

## 2024-08-02 RX ADMIN — FUROSEMIDE 60 MG: 10 INJECTION, SOLUTION INTRAMUSCULAR; INTRAVENOUS at 05:44

## 2024-08-02 RX ADMIN — METHOCARBAMOL 500 MG: 500 TABLET ORAL at 01:08

## 2024-08-02 RX ADMIN — HEPARIN SODIUM 5000 UNITS: 5000 INJECTION, SOLUTION INTRAVENOUS; SUBCUTANEOUS at 13:04

## 2024-08-02 RX ADMIN — INSULIN ASPART 5 UNITS: 100 INJECTION, SOLUTION INTRAVENOUS; SUBCUTANEOUS at 18:12

## 2024-08-02 RX ADMIN — ROSUVASTATIN CALCIUM 20 MG: 20 TABLET, FILM COATED ORAL at 08:28

## 2024-08-02 RX ADMIN — ACETAMINOPHEN 975 MG: 325 TABLET, FILM COATED ORAL at 08:26

## 2024-08-02 RX ADMIN — FLUTICASONE PROPIONATE 1 SPRAY: 50 SPRAY, METERED NASAL at 23:37

## 2024-08-02 RX ADMIN — ACETAMINOPHEN 975 MG: 325 TABLET, FILM COATED ORAL at 01:07

## 2024-08-02 RX ADMIN — INSULIN ASPART 5 UNITS: 100 INJECTION, SOLUTION INTRAVENOUS; SUBCUTANEOUS at 09:00

## 2024-08-02 ASSESSMENT — ACTIVITIES OF DAILY LIVING (ADL)
ADLS_ACUITY_SCORE: 37
ADLS_ACUITY_SCORE: 43
ADLS_ACUITY_SCORE: 39
ADLS_ACUITY_SCORE: 43
ADLS_ACUITY_SCORE: 39
ADLS_ACUITY_SCORE: 43
ADLS_ACUITY_SCORE: 39
ADLS_ACUITY_SCORE: 37
ADLS_ACUITY_SCORE: 43
ADLS_ACUITY_SCORE: 43
ADLS_ACUITY_SCORE: 37
ADLS_ACUITY_SCORE: 37
ADLS_ACUITY_SCORE: 43
ADLS_ACUITY_SCORE: 37
ADLS_ACUITY_SCORE: 43
ADLS_ACUITY_SCORE: 43
ADLS_ACUITY_SCORE: 39

## 2024-08-02 NOTE — PLAN OF CARE
ICU End of Shift Summary. See flowsheets for vital signs and detailed assessment.    Changes this shift:   Neuro: intact, denies pain, controlled with scheduled tylenol     CV:   Rate/rhythm:  V-paced at 70 BPM.  Sys goal <140, MAP > 65 CVP 12  Abd binder off while sleeping.     Pulm: 4-5 LPM NC while awake    x2 meds  x1 pleural    GI: diabetic/cardiac diet, poor appetite. Milk of mag given, No BM    : house, lasix dose x1, unimpressive, afternoon dose not given due to hypotensive episode with transferring.     Skin: LLE graft site, sternal incision, CT sites     Drips:   Levo - off    Labs: hgb 7.9, transfused 1 unit of RBC's    Plan: Continue with POC

## 2024-08-02 NOTE — PLAN OF CARE
Neuro: oriented x4. Robaxin x1 for ribcage pain, effective. Afebrile    CV: remains off levo. 100% V paced @ 70    Resp: endorses worsening SOB compared to previous days, no increase in O2 requirements from day shift, LS are clear. AVAPs overnight, trialed his home cpap again but did not tolerate d/t nasal congestion. 60mg IV lasix given this AM    GI: + flatus, no BM. Poor appetite, likely would benefit from protein supplements     : house with auop, 60-80cc/hr, diuretics as mentioned    Plan: mobilize pt as able, frequent IS use, continue with poc

## 2024-08-02 NOTE — CONSULTS
Cardiology Consult Note     Date of Service: 08/02/2024  Patient: Jorje Hutchinson  MRN: [id number]  Admission Date: 7/30/2024  Hospital Day: 3    Reason for Consult: Evaluate for Pacemaker implantation post CABG    Assessment:   Jorje Hutchinson is a 74-year-old male, history of CAD s/p recent two-vessel CABG (LIMA-LAD, SVG-LPL) and PVI isolation with RF ablation and ligation of left atrial appendage 7/30/2024, hypertension, hyperlipidemia, HERRERA, type 2 diabetes, who underwent recent CABG with hospital course complicated by a slow junctional escape rhythm for which he is currently 100% V paced at 70 bpm via epicardial pacing wires.  EP is consulted for consideration of permanent pacemaker implantation.    Pre-CABG baseline ECG reviewed with evidence of conduction system disease including first-degree AV block and right bundle branch block.    Post procedurally, he has been pacemaker dependent with temporary pacemaker at VVI 70 bpm.  Discussed with primary team and there is no urgent concerns as the temporary pacemaker is functioning appropriately.    Underlying escape rhythm appears to be a junctional rhythm with rates around 50 bpm.    We will tentatively plan for pacemaker implantation on Monday assuming patient continues to have slow junctional escape rhythm without recovery of native conduction.  Please do not hesitate to reach out to us if urgent concerns develop over the weekend.     Patient staffed with attending physician, Dr. Anish Johnson.    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  Please do not hesitate to reach out to us if urgent concerns develop over the weekend.     Patient was seen and plan of care was discussed with attending physician  Dr. Johnson. Final recommendations pending Attending Attestation. Please don't hesitate to contact our team with any additional questions or concerns.    Vinh Sher MD  Cardiology Fellow PGY4    I very much appreciated the opportunity to see and assess Jorje DEBBIE Hutchinson in the hospital with CV Fellow Dr Sher. The note above summarizes my findings and current recommendations.  Please do not hesitate to contact my office if you have any questions or concerns.      Anish Johnson MD  Cardiac Arrhythmia Service  [facility]  [phone]     Subjective   History of Present Illness:    74-year-old male, history of CAD s/p recent two-vessel CABG (LIMA-LAD, SVG-LPL) and PVI isolation with RF ablation and ligation of left atrial appendage 7/30/2024, hypertension, hyperlipidemia, HERRERA, type 2 diabetes, who underwent recent CABG with hospital course complicated by a slow junctional rhythm for which she is currently 100% V paced at 70 bpm via temporary epicardial pacing wires. EP is consulted for consideration of permanent pacemaker implantation.    He denies any palpitations or syncope.  No chest pain.  Has some mild dyspnea. He is not currently on any AV jemima agents.       Review of Systems:  A comprehensive ROS was performed and found to be negative or non-contributory with the exception of that noted in the HPI above.    Past Medical History:   Diagnosis Date    CAD (coronary artery disease)     HLD (hyperlipidemia)     HTN (hypertension)     Morbid obesity (H)     HERRERA (obstructive sleep apnea)     Osteoarthritis     Proctitis     radiation induced    Prostate cancer (H)     SVT (supraventricular tachycardia) (H24)      Past Surgical History:   Procedure Laterality Date    AS TOTAL KNEE ARTHROPLASTY Right     BYPASS GRAFT ARTERY CORONARY N/A 7/30/2024    Procedure: CORONARY ARTERY BYPASS GRAFT;  Surgeon: Deniz Wilson MD;  Location: [facility] OR    CARDIAC CATHERIZATION  2002    COLONOSCOPY  2006    CORONARY  ANGIOPLASTY  2002    w/ stent    CV CORONARY ANGIOGRAM  05/22/2024    EXC SKIN BENIG >4CM REMAINDR BODY Right 1989    forearm & neck    MAZE PROCEDURE N/A 7/30/2024    Procedure: WINTRE MAZE PROCEDURE;  Surgeon: Deniz Wilson MD;  Location: [facility] OR    OR LIGATION, LEFT ATRIAL APPENDAGE N/A 7/30/2024    Procedure: LIGATION, LEFT ATRIAL APPENDAGE, OPEN **LATEX ALLERGY**;  Surgeon: Deniz Wilson MD;  [facility]     Social History     Socioeconomic History    Marital status:      Spouse name: None    Number of children: None    Years of education: None    Highest education level: None   Tobacco Use    Smoking status: Never    Smokeless tobacco: Never    Tobacco comments:     Occasional cigar 20+ years ago   Vaping Use    Vaping status: Never Used   Substance and Sexual Activity    Alcohol use: Yes     Comment: 1 per month    Drug use: Not Currently     Social Determinants of Health     Financial Resource Strain: Low Risk  (7/8/2023)    Received from [facility]    Overall Financial Resource Strain (CARDIA)     Difficulty of Paying Living Expenses: Not hard at all   Food Insecurity: No Food Insecurity (7/8/2023)    Received from [facility]    Hunger Vital Sign     Worried About Running Out of Food in the Last Year: Never true     Ran Out of Food in the Last Year: Never true   Transportation Needs: No Transportation Needs (7/8/2023)    Received from [facility]    PRAPARE - Transportation     Lack of Transportation (Medical): No     Lack of Transportation (Non-Medical): No   Physical Activity: Inactive (7/8/2023)    Received from [facility]    Exercise Vital Sign     Days of Exercise per Week: 0 days     Minutes of Exercise per Session: 0 min   Interpersonal Safety: Unknown (7/8/2023)    Received from [facility]    Humiliation, Afraid, Rape, and Kick questionnaire     Fear of Current or Ex-Partner: No     Physically Abused: No     Sexually Abused: No   Housing Stability: Low Risk  (7/8/2023)    Received from  [facility]    Housing Stability     What is your living situation today?: I have a steady place to live      Family History   Problem Relation Age of Onset    Anesthesia Reaction No family hx of     Deep Vein Thrombosis (DVT) No family hx of      Medications Prior to Admission   Medication Sig Dispense Refill Last Dose    allopurinol (ZYLOPRIM) 100 MG tablet Take 100 mg by mouth 2 times daily   7/30/2024    aspirin 81 MG EC tablet Take 81 mg by mouth every morning   7/29/2024    clopidogrel (PLAVIX) 75 MG tablet Take 75 mg by mouth every morning   Past Week    diltiazem (CARDIZEM) 30 MG tablet Take 30 mg by mouth as needed   7/30/2024    diltiazem ER COATED BEADS (CARDIZEM CD/CARTIA XT) 120 MG 24 hr capsule Take 120 mg by mouth every morning   7/30/2024    empagliflozin (JARDIANCE) 10 MG TABS tablet Take 10 mg by mouth every morning   Unknown    exenatide ER (BYDUREON BCISE) 2 MG/0.85ML auto-injector Inject 2 mg Subcutaneous every 7 days Every Sunday   Past Week    insulin glargine (LANTUS SOLOSTAR) 100 UNIT/ML pen Inject 38 Units Subcutaneous every morning   7/29/2024    losartan (COZAAR) 25 MG tablet Take 25 mg by mouth at bedtime   Unknown    magnesium chloride 535 (64 Mg) MG TBEC CR tablet Take 535 mg by mouth 2 times daily   Past Week    oxyBUTYnin ER (DITROPAN XL) 10 MG 24 hr tablet Take 10 mg by mouth every morning   7/30/2024    sotalol (BETAPACE) 80 MG tablet Take 80 mg by mouth every morning   7/30/2024    spironolactone (ALDACTONE) 25 MG tablet Take 25 mg by mouth every morning   7/29/2024    tamsulosin (FLOMAX) 0.4 MG capsule Take 0.4 mg by mouth at bedtime   7/30/2024    torsemide (DEMADEX) 20 MG tablet Take 20 mg by mouth every morning   7/29/2024    triamcinolone (KENALOG) 0.1 % external cream Apply 1 Application topically as needed   Unknown    vitamin D3 (CHOLECALCIFEROL) 10 MCG (400 UNIT) capsule Take 1 capsule by mouth every morning   Past Week     Current Facility-Administered Medications    Medication Dose Route Frequency Provider Last Rate Last Admin    acetaminophen (TYLENOL) tablet 650 mg  650 mg Oral or Feeding Tube Q4H PRN Blayne Hammond MD        aspirin (ASA) chewable tablet 162 mg  162 mg Oral or NG Tube Daily Blayne Hammond MD   162 mg at 08/02/24 0828    bisacodyl (DULCOLAX) suppository 10 mg  10 mg Rectal Daily PRN Blayne Hammond MD        calcium gluconate 1 g in 50 mL in sodium chloride intermittent infusion  1 g Intravenous Once PRN Blayne Hammond MD        calcium gluconate 2 g in  mL intermittent infusion  2 g Intravenous Once PRN Blayne Hammond MD        calcium gluconate 3 g in sodium chloride 0.9 % 100 mL intermittent infusion  3 g Intravenous Once PRN Blayne Hammond MD        glucose gel 15-30 g  15-30 g Oral Q15 Min PRN Blayne Hammond MD        Or    dextrose 50 % injection 25-50 mL  25-50 mL Intravenous Q15 Min PRN Blayne Hammond MD        Or    glucagon injection 1 mg  1 mg Subcutaneous Q15 Min PRN Blayne Hammond MD        heparin ANTICOAGULANT injection 5,000 Units  5,000 Units Subcutaneous Q8H Blayne Hammond MD   5,000 Units at 08/02/24 1304    hydrALAZINE (APRESOLINE) injection 10 mg  10 mg Intravenous Q30 Min PRN Blayne Hammond MD        insulin aspart (NovoLOG) injection (RAPID ACTING)  1-10 Units Subcutaneous TID AC Kevin Aguila PA-C   4 Units at 08/02/24 1345    insulin aspart (NovoLOG) injection (RAPID ACTING)  1-7 Units Subcutaneous At Bedtime Kevin Aguila PA-C   3 Units at 08/01/24 2216    insulin glargine (LANTUS PEN) injection 15 Units  15 Units Subcutaneous BID Deniz Wilson MD   15 Units at 08/02/24 0836    lidocaine (LMX4) cream   Topical Q1H PRN Blayne Hammond MD        lidocaine 1 % 0.1-1 mL  0.1-1 mL Other Q1H PRN Blayne Hammond MD        magnesium hydroxide (MILK OF MAGNESIA) suspension 30 mL  30 mL Oral or Feeding Tube Daily PRN Blayne Hammond MD   30 mL at  08/01/24 1717    methocarbamol (ROBAXIN) tablet 500 mg  500 mg Oral or Feeding Tube Q6H PRN Blayne Hammond MD   500 mg at 08/02/24 0108    naloxone (NARCAN) injection 0.2 mg  0.2 mg Intravenous Q2 Min PRN Deniz Wilson MD        Or    naloxone (NARCAN) injection 0.4 mg  0.4 mg Intravenous Q2 Min PRN Deniz Wilson MD        Or    naloxone (NARCAN) injection 0.2 mg  0.2 mg Intramuscular Q2 Min PRN Deniz Wilson MD        Or    naloxone (NARCAN) injection 0.4 mg  0.4 mg Intramuscular Q2 Min PRN Deniz Wilson MD        ondansetron (ZOFRAN ODT) ODT tab 4 mg  4 mg Oral Q6H PRN Blayne Hammond MD        Or    ondansetron (ZOFRAN) injection 4 mg  4 mg Intravenous Q6H PRN Blayne Hammond MD        Patient may continue current oral medications   Does not apply Continuous PRN Neal Bray MD        polyethylene glycol (MIRALAX) Packet 17 g  17 g Oral or Feeding Tube Daily PRN Tony Lakhani MD        polyethylene glycol (MIRALAX) Packet 17 g  17 g Oral or Feeding Tube Daily Blayne Hammond MD   17 g at 08/01/24 0902    prochlorperazine (COMPAZINE) injection 5 mg  5 mg Intravenous Q6H PRN Blayne Hammond MD        Or    prochlorperazine (COMPAZINE) tablet 5 mg  5 mg Oral or Feeding Tube Q6H PRN Blayne Hammond MD        Reason beta blocker order not selected   Does not apply DOES NOT GO TO Blayne Campbell MD        rosuvastatin (CRESTOR) tablet 20 mg  20 mg Oral Daily Kevin Aguila PA-C   20 mg at 08/02/24 0828    senna-docusate (SENOKOT-S/PERICOLACE) 8.6-50 MG per tablet 1 tablet  1 tablet Oral or Feeding Tube BID PRN Tony Lakhani MD        Or    senna-docusate (SENOKOT-S/PERICOLACE) 8.6-50 MG per tablet 2 tablet  2 tablet Oral or Feeding Tube BID PRN Tony Lakhani MD        senna-docusate (SENOKOT-S/PERICOLACE) 8.6-50 MG per tablet 1 tablet  1 tablet Oral or Feeding Tube BID Blayne Hammond MD   1 tablet at 08/01/24 2052    sodium chloride (PF) 0.9% PF flush 3 mL   "3 mL Intracatheter Q8H Blayne Hammond MD   3 mL at 08/02/24 0846    sodium chloride (PF) 0.9% PF flush 3 mL  3 mL Intracatheter q1 min prn Blayne Hammond MD        tamsulosin (FLOMAX) capsule 0.4 mg  0.4 mg Oral Daily Kevin Aguila PA-C   0.4 mg at 08/02/24 0827       Objective   Physical Exam:    Blood pressure (!) 143/68, pulse 70, temperature 98.5  F (36.9  C), temperature source Oral, resp. rate 20, height 1.74 m (5' 8.5\"), weight (!) 151.3 kg (333 lb 8.9 oz), SpO2 93%.    Exam:  General: NAD, sitting up in bed, conversant  HEENT: no scleral icterus or injection  CARDIAC: RRR, no murmurs.  Extremities warm and well-perfused, trace lower extremity edema.  Temporary pacemaker in place, VVI 70  RESP: On supplemental oxygen, faint scattered crackles anterior lung fields, no accessory muscle use, speaking in full sentences  EXTREMITIES: Extremities warm well-perfused, trace lower extremity edema bilaterally  SKIN: No acute lesions appreciated  NEURO: No focal deficits, answers questions appropriately    Labs & Studies: I personally reviewed the following studies:  CMP  Recent Labs   Lab 08/02/24  1310 08/02/24  1309 08/02/24  0831 08/02/24  0409 08/02/24  0406 08/01/24  0858 08/01/24  0444 07/31/24  0952 07/31/24  0948 07/31/24  0306 07/31/24  0258   *  --   --   --  127*  --  130*  --  133*  --  137   POTASSIUM 4.8  --   --   --  5.1  --  5.1  --  4.7  --  4.8   CHLORIDE 98  --   --   --  96*  --  99  --  101  --  105   CO2 23  --   --   --  22  --  21*  --  20*  --  22   ANIONGAP 7  --   --   --  9  --  10  --  12  --  10   * 234* 240* 258* 264*   < > 220*   < > 153*   < > 129*   BUN 49.6*  --   --   --  51.7*  --  40.9*  --  32.8*  --  30.9*   CR 1.62*  --   --   --  1.85*  --  1.86*  --  1.58*  --  1.46*   GFRESTIMATED 44*  --   --   --  38*  --  37*  --  45*  --  50*   VISH 8.0*  --   --   --  8.4*  --  8.4*  --  8.8  --  8.7*   MAG  --   --   --   --  2.8*  --  2.5*  --  2.5*  --  " 2.6*   PHOS  --   --   --   --  3.7  --  5.3*  --  4.6*  --  4.7*   PROTTOTAL  --   --   --   --  5.3*  --  5.0*  --  5.4*  --  5.2*   ALBUMIN  --   --   --   --  3.0*  --  3.0*  --  3.3*  --  3.4*   BILITOTAL  --   --   --   --  0.5  --  0.4  --  0.4  --  0.4   ALKPHOS  --   --   --   --  92  --  80  --  82  --  73   AST  --   --   --   --  22  --  36  --  51*  --  53*   ALT  --   --   --   --  <5  --  <5  --  8  --  9    < > = values in this interval not displayed.     CBC  Recent Labs   Lab 08/02/24  0406 08/01/24  0537 08/01/24 0444 07/31/24  0948   WBC 8.5 9.2 9.0 15.5*   RBC 2.94* 2.64* 2.68* 3.14*   HGB 8.7* 7.9* 7.9* 9.2*   HCT 26.5* 24.2* 24.4* 28.2*   MCV 90 92 91 90   MCH 29.6 29.9 29.5 29.3   MCHC 32.8 32.6 32.4 32.6   RDW 18.8* 19.7* 19.9* 19.8*   * 98* 100* 209     INR  Recent Labs   Lab 08/02/24  0406 08/01/24  0444 07/31/24  0948 07/31/24  0258   INR 1.34* 1.42* 1.32* 1.27*            No data to display                Telemetry Review 8/2/2024        ECG 7/30/24          Baseline ECG pre-CABG      Imaging:  Reviewed

## 2024-08-02 NOTE — PROGRESS NOTES
CV ICU PROGRESS NOTE    Jorje Hutchinson  [id number]  Admitted: 7/30/2024  5:30 AM      ASSESSMENT: Jorje Hutchinson is a 74 year old male with PMH of CAD sp coronary angioplasty w/stent placement of circumflex coronary artery in 2002, HTN, HLD, SVT, HERRERA, CKD, T2DM, OA, prostate cancer, and worsening morbid obesity. He presents to [facility] for CORONARY ARTERY BYPASS GRAFT x2 (LIMA-LAD, SVG-LPL), Pulmonary vein isolation with RF ablation, LIGATION, LEFT ATRIAL APPENDAGE, sternal closure with plates and wires.     OVERNIGHT EVENTS:  - Hypotensive episodes yesterday night and while standing up, held lasix 80 mg and rescheduled for this morning (8/2)  - Persistent hyperglycemia despite addition of lantus 10 BID and increase to high dose SSI    TODAY'S PROGRESS:  - 60 mg bolus furosemide for diuresis, will re-evaluate in afternoon for need for redosing  - Persistent hyperglycemia above goal <180. Increased lantus to 15 BID with unchanged SSI dose.  - PT saw in AM and had first time standing/marching without interfering hypotensive episode.  - De-lined, chest tube removal pending eval from EP  - Had first postop BM, increased PO intake with chocolate ensure.    CO-MORBIDITIES:  Patient Active Problem List   Diagnosis    Diabetes mellitus, type 2 (H)    Coronary artery disease involving native coronary artery of native heart, unspecified whether angina present     PLAN:  Neuro/ pain/ sedation:  #Acute Postoperative pain, controlled  - Monitor neurological status. Notify the MD for any acute changes in exam.  - Pain: Scheduled tylenol. PRN dilaudid, robaxin  - Discontinued PRN oxycodone on 8/1/2024.    Pulmonary care:   #Obstructive Sleep Apnea, CPAP  #Postoperative ventilation management, resolved  - Extubated on 7/30, tolerated well  - Titrate supplemental oxygen to maintain saturation above 92%.  - Pulmonary hygiene: Incentive spirometer every 15-30 minutes when awake, flutter valve, C&DB  - Home CPAP at night--declined;  used AVAPS  - CXR 8/2/2024 with increased opacities right lung base, patient encouraged to increase IS use    Cardiovascular:  #S/p CABG x2 (LIMA-LAD, SVG-LPL), Pulmonary vein isolation with RF ablation, LIGATION, LEFT ATRIAL APPENDAGE, sternal closure with plates and wires on 7/30/24 by Dr. Wilson  #History of CAD sp coronary angioplasty w/stent of circumflex coronary a in 2002  #HTN  #HLD  #Supraventricular tachycardia  #Post-op vasoplegia  #Hx of a fib  Recent echo on 6/20/24 with LVEF of 60-65%  - Monitor hemodynamic status.  - Goal MAP>65, SBP<130  - Restarted PTA rosuvastatin 20, asa 81 mg  - BB hold  - Epi, norepi gtts discontinued  - Decrease pacing as tolerated, EP consult for intrinsic recovery  - PTA meds: clopidogrel 75 mg am, diltiazem 30 mg prn , diltiazem ER 120mg every day, losartan 25 mg at bedtime, sotalol 80 mg qd, spironolactone 25 mg every day, torsemide 20 mg at bedtime to be held    GI care/ Nutrition:  #Concern for protein-calorie malnutrition  - Diet: Combo of moderate consistent CHO diet and low saturated fat sodium less than 2400 mg  - Discontinued protonix as not PTA med  - Continue bowel regimen: miralax, senna, PRN milk of mag    Renal/ Fluid Balance/ Electrolytes:   #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  #CKD w/o dialysis  #Acute Kidney Injury  BL creat appears to be ~ 1.42  - Strict I/O, daily weights  - Avoid/limit nephrotoxins as able  - Replete lytes PRN per protocol  - Restarted PTA tamsulosin 0.4 mg on 8/1/2024  - Oxybutynin ER 10 mg every day to be held  - Daily BMP, hepatic panel, phos, mag, lactate until downtrend  - Trend chest tube output              -If >400ml/hr in first hour post-op or >200 ml/hr during the first two hours, please page CVICU team.  - Clopidogrel w/ continued bleeding of prostate 2/2 prior radiation therapy/proctitis. HELD  - 8/1/2024: 1x furosemide 40 mg for diuresis, UA to assess ULISES and possible ATN likely 2/2 bypass during surgery  - 8/2/2024:  Removing house, 1x 60 mg furosemide for diuresis, fluid balance goal -500 mL to -1 L. Monitoring hyponatremia.    Endocrine:  #Stress-induced hyperglycemia  #T2DM, insulin dependent  Preop A1c 6.7% on 7/1/24  - Insulin gtt discontinued  - Goal BG <180 for optimal healing  - PTA meds: Empagliflozin 10 mg every day, insulin glargine 38u subcutaneous AM to be held  - 8/1/2024: Increased SSI from medium to high dose, starting 10 of lantus BID on 8/1/2024  - 8/2/2024: Increased lantus to 15 BID    ID/ Antibiotics:  #Stress induced leukocytosis, improving  - Completed perioperative regimen  - Continue to monitor fever curve, WBC and inflammatory markers as appropriate    Heme:  #Stress induced leukocytosis  #Acute blood loss anemia, improving  #Acute blood loss thrombocytopenia  No s/sx active bleeding  - Continue to monitor  - Daily CBC, Coag panel  - Slight hgb and platelet drop likely dilutional  - Heparin subQ 5000 TID   - Transfused 1 U PRBCs on 8/1/2024 with noted improvement in Hgb on 8/2/2024     MSK/ Skin:  #Sternotomy  #Surgical Incision  #Chronic left knee pain  - Sternal precautions  - Postoperative incision management per protocol  - PT/OT/CR - SW/RNCC following for transition of care  - PTA allopurinol 100 mg bid held    Prophylaxis:  - Mechanical prophylaxis for DVT  - Chemical DVT prophylaxis - start subcutaneous heparin today POD1    Lines/ tubes/ drains:  - RIJ CVC with introducer  - Left Radial Arterial Line  - CTs x3 (2 mediastinal, 1 R pleural)     Disposition:  - CVICU    ICU Checklist:  F - Feeding: has moderate carb, low saturated fat diet in place  A - Analgesia: controlled on scheduled tylenol, PRN dilaudid, robaxin  S - Sedation: discontinued, no need  T - Thromboembolic prophylaxis: SQH     H - Head of bed elevated - yes  /U - Ulcer prophylaxis: no longer necessary, protonix discontinued 7/31  G - Glycemic control: increasing insulin regimen to address hyperglycemia  S - SBT: N/A     B - Bowel  regimen: miralax and senna in place  I - Indwelling catheter: removing 8/2/2024  D - De-escalation of antibiotics: completed     Patient seen, findings and plan discussed with CVICU staff.      Maryjane San, MS-4    Nathan Gregorio MD  PGY1 General Surgery    ====================================  SUBJECTIVE  In recliner, reports feeling better. Pain well controlled. Appetite low but had a protein shake for dinner last night and is interested in more. No nausea, abdominal pain, paraesthesias, cold extremities, cough, SOB. Has been using IS frequently with daughter and wife helping to encourage. Had BM this morning.    OBJECTIVE  1. VITAL SIGNS  Temp:  [98.1  F (36.7  C)-99.1  F (37.3  C)] 98.2  F (36.8  C)  Pulse:  [70-80] 70  Resp:  [15-20] 18  BP: ()/(44-77) 105/58  MAP:  [48 mmHg-83 mmHg] 71 mmHg  Arterial Line BP: ()/(36-71) 130/51  FiO2 (%):  [40 %] 40 %  SpO2:  [87 %-100 %] 99 %  FiO2 (%): 40 %  Resp: 18      2. INTAKE/ OUTPUT  I/O last 3 completed shifts:  In: 1023 [P.O.:540; I.V.:183]  Out: 1410 [Urine:1130; Chest Tube:280]    3. PHYSICAL EXAMINATION  GEN:  no acute distress  NEURO:  no focal deficits, moving upper and lower extremities spontaneously  CV:  RRR, no edema, paced on VVI at 70  PULM:  non-labored breathing, no stridor or wheezes, lungs CTA bilaterally, IS to 750  MSK:  extremities warm and well perfused  SKIN:  no rash, sternotomy site c/d/I  CT:  to suction, serosang output, no airleak or crepitus, tubes dry    4. INVESTIGATIONS  Arterial Blood Gases   Recent Labs   Lab 07/31/24  0259 07/30/24  2308 07/30/24  2143 07/30/24  1955   PH 7.34* 7.34* 7.29* 7.34*   PCO2 44 44 51* 45   PO2 129* 140* 133* 145*   HCO3 24 24 25 24     Complete Blood Count   Recent Labs   Lab 08/02/24  0406 08/01/24  0537 08/01/24  0444 07/31/24  0948   WBC 8.5 9.2 9.0 15.5*   HGB 8.7* 7.9* 7.9* 9.2*   * 98* 100* 209     Basic Metabolic Panel  Recent Labs   Lab 08/02/24  0409 08/02/24  0406  "08/01/24  2214 08/01/24  1721 08/01/24  0858 08/01/24  0444 07/31/24  0952 07/31/24  0948 07/31/24  0306 07/31/24  0258   NA  --  127*  --   --   --  130*  --  133*  --  137   POTASSIUM  --  5.1  --   --   --  5.1  --  4.7  --  4.8   CHLORIDE  --  96*  --   --   --  99  --  101  --  105   CO2  --  22  --   --   --  21*  --  20*  --  22   BUN  --  51.7*  --   --   --  40.9*  --  32.8*  --  30.9*   CR  --  1.85*  --   --   --  1.86*  --  1.58*  --  1.46*   * 264* 272* 249*   < > 220*   < > 153*   < > 129*    < > = values in this interval not displayed.     Liver Function Tests  Recent Labs   Lab 08/02/24  0406 08/01/24 0444 07/31/24  0948 07/31/24  0258   AST 22 36 51* 53*   ALT <5 <5 8 9   ALKPHOS 92 80 82 73   BILITOTAL 0.5 0.4 0.4 0.4   ALBUMIN 3.0* 3.0* 3.3* 3.4*   INR 1.34* 1.42* 1.32* 1.27*     Pancreatic Enzymes  No lab results found in last 7 days.  Coagulation Profile  Recent Labs   Lab 08/02/24 0406 08/01/24 0444 07/31/24  0948 07/31/24  0258   INR 1.34* 1.42* 1.32* 1.27*   PTT 35 34 31 28     Lactate  Invalid input(s): \"LACTATE\"    5. RADIOLOGY  Recent Results (from the past 24 hour(s))   RAPHAEL with Report    Narrative    Tyrone Barrientos DO     7/30/2024  3:17 PM  Perioperative RAPHAEL Procedure Note    Staff -        Resident/Fellow: Tyrone Barrientos DO       Performed By: fellow  Preanesthesia Checklist:  Patient identified, IV assessed, risks and   benefits discussed, monitors and equipment assessed, procedure being   performed at surgeon's request and anesthesia consent obtained.    RAPHAEL Probe Insertion    Probe Status PRE Insertion: NO obvious damage  Probe type:  Adult 3D  Bite block used:   Oral Airway  Insertion Technique: Jaw Lift  Insertion complications: None obvious  Billing Report:RAPHAEL report by Anesthesiologist (See Separate Report note)  Probe Status POST Removal: NO obvious damage    RAPHAEL Report  General Procedure Information  Images for this study have been archived.  Modalities: Color flow " mapping, PW Doppler, CW Doppler, 3D and 2D  Diagnostic indications for RAPHAEL:   Cardiomyopathy, other  Echocardiographic and Doppler Measurements  Right Ventricle:  Cavity size normal.    Hypertrophy not present.     Thrombus not present.    Global function normal.     Left Ventricle:  Cavity size normal.    Hypertrophy not present.     Thrombus not present.   Global Function normal.       Ventricular Regional Function:  1- Basal Anteroseptal:  normal  2- Basal Anterior:  normal  3- Basal Anterolateral:  normal  4- Basal Inferolateral:  normal  5- Basal Inferior:  normal  6- Basal Inferoseptal:  normal  7- Mid Anteroseptal:  normal  8- Mid Anterior:  normal  9- Mid Anterolateral:  normal  10- Mid Inferolateral:  normal  11- Mid Inferior:  normal  12- Mid Inferoseptal:  normal  13- Apical Anterior:  normal  14- Apical Lateral:  normal  15- Apical Inferior:  normal  16- Apical Septal:  normal  17- Luverne:  normal    Valves  Aortic Valve: Annulus normal.  Stenosis not present.  Regurgitation   absent.  Leaflets normal.  Leaflet motions normal.    Mitral Valve: Annulus normal.  Stenosis not present.  Regurgitation   absent.  Leaflets normal.  Leaflet motions normal.    Tricuspid Valve: Annulus normal.  Stenosis not present.  Regurgitation   absent.  Leaflets normal.  Leaflet motions normal.        Aorta: Ascending Aorta: Size normal.  Dissection not present.  Plaque   thickness less than 3 mm.  Mobile plaque not present.    Aortic Arch: Size normal.    Dissection not present.   Plaque thickness   less than 3 mm.   Mobile plaque not present.    Descending Aorta: Size normal.    Dissection not present.   Plaque   thickness less than 3 mm.   Mobile plaque not present.      Right Atrium:  Size normal.   Spontaneous echo contrast not present.     Thrombus not present.   Tumor not present.   Device not present.     Left Atrium: Size normal.  Spontaneous echo contrast not present.    Thrombus not present.  Tumor not present.   Device not present.    Left atrial appendage normal.     Atrial Septum: Intra-atrial septal morphology normal.       Ventricular Septum: Intra-ventricular septum morphology normal.        Other Findings:   Pericardium:  normal. Pleural Effusion:  none. Pulmonary Arteries:    normal. Pulmonary Venous Flow:  normal. Cornoary sinus catheter present.    Post Intervention Findings  Procedure(s) performed:  CABG. Global function:  Unchanged. Regional wall   motion: Unchanged. Surgeon(s) notified of all postintervention findings:   Yes.                 Echocardiogram Comments  Echocardiogram comments:   Pre-CPB:  Grossly normal LV size with preserved systolic function and EF 62% by   biplane assessment. Normal LV diastolic function by E/A 1.4 and lateral e'   10.8 cm/s. Grossly normal RV size with no evidence of systolic dysfunction   by TAPSE 25 mm. STACY velocities > 4 cm/s with no visualization of SEC or   thrombus via 3D assessment. No PFO by CFD. Trace MR. Trileaflet aortic   valve without significant regurgitation or stenosis. No pleural or   pericardial effusion. No echo evidence of aortic dissection. Findings   communicated with surgical team in real time.    Post-CPB:  Globally unchanged biventricular size and function. No new RWMA. MR   trace-mild by visual appearance and 2D PISA EROA 0.06 cm^2. No significant   pleural effusion. No echo evidence of aortic dissection. Findings   communicated with surgical team in real time..   XR Chest Port 1 View    Narrative    Portable chest    INDICATION: Endotracheal tube positioning    COMPARISON: CT chest 7/1/2024    FINDINGS: Low inspiratory volumes. Left basilar thoracostomy tube. No  pneumothorax. Left atrial appendage ligation clip. Sternal fixation.  Endotracheal tube in the mid thoracic trachea. Mediastinal drain  tubing. Right IJ sheath tip in the low right internal jugular vein.  Bandlike densities in the left lung suggest mild edema or subsegmental  atelectasis.     Impression    IMPRESSION: Endotracheal tube presumably in the mid thoracic trachea.  Mild edema/atelectasis especially in the left lung.    ARMIDA GONZALEZ MD         SYSTEM ID:  [id number]   XR Chest Port 1 View    Impression    RESIDENT PRELIMINARY INTERPRETATION  Impression:   1. Interval removal of endotracheal tube. Otherwise stable support  devices.  2. Continued left greater than right perihilar predominant opacities,  likely mild pulmonary edema and atelectasis.

## 2024-08-02 NOTE — OP NOTE
PREOPERATIVE DIAGNOSIS:  1. Coronary artery disease. 2. Atrial fibrillation  3. 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 Morbid obesity  POSTOPERATIVE DIAGNOSIS:  1. Coronary artery disease. 2. Atrial fibrillation  3. 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 Morbid obesity     PROCEDURES:   1.  Coronary bypass grafting x 2 (left internal mammary artery to left anterior descending artery, saphenous vein graft to left posterolateral artery).  2.  Endoscopic vein harvest.  3. Bilateral pulmonary vein isolation with radiofrequency ablation with Atricure device (modified maze procedure)  4. Left atrial appendage exclusion with 40 mm Atriclip  5. Sternal closure with Kenroy sternal plates     NOTE: An additional 1-2 hours of operating time was required for this patient because of morbid obesity.     SURGEON:  Deniz Wilson MD     ASSISTANTS: MD Vikki Muro PA, Krysten Cruz MD      NOTE: A PA was required for this operation to perform vein harvesting, assisting in performance of the anastomosis as well as the rest of the operation.      OPERATIVE INDICATIONS:  Jorje Hutchinson is a 74 year old male with PMH of CAD sp coronary angioplasty w/stent placement of circumflex coronary artery in 2002, HTN, HLD, SVT, HERRERA, CKD, T2DM, OA, prostate cancer, and morbid obesity.  who presented for CABG.  Decision was made to proceed with coronary artery bypass grafting.  I discussed risks, benefits of surgery with the patient's family including risk of death, stroke, bleeding, wound failure, renal failure, arrhythmias.  He understood and wanted to proceed with surgery.     OPERATIVE FINDINGS:  The patient's sternum was of adequate quality.  Veins obtained were adequate for bypass grafting.  LIMA was adequate quality with good flow.  Vessels bypassed included the LAD and the left posterolateral artery  which were both 2 mm vessel with proximal stenosis.      DESCRIPTION OF PROCEDURE:  The patient was brought  to the operating room in stable condition, put under general anesthesia. The patient's chest, abdomen, lower extremities were prepped and draped in usual manner.  A long segment of saphenous vein was harvested from the left lower extremity via endoscopic vein harvest techniques.   Simultaneously, a median sternotomy was performed.  The left internal mammary artery was harvested from bed and dilated with topical papaverine.  Preparation for cardiopulmonary bypass included ACT-guided heparinization and the administration of Amicar.  Aorta was cannulated with a 22-Occitan arterial cannula via 3-0 Tevdek pursestring pledgeted sutures x 2.  Venous cannula was inserted in right atrium.  Antegrade and retrograde cardioplegic cannulae were placed.  Full cardiopulmonary bypass was initiated.  Aortic pressure was temporarily reduced and the aorta was crossclamped.  One liter of cold blood cardioplegia administered with antegrade and retrograde routes.  Subsequent dose of cardioplegia given at no greater than 20-minute intervals, via the retrograde route as well as via the completed grafts. Bilateral pulmonary vein isolation with radiofrequency ablation was performed with the Atricure device. Reverse saphenous vein was anastomosed end-to-side to an arteriotomy in the mid portion of the LPLA using 7-0 Prolene. A 40 mm Atriclip was placed at the base of the left atrial appendage. Distal end of the left internal mammary artery was anastomosed end-to-side to an arteriotomy mid portion of the left anterior descending artery using 7-0 Prolene.  Proximal ends of saphenous vein graft were anastomosed to one 4 mm aortotomy using 6-0 Prolene. Warm dose of retrograde hotshot cardioplegia was given and with high suction on the aortic root vent, the cross-clamp was released.  Organized rhythm resumed without the need for defibrillations.  Rewarming and reperfusion allowed.  De-airing was confirmed by echography.  The patient gradually  weaned off cardiopulmonary bypass with low dose epinephrine.  Following termination of cardiopulmonary bypass, LV function was normal, and the patient had normal sinus electrocardiogram.  Protamine was given.  All cannulas removed.  Careful hemostasis was obtained.  Two anterior mediastinal and 1 left pleural chest tube was placed.  Sternum was closed with sternal plates and surgical steel wires.  Fascia, subcutaneous tissue, skin of chest were closed in layers.  The patient transferred to ICU in stable critical condition.

## 2024-08-02 NOTE — PLAN OF CARE
Shift Events: A&Ox4, Ax1-2 to chair, denies pain, afebrile. 100% Vpaced @ 70 VVI, SYS < 140 and MAP > 65, no hypotension with activity. 5L NC, CPAP overnight, LS clear/coarse, utilizing IS. Armenta removed, voiding in urinal spontaneously. Cardiac/low card diet plus ensures, Lantus increased to 15 units BID - BS consistently >200. Ctsx3 with adequate output. Fully de-lined.    Plan: Continue to work with PT/OT to increase activity.     For complete assessments and vital signs, please refer to flowsheets.       Goal Outcome Evaluation:    Plan of Care Reviewed With: patient, spouse    Overall Patient Progress: improving    Outcome Evaluation: Increasing acitivity tolerance and no episodes of orthostatic hypotension

## 2024-08-02 NOTE — PROGRESS NOTES
Critical Care Attending Progress Note  I, Jeimy Mackenzie MD, PhD saw this patient with the resident and agree with the findings and plan of care as documented in the note. Please see separate note from today for further documentation.     I personally reviewed vital signs, medications, labs and imaging.     Assessment:   Patient is is s/p coronary angioplasty w/ stent placement of the circumflex coronary artery in 2002. He endorses significant GALLARDO. A cath on 5/22/24 demonstrated 70% occulsion of the proximal LAD, 60% distal LAD, 30% proximal LCx, 30% distal LCx, 90 % pRCA. EF 60%. PMH: HERRERA on CPAP, morbid obesity, frailty, SP R TKA, sp radiation to prostate with radiation injury to rectum (clots every couple of days rectally)    7/31: remains on low NE  8/1: No active issues overnight. This am orthostatic when getting out of bed with NE temporarily back on. Junctional rhythm in the 60s. Restart Tamsulosin. Add Lantus and increase sliding scale insulin.     Overnight hypotensive events when turning. Had BM in am. During the day did well. Got up out of the bed without hypotension. Using IS. Will deline.    Active problems and current treatments include:     Acute postop pain: currently controlled, multimodal analgesia  Respiratory insufficiency: extubated, IS, OOB, CPAP at night  Cardiogenic and vasogenic shock: VV paced at 80 (underlying slow junctional in 60s). EP consulted for possible PPM placement next week.  ULISES: sCr 1.85 today  Acute blood loss anemia: received 1 unit(s) RBC 8/1  ID: periop abx  Nutrition: diet  DM 2, hyperglycemia: increase lantus, SSI  Lines: MAC, faye, house, CTs, v-wires--- deline  PPX: SCDs, subcutaneous heparin  DISPO: CVICU  Family: The wife and daughter participated in rounds, all questions answered.          I personally managed the ventilator, hemodynamics, sedation, analgesia, metabolic abnormalities and nutritional status.    I agree with the assessment and plan. I spent 46 minutes  exclusive of procedures evaluating and managing this patient, discussing with the consultants, and updating the patient and family.     Jeimy Mackenzie MD, PhD

## 2024-08-03 ENCOUNTER — APPOINTMENT (OUTPATIENT)
Dept: GENERAL RADIOLOGY | Facility: CLINIC | Age: 74
DRG: 233 | End: 2024-08-03
Attending: SURGERY
Payer: MEDICARE

## 2024-08-03 LAB
ALBUMIN SERPL BCG-MCNC: 2.7 G/DL (ref 3.5–5.2)
ALP SERPL-CCNC: 86 U/L (ref 40–150)
ALT SERPL W P-5'-P-CCNC: <5 U/L (ref 0–70)
ANION GAP SERPL CALCULATED.3IONS-SCNC: 13 MMOL/L (ref 7–15)
APTT PPP: 33 SECONDS (ref 22–38)
AST SERPL W P-5'-P-CCNC: 19 U/L (ref 0–45)
BILIRUB DIRECT SERPL-MCNC: <0.2 MG/DL (ref 0–0.3)
BILIRUB SERPL-MCNC: 0.5 MG/DL
BUN SERPL-MCNC: 54.7 MG/DL (ref 8–23)
CALCIUM SERPL-MCNC: 8.6 MG/DL (ref 8.8–10.4)
CHLORIDE SERPL-SCNC: 97 MMOL/L (ref 98–107)
CREAT SERPL-MCNC: 1.5 MG/DL (ref 0.67–1.17)
EGFRCR SERPLBLD CKD-EPI 2021: 48 ML/MIN/1.73M2
ERYTHROCYTE [DISTWIDTH] IN BLOOD BY AUTOMATED COUNT: 19.4 % (ref 10–15)
FIBRINOGEN PPP-MCNC: 734 MG/DL (ref 170–510)
GLUCOSE BLDC GLUCOMTR-MCNC: 202 MG/DL (ref 70–99)
GLUCOSE BLDC GLUCOMTR-MCNC: 211 MG/DL (ref 70–99)
GLUCOSE BLDC GLUCOMTR-MCNC: 212 MG/DL (ref 70–99)
GLUCOSE BLDC GLUCOMTR-MCNC: 259 MG/DL (ref 70–99)
GLUCOSE SERPL-MCNC: 223 MG/DL (ref 70–99)
HCO3 SERPL-SCNC: 20 MMOL/L (ref 22–29)
HCT VFR BLD AUTO: 28.9 % (ref 40–53)
HGB BLD-MCNC: 8.9 G/DL (ref 13.3–17.7)
INR PPP: 1.26 (ref 0.85–1.15)
MAGNESIUM SERPL-MCNC: 2.9 MG/DL (ref 1.7–2.3)
MCH RBC QN AUTO: 29.6 PG (ref 26.5–33)
MCHC RBC AUTO-ENTMCNC: 30.8 G/DL (ref 31.5–36.5)
MCV RBC AUTO: 96 FL (ref 78–100)
PHOSPHATE SERPL-MCNC: 2.9 MG/DL (ref 2.5–4.5)
PLATELET # BLD AUTO: 139 10E3/UL (ref 150–450)
POTASSIUM SERPL-SCNC: 5 MMOL/L (ref 3.4–5.3)
PROT SERPL-MCNC: 5.4 G/DL (ref 6.4–8.3)
RBC # BLD AUTO: 3.01 10E6/UL (ref 4.4–5.9)
SODIUM SERPL-SCNC: 130 MMOL/L (ref 135–145)
WBC # BLD AUTO: 7 10E3/UL (ref 4–11)

## 2024-08-03 PROCEDURE — 83735 ASSAY OF MAGNESIUM: CPT | Performed by: PHYSICIAN ASSISTANT

## 2024-08-03 PROCEDURE — 250N000013 HC RX MED GY IP 250 OP 250 PS 637: Performed by: SURGERY

## 2024-08-03 PROCEDURE — 36415 COLL VENOUS BLD VENIPUNCTURE: CPT | Performed by: PHYSICIAN ASSISTANT

## 2024-08-03 PROCEDURE — 99418 PROLNG IP/OBS E/M EA 15 MIN: CPT | Performed by: NURSE PRACTITIONER

## 2024-08-03 PROCEDURE — 999N000157 HC STATISTIC RCP TIME EA 10 MIN

## 2024-08-03 PROCEDURE — 85027 COMPLETE CBC AUTOMATED: CPT | Performed by: PHYSICIAN ASSISTANT

## 2024-08-03 PROCEDURE — 250N000013 HC RX MED GY IP 250 OP 250 PS 637: Performed by: NURSE PRACTITIONER

## 2024-08-03 PROCEDURE — 250N000011 HC RX IP 250 OP 636

## 2024-08-03 PROCEDURE — 250N000011 HC RX IP 250 OP 636: Performed by: SURGERY

## 2024-08-03 PROCEDURE — 80053 COMPREHEN METABOLIC PANEL: CPT | Performed by: PHYSICIAN ASSISTANT

## 2024-08-03 PROCEDURE — 84100 ASSAY OF PHOSPHORUS: CPT | Performed by: PHYSICIAN ASSISTANT

## 2024-08-03 PROCEDURE — 94660 CPAP INITIATION&MGMT: CPT

## 2024-08-03 PROCEDURE — 85610 PROTHROMBIN TIME: CPT | Performed by: PHYSICIAN ASSISTANT

## 2024-08-03 PROCEDURE — 99233 SBSQ HOSP IP/OBS HIGH 50: CPT | Mod: 24 | Performed by: NURSE PRACTITIONER

## 2024-08-03 PROCEDURE — 85384 FIBRINOGEN ACTIVITY: CPT | Performed by: PHYSICIAN ASSISTANT

## 2024-08-03 PROCEDURE — 71045 X-RAY EXAM CHEST 1 VIEW: CPT | Mod: 26 | Performed by: RADIOLOGY

## 2024-08-03 PROCEDURE — 85730 THROMBOPLASTIN TIME PARTIAL: CPT | Performed by: PHYSICIAN ASSISTANT

## 2024-08-03 PROCEDURE — 258N000003 HC RX IP 258 OP 636

## 2024-08-03 PROCEDURE — 250N000013 HC RX MED GY IP 250 OP 250 PS 637

## 2024-08-03 PROCEDURE — 93010 ELECTROCARDIOGRAM REPORT: CPT | Performed by: INTERNAL MEDICINE

## 2024-08-03 PROCEDURE — 71045 X-RAY EXAM CHEST 1 VIEW: CPT

## 2024-08-03 PROCEDURE — 82310 ASSAY OF CALCIUM: CPT | Performed by: PHYSICIAN ASSISTANT

## 2024-08-03 PROCEDURE — 200N000002 HC R&B ICU UMMC

## 2024-08-03 PROCEDURE — 250N000013 HC RX MED GY IP 250 OP 250 PS 637: Performed by: PHYSICIAN ASSISTANT

## 2024-08-03 PROCEDURE — 93005 ELECTROCARDIOGRAM TRACING: CPT

## 2024-08-03 RX ORDER — DILTIAZEM HYDROCHLORIDE 120 MG/1
120 CAPSULE, COATED, EXTENDED RELEASE ORAL DAILY
Status: DISCONTINUED | OUTPATIENT
Start: 2024-08-03 | End: 2024-08-04

## 2024-08-03 RX ORDER — OXYBUTYNIN CHLORIDE 10 MG/1
10 TABLET, EXTENDED RELEASE ORAL EVERY MORNING
Status: DISCONTINUED | OUTPATIENT
Start: 2024-08-03 | End: 2024-08-10 | Stop reason: HOSPADM

## 2024-08-03 RX ORDER — FUROSEMIDE 10 MG/ML
60 INJECTION INTRAMUSCULAR; INTRAVENOUS ONCE
Status: COMPLETED | OUTPATIENT
Start: 2024-08-03 | End: 2024-08-03

## 2024-08-03 RX ORDER — TRAZODONE HYDROCHLORIDE 50 MG/1
50 TABLET, FILM COATED ORAL
Status: DISCONTINUED | OUTPATIENT
Start: 2024-08-03 | End: 2024-08-10 | Stop reason: HOSPADM

## 2024-08-03 RX ADMIN — Medication 5 MG: at 21:58

## 2024-08-03 RX ADMIN — FLUTICASONE PROPIONATE 1 SPRAY: 50 SPRAY, METERED NASAL at 20:51

## 2024-08-03 RX ADMIN — ASPIRIN 81 MG CHEWABLE TABLET 162 MG: 81 TABLET CHEWABLE at 08:36

## 2024-08-03 RX ADMIN — FUROSEMIDE 60 MG: 10 INJECTION, SOLUTION INTRAMUSCULAR; INTRAVENOUS at 11:06

## 2024-08-03 RX ADMIN — HEPARIN SODIUM 5000 UNITS: 5000 INJECTION, SOLUTION INTRAVENOUS; SUBCUTANEOUS at 13:09

## 2024-08-03 RX ADMIN — HEPARIN SODIUM 5000 UNITS: 5000 INJECTION, SOLUTION INTRAVENOUS; SUBCUTANEOUS at 20:52

## 2024-08-03 RX ADMIN — Medication 25 MG: at 21:58

## 2024-08-03 RX ADMIN — DILTIAZEM HYDROCHLORIDE 120 MG: 120 CAPSULE, EXTENDED RELEASE ORAL at 11:06

## 2024-08-03 RX ADMIN — ROSUVASTATIN CALCIUM 20 MG: 20 TABLET, FILM COATED ORAL at 08:36

## 2024-08-03 RX ADMIN — INSULIN ASPART 5 UNITS: 100 INJECTION, SOLUTION INTRAVENOUS; SUBCUTANEOUS at 17:48

## 2024-08-03 RX ADMIN — HEPARIN SODIUM 5000 UNITS: 5000 INJECTION, SOLUTION INTRAVENOUS; SUBCUTANEOUS at 05:18

## 2024-08-03 RX ADMIN — INSULIN ASPART 3 UNITS: 100 INJECTION, SOLUTION INTRAVENOUS; SUBCUTANEOUS at 09:05

## 2024-08-03 RX ADMIN — AMIODARONE HYDROCHLORIDE 150 MG: 1.5 INJECTION, SOLUTION INTRAVENOUS at 05:11

## 2024-08-03 RX ADMIN — TAMSULOSIN HYDROCHLORIDE 0.4 MG: 0.4 CAPSULE ORAL at 08:36

## 2024-08-03 RX ADMIN — SODIUM CHLORIDE 1 MG/MIN: 9 INJECTION, SOLUTION INTRAVENOUS at 05:04

## 2024-08-03 RX ADMIN — OXYBUTYNIN CHLORIDE 10 MG: 10 TABLET, EXTENDED RELEASE ORAL at 11:06

## 2024-08-03 RX ADMIN — AMIODARONE HYDROCHLORIDE 150 MG: 1.5 INJECTION, SOLUTION INTRAVENOUS at 08:42

## 2024-08-03 RX ADMIN — SALINE NASAL SPRAY 1 SPRAY: 1.5 SOLUTION NASAL at 23:43

## 2024-08-03 RX ADMIN — INSULIN ASPART 3 UNITS: 100 INJECTION, SOLUTION INTRAVENOUS; SUBCUTANEOUS at 13:12

## 2024-08-03 ASSESSMENT — ACTIVITIES OF DAILY LIVING (ADL)
ADLS_ACUITY_SCORE: 41
ADLS_ACUITY_SCORE: 41
ADLS_ACUITY_SCORE: 47
ADLS_ACUITY_SCORE: 43
ADLS_ACUITY_SCORE: 43
ADLS_ACUITY_SCORE: 42
ADLS_ACUITY_SCORE: 41
ADLS_ACUITY_SCORE: 43
ADLS_ACUITY_SCORE: 47
ADLS_ACUITY_SCORE: 42
ADLS_ACUITY_SCORE: 42
ADLS_ACUITY_SCORE: 43
ADLS_ACUITY_SCORE: 42
ADLS_ACUITY_SCORE: 47
ADLS_ACUITY_SCORE: 47
ADLS_ACUITY_SCORE: 41
ADLS_ACUITY_SCORE: 41
ADLS_ACUITY_SCORE: 44
ADLS_ACUITY_SCORE: 41

## 2024-08-03 NOTE — PROGRESS NOTES
Critical Care Attending Progress Note  I, Jeimy Mackenzie MD, PhD saw this patient with the POLO and agree with the findings and plan of care as documented in the note. Please see separate note from today for further documentation.     I personally reviewed vital signs, medications, labs and imaging.     Assessment:   Patient is is s/p coronary angioplasty w/ stent placement of the circumflex coronary artery in 2002. He endorses significant GALLARDO. A cath on 5/22/24 demonstrated 70% occulsion of the proximal LAD, 60% distal LAD, 30% proximal LCx, 30% distal LCx, 90 % pRCA. EF 60%. PMH: HERRERA on CPAP, morbid obesity, frailty, SP R TKA, sp radiation to prostate with radiation injury to rectum (clots every couple of days rectally). Per wife Hx of AF (Diltiazam, Eliquis prior).    7/31: remains on low NE  8/1: No active issues overnight. This am orthostatic when getting out of bed with NE temporarily back on. Junctional rhythm in the 60s. Restart Tamsulosin. Add Lantus and increase sliding scale insulin.   8/2: Overnight hypotensive events when turning. Had BM in am. During the day did well. Got up out of the bed without hypotension. Using IS. Will deline.    Did well overnight. Per wife is getting up with one person now and not three. Now in AF - previously junctional- which he had for past 10 years. Did not sleep well. Todays plan: diurese, add melatonin/trazodone for sleep aids, increase lantus.     Active problems and current treatments include:     Acute postop pain: controlled, multimodal analgesia  Respiratory insufficiency: extubated, IS, OOB, CPAP at night  Cardiogenic and vasogenic shock: resolved  AF- went into AF this am and received amio bolus x 2. Per wife he has had AF for past 10 years treated with diltiazam and was on eliquis until his treatment for proctitis and prostatitis started.   ULISES: improving- sCr 1.5 today  Acute blood loss anemia: received 1 unit(s) RBC 8/1  ID: periop abx  Nutrition: diet  DM 2,  hyperglycemia: increase lantus to 20 BID, SSI  Lines: CTs, v-wires  PPX: SCDs, subcutaneous heparin  DISPO: CVICU  Family: The wife participated in rounds, all questions answered.        I personally managed the hemodynamics, analgesia, metabolic abnormalities and nutritional status.    I agree with the assessment and plan. I spent 46 minutes exclusive of procedures evaluating and managing this patient, discussing with the consultants, and updating the patient and family.     Jeimy Mackenzie MD, PhD

## 2024-08-03 NOTE — PLAN OF CARE
Shift Events: A&Ox4, Ax1-2 to chair/commode, denies pain, afebrile. Remains in a.fib 90s - 110s despite additional amio bolus and transition off gtt to oral diltiazem, SYS < 140 & Map > 65 without intervention. Transitioned to RA, utilizing IS, CPAP overnight, LS clear. Occasional urinary incontinence and urgency - lasix 60mg given. Appetite improving, lantus increased to 20 units BID. Cts with output slowing.     Plan: Continue to increase activity as tolerated.    For complete assessments and vital signs, please refer to flowsheets.       Goal Outcome Evaluation:    Plan of Care Reviewed With: patient    Overall Patient Progress: improving    Outcome Evaluation: Tolerated more and more acitivity, transitioned to RA.

## 2024-08-03 NOTE — PLAN OF CARE
Neuro: intact, denies pain    CV:   Went into afib 120s-150s at 0500, hemodynamically stable. Started on amio gtt.  Pacer settings: % V-paced at 70 BPM.   (Intrinsic: junctional 50s)  Sys goal <140, MAP > 65  No hypotension with activity    Pulm:   5 LPM NC while awake. AVAPS overnight (pt has home cpap but prefers avaps). Clear LS. Endorses mild SOB with activity. Encourage IS use & activity. Flonase for congested sinuses    GI: diabetic/cardiac diet, appetite improving. + flatus, multiple soft Bms overnight with maroon blood clots    : Voiding spontaneously, urgency incontinence    Skin: LLE graft site, sternal incision, CT sites     Drains: CT x 3 (2 meds, 1 pleural) all to 1 atrium     Drips:   L PIV running amio at 1mg

## 2024-08-03 NOTE — PROGRESS NOTES
CV ICU PROGRESS NOTE    Jorje Hutchinson  [id number]  Admitted: 7/30/2024  5:30 AM      ASSESSMENT: Jorje Hutchinson is a 74 year old male with PMH of CAD sp coronary angioplasty w/stent placement of circumflex coronary artery in 2002, HTN, HLD, SVT, chronic afib (on dilat & apixaban), HERRERA on home CPAP, CKD, T2DM, OA, prostate cancer, and worsening morbid obesity. He presents to [facility] for CORONARY ARTERY BYPASS GRAFT x2 (LIMA-LAD, SVG-LPL), Pulmonary vein isolation with RF ablation, LIGATION, LEFT ATRIAL APPENDAGE, sternal closure with plates and wires.     OVERNIGHT EVENTS:  - afib overnight, given amiodarone gtt and infusion. AVSS, patient endorses mild lightheadedness.  - patient c/o CPAP not fiting well despite his home mask  - patient c/o nasal congestion  - BG still elevated but trending down slightly     TODAY'S PROGRESS:  - Amiodarone bolused early this AM again  - Later, discontinue amiodarone infusion  - restart PTA diltiazem & PTA PRN  - Melatonin scheduled  - Trazodone PRN  - Lasix 60 mg IV x 1  - goal -1 to -2 L goal fluid status  - restart PTA oxybutynin  - increase glargine to 20 units BID (give additional 5 units now)    CO-MORBIDITIES:  Patient Active Problem List   Diagnosis    Diabetes mellitus, type 2 (H)    Coronary artery disease involving native coronary artery of native heart, unspecified whether angina present     PLAN:  Neuro/ pain/ sedation:  #Acute Postoperative pain, controlled  - Monitor neurological status. Notify the MD for any acute changes in exam.  - Pain: Tylenol, robaxin PRN    # Insomnia  - Melatonin 5 mg HS  - Trazodone PRN     Pulmonary care:   #Obstructive Sleep Apnea, CPAP  #Postoperative ventilation management, resolved  - Extubated on 7/30, tolerated well  - Titrate supplemental oxygen to maintain saturation above 92%.  - Pulmonary hygiene: Incentive spirometer every 15-30 minutes when awake, flutter valve, C&DB  - Home CPAP at night--intermittently using home vs AVAPS  "for best results   - CXR 8/2 & 8/3  with increased opacities right lung base, patient encouraged to increase IS use, OOB to chair, Cough and deep breathe     Cardiovascular:  #S/p CABG x2 (LIMA-LAD, SVG-LPL), Pulmonary vein isolation with RF ablation, LIGATION, LEFT ATRIAL APPENDAGE, sternal closure with plates and wires on 7/30/24 by Dr. Wilson  #History of CAD sp coronary angioplasty w/stent of circumflex coronary a in 2002  #HTN  #HLD  #Supraventricular tachycardia  #Post-op vasoplegia  #Hx of a fib (PTA diltiazem, Apixaban)   Recent echo on 6/20/24 with LVEF of 60-65%  - Monitor hemodynamic status. Currently no pressors   - Goal MAP>65, SBP<130  - Restarted PTA rosuvastatin 20, asa 162 mg (PTA 81 mg)  - Decrease pacing as tolerated, EP consult for intrinsic recovery  - restart PTA  diltiazem ER 120mg every day, diltiazem 30 mg prn (patient took at home when he felt RVR \"kick in\")   - PTA meds on hold: clopidogrel 75 mg am, losartan 25 mg at bedtime, sotalol 80 mg qd, spironolactone 25 mg every day, torsemide 20 mg, Apixaban 5 mg BID       GI care/ Nutrition:  #Concern for protein-calorie malnutrition  # Constipation - resolved   - Diet: Combo of moderate consistent CHO diet and low saturated fat sodium less than 2400 mg  - Discontinued protonix as not PTA med  - Continue bowel regimen: miralax, senna, PRN milk of mag    Renal/ Fluid Balance/ Electrolytes:   #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 HELD  - Monitoring hyponatremia with serial BMP  - Received Lasix 60 mg IV yesterday with " good response (-1.9L). Repeat today as patient still + 3.5 L up since admission     Endocrine:  #Stress-induced hyperglycemia  #T2DM, insulin dependent  Preop A1c 6.7% on 7/1/24  - Insulin gtt discontinued  - Goal BG <180 for optimal healing  - PTA meds: Empagliflozin 10 mg every day, insulin glargine 38u subcutaneous AM to be held  - glargine 15 units BID --> increase to 20 units BID today (give 5 additional doses now)   - sliding scale insulin - 15 units utilized yesterday     ID/ Antibiotics:  #Stress induced leukocytosis, improved   - Completed perioperative regimen  - Continue to monitor fever curve, WBC and inflammatory markers as appropriate    Heme:  #Stress induced leukocytosis  #Acute blood loss anemia, improving  #Acute blood loss thrombocytopenia  No s/sx active bleeding  - Continue to monitor  - Daily CBC, Coag panel  - Heparin subQ 5000 TID  for PPX   - Transfused 1 U PRBCs on 8/1/2024 with noted improvement in Hgb on 8/2/2024  - Goal Hgb > 7     MSK/ Skin:  #Sternotomy  #Surgical Incision  #Chronic left knee pain  - Sternal precautions  - Postoperative incision management per protocol  - PT/OT/CR - SW/RNCC following for transition of care  - PTA allopurinol 100 mg bid held    Prophylaxis:  - Mechanical prophylaxis for DVT  - Chemical DVT prophylaxis - start subcutaneous heparin today POD1    Lines/ tubes/ drains:  - PIV  - CTs x3 (2 mediastinal, 1 R pleural)     Disposition:  - CVICU    ICU Checklist:  F - Feeding: has moderate carb, low saturated fat diet in place  A - Analgesia: controlled on  PRN tylenol, robaxin   S - Sedation: discontinued, N/A   T - Thromboembolic prophylaxis: SQH     H - Head of bed elevated - yes & OOB to chair   /U - Ulcer prophylaxis: no longer necessary, protonix discontinued 7/31  G - Glycemic control: increasing insulin regimen to address hyperglycemia  S - SBT: N/A     B - Bowel regimen: miralax and senna in place  I - Indwelling catheter: N/A   D - De-escalation of  antibiotics: completed, off antibiotics now      Patient seen, findings and plan discussed with CVICU staff, Dr Mackenzie.  Critical care time exclusive of procedures: 42 min       LESLIE Smith CNP     ====================================  SUBJECTIVE  In recliner, reports nasal congestion, not sleeping well d/t interruptions and HERRERA mis-fitting.    Patient noting A fib overnight, amiodarone bolus and infusion started.     OBJECTIVE  1. VITAL SIGNS  Temp:  [98.2  F (36.8  C)-98.6  F (37  C)] 98.4  F (36.9  C)  Pulse:  [] 101  Resp:  [18-29] 22  BP: (101-150)/(53-88) 117/59  MAP:  [70 mmHg-89 mmHg] 81 mmHg  Arterial Line BP: (126-146)/(51-66) 144/60  FiO2 (%):  [35 %] 35 %  SpO2:  [93 %-99 %] 98 %  FiO2 (%): 35 %  Resp: 22      2. INTAKE/ OUTPUT  I/O last 3 completed shifts:  In: 1049 [P.O.:980; I.V.:69]  Out: 2975 [Urine:2465; Chest Tube:510]    3. PHYSICAL EXAMINATION  GEN:  no acute distress, pleasant gentleman, sitting in chair   NEURO:  no focal deficits, moving upper and lower extremities spontaneously, alert, oriented,   CV:  heart rate irregular, rate about 90-100s, no edema, paced on VVI at 70  PULM:  non-labored breathing, no stridor or wheezes, lungs CTA bilaterally, NC weaned to RA, nasal congestion   MSK:  extremities warm and well perfused  SKIN:  no rash, sternotomy site c/d/I  CT:  to suction, serosang output, no airleak or crepitus, tubes dry    4. INVESTIGATIONS  Arterial Blood Gases   Recent Labs   Lab 07/31/24  0259 07/30/24  2308 07/30/24  2143 07/30/24  1955   PH 7.34* 7.34* 7.29* 7.34*   PCO2 44 44 51* 45   PO2 129* 140* 133* 145*   HCO3 24 24 25 24     Complete Blood Count   Recent Labs   Lab 08/03/24  0543 08/02/24  0406 08/01/24  0537 08/01/24  0444   WBC 7.0 8.5 9.2 9.0   HGB 8.9* 8.7* 7.9* 7.9*   * 100* 98* 100*     Basic Metabolic Panel  Recent Labs   Lab 08/02/24  2221 08/02/24  1812 08/02/24  1310 08/02/24  1309 08/02/24  0409 08/02/24  0406 08/01/24  0858  "08/01/24 0444 07/31/24  0952 07/31/24  0948   NA  --   --  128*  --   --  127*  --  130*  --  133*   POTASSIUM  --   --  4.8  --   --  5.1  --  5.1  --  4.7   CHLORIDE  --   --  98  --   --  96*  --  99  --  101   CO2  --   --  23  --   --  22  --  21*  --  20*   BUN  --   --  49.6*  --   --  51.7*  --  40.9*  --  32.8*   CR  --   --  1.62*  --   --  1.85*  --  1.86*  --  1.58*   * 257* 253* 234*   < > 264*   < > 220*   < > 153*    < > = values in this interval not displayed.     Liver Function Tests  Recent Labs   Lab 08/02/24 0406 08/01/24 0444 07/31/24 0948 07/31/24  0258   AST 22 36 51* 53*   ALT <5 <5 8 9   ALKPHOS 92 80 82 73   BILITOTAL 0.5 0.4 0.4 0.4   ALBUMIN 3.0* 3.0* 3.3* 3.4*   INR 1.34* 1.42* 1.32* 1.27*     Pancreatic Enzymes  No lab results found in last 7 days.  Coagulation Profile  Recent Labs   Lab 08/02/24 0406 08/01/24 0444 07/31/24 0948 07/31/24  0258   INR 1.34* 1.42* 1.32* 1.27*   PTT 35 34 31 28     Lactate  Invalid input(s): \"LACTATE\"    5. RADIOLOGY  Recent Results (from the past 24 hour(s))   RAPHAEL with Report    Narrative    Tyrone Barrientos DO     7/30/2024  3:17 PM  Perioperative RAPHAEL Procedure Note    Staff -        Resident/Fellow: Tyrone Barrientos DO       Performed By: fellow  Preanesthesia Checklist:  Patient identified, IV assessed, risks and   benefits discussed, monitors and equipment assessed, procedure being   performed at surgeon's request and anesthesia consent obtained.    RAPHAEL Probe Insertion    Probe Status PRE Insertion: NO obvious damage  Probe type:  Adult 3D  Bite block used:   Oral Airway  Insertion Technique: Jaw Lift  Insertion complications: None obvious  Billing Report:RAPHAEL report by Anesthesiologist (See Separate Report note)  Probe Status POST Removal: NO obvious damage    RAPHAEL Report  General Procedure Information  Images for this study have been archived.  Modalities: Color flow mapping, PW Doppler, CW Doppler, 3D and 2D  Diagnostic indications for " RAPHAEL:   Cardiomyopathy, other  Echocardiographic and Doppler Measurements  Right Ventricle:  Cavity size normal.    Hypertrophy not present.     Thrombus not present.    Global function normal.     Left Ventricle:  Cavity size normal.    Hypertrophy not present.     Thrombus not present.   Global Function normal.       Ventricular Regional Function:  1- Basal Anteroseptal:  normal  2- Basal Anterior:  normal  3- Basal Anterolateral:  normal  4- Basal Inferolateral:  normal  5- Basal Inferior:  normal  6- Basal Inferoseptal:  normal  7- Mid Anteroseptal:  normal  8- Mid Anterior:  normal  9- Mid Anterolateral:  normal  10- Mid Inferolateral:  normal  11- Mid Inferior:  normal  12- Mid Inferoseptal:  normal  13- Apical Anterior:  normal  14- Apical Lateral:  normal  15- Apical Inferior:  normal  16- Apical Septal:  normal  17- [place]:  normal    Valves  Aortic Valve: Annulus normal.  Stenosis not present.  Regurgitation   absent.  Leaflets normal.  Leaflet motions normal.    Mitral Valve: Annulus normal.  Stenosis not present.  Regurgitation   absent.  Leaflets normal.  Leaflet motions normal.    Tricuspid Valve: Annulus normal.  Stenosis not present.  Regurgitation   absent.  Leaflets normal.  Leaflet motions normal.        Aorta: Ascending Aorta: Size normal.  Dissection not present.  Plaque   thickness less than 3 mm.  Mobile plaque not present.    Aortic Arch: Size normal.    Dissection not present.   Plaque thickness   less than 3 mm.   Mobile plaque not present.    Descending Aorta: Size normal.    Dissection not present.   Plaque   thickness less than 3 mm.   Mobile plaque not present.      Right Atrium:  Size normal.   Spontaneous echo contrast not present.     Thrombus not present.   Tumor not present.   Device not present.     Left Atrium: Size normal.  Spontaneous echo contrast not present.    Thrombus not present.  Tumor not present.  Device not present.    Left atrial appendage normal.     Atrial Septum:  Intra-atrial septal morphology normal.       Ventricular Septum: Intra-ventricular septum morphology normal.        Other Findings:   Pericardium:  normal. Pleural Effusion:  none. Pulmonary Arteries:    normal. Pulmonary Venous Flow:  normal. Cornoary sinus catheter present.    Post Intervention Findings  Procedure(s) performed:  CABG. Global function:  Unchanged. Regional wall   motion: Unchanged. Surgeon(s) notified of all postintervention findings:   Yes.                 Echocardiogram Comments  Echocardiogram comments:   Pre-CPB:  Grossly normal LV size with preserved systolic function and EF 62% by   biplane assessment. Normal LV diastolic function by E/A 1.4 and lateral e'   10.8 cm/s. Grossly normal RV size with no evidence of systolic dysfunction   by TAPSE 25 mm. STACY velocities > 4 cm/s with no visualization of SEC or   thrombus via 3D assessment. No PFO by CFD. Trace MR. Trileaflet aortic   valve without significant regurgitation or stenosis. No pleural or   pericardial effusion. No echo evidence of aortic dissection. Findings   communicated with surgical team in real time.    Post-CPB:  Globally unchanged biventricular size and function. No new RWMA. MR   trace-mild by visual appearance and 2D PISA EROA 0.06 cm^2. No significant   pleural effusion. No echo evidence of aortic dissection. Findings   communicated with surgical team in real time..   XR Chest Port 1 View    Narrative    Portable chest    INDICATION: Endotracheal tube positioning    COMPARISON: CT chest 7/1/2024    FINDINGS: Low inspiratory volumes. Left basilar thoracostomy tube. No  pneumothorax. Left atrial appendage ligation clip. Sternal fixation.  Endotracheal tube in the mid thoracic trachea. Mediastinal drain  tubing. Right IJ sheath tip in the low right internal jugular vein.  Bandlike densities in the left lung suggest mild edema or subsegmental  atelectasis.    Impression    IMPRESSION: Endotracheal tube presumably in the mid  thoracic trachea.  Mild edema/atelectasis especially in the left lung.    ARMIDA GONZALEZ MD         SYSTEM ID:  [id number]   XR Chest Port 1 View    Impression    RESIDENT PRELIMINARY INTERPRETATION  Impression:   1. Interval removal of endotracheal tube. Otherwise stable support  devices.  2. Continued left greater than right perihilar predominant opacities,  likely mild pulmonary edema and atelectasis.

## 2024-08-04 ENCOUNTER — APPOINTMENT (OUTPATIENT)
Dept: PHYSICAL THERAPY | Facility: CLINIC | Age: 74
DRG: 233 | End: 2024-08-04
Attending: SURGERY
Payer: MEDICARE

## 2024-08-04 ENCOUNTER — APPOINTMENT (OUTPATIENT)
Dept: GENERAL RADIOLOGY | Facility: CLINIC | Age: 74
DRG: 233 | End: 2024-08-04
Attending: SURGERY
Payer: MEDICARE

## 2024-08-04 LAB
ALBUMIN SERPL BCG-MCNC: 3 G/DL (ref 3.5–5.2)
ALP SERPL-CCNC: 82 U/L (ref 40–150)
ALT SERPL W P-5'-P-CCNC: <5 U/L (ref 0–70)
ANION GAP SERPL CALCULATED.3IONS-SCNC: 9 MMOL/L (ref 7–15)
APTT PPP: 29 SECONDS (ref 22–38)
AST SERPL W P-5'-P-CCNC: 16 U/L (ref 0–45)
BILIRUB DIRECT SERPL-MCNC: 0.2 MG/DL (ref 0–0.3)
BILIRUB SERPL-MCNC: 0.5 MG/DL
BUN SERPL-MCNC: 47.6 MG/DL (ref 8–23)
CALCIUM SERPL-MCNC: 8.4 MG/DL (ref 8.8–10.4)
CHLORIDE SERPL-SCNC: 99 MMOL/L (ref 98–107)
CREAT SERPL-MCNC: 1.31 MG/DL (ref 0.67–1.17)
EGFRCR SERPLBLD CKD-EPI 2021: 57 ML/MIN/1.73M2
ERYTHROCYTE [DISTWIDTH] IN BLOOD BY AUTOMATED COUNT: 19.7 % (ref 10–15)
FIBRINOGEN PPP-MCNC: 731 MG/DL (ref 170–510)
GLUCOSE BLDC GLUCOMTR-MCNC: 148 MG/DL (ref 70–99)
GLUCOSE BLDC GLUCOMTR-MCNC: 168 MG/DL (ref 70–99)
GLUCOSE BLDC GLUCOMTR-MCNC: 213 MG/DL (ref 70–99)
GLUCOSE BLDC GLUCOMTR-MCNC: 262 MG/DL (ref 70–99)
GLUCOSE SERPL-MCNC: 158 MG/DL (ref 70–99)
HCO3 SERPL-SCNC: 24 MMOL/L (ref 22–29)
HCT VFR BLD AUTO: 28.2 % (ref 40–53)
HGB BLD-MCNC: 8.9 G/DL (ref 13.3–17.7)
INR PPP: 1.14 (ref 0.85–1.15)
MAGNESIUM SERPL-MCNC: 2.7 MG/DL (ref 1.7–2.3)
MCH RBC QN AUTO: 30.1 PG (ref 26.5–33)
MCHC RBC AUTO-ENTMCNC: 31.6 G/DL (ref 31.5–36.5)
MCV RBC AUTO: 95 FL (ref 78–100)
PHOSPHATE SERPL-MCNC: 2.5 MG/DL (ref 2.5–4.5)
PLATELET # BLD AUTO: 130 10E3/UL (ref 150–450)
POTASSIUM SERPL-SCNC: 4.1 MMOL/L (ref 3.4–5.3)
POTASSIUM SERPL-SCNC: 4.7 MMOL/L (ref 3.4–5.3)
PROT SERPL-MCNC: 5.3 G/DL (ref 6.4–8.3)
RBC # BLD AUTO: 2.96 10E6/UL (ref 4.4–5.9)
SODIUM SERPL-SCNC: 132 MMOL/L (ref 135–145)
WBC # BLD AUTO: 5.4 10E3/UL (ref 4–11)

## 2024-08-04 PROCEDURE — 120N000002 HC R&B MED SURG/OB UMMC

## 2024-08-04 PROCEDURE — 36415 COLL VENOUS BLD VENIPUNCTURE: CPT

## 2024-08-04 PROCEDURE — 84132 ASSAY OF SERUM POTASSIUM: CPT

## 2024-08-04 PROCEDURE — 250N000013 HC RX MED GY IP 250 OP 250 PS 637: Performed by: PHYSICIAN ASSISTANT

## 2024-08-04 PROCEDURE — 97530 THERAPEUTIC ACTIVITIES: CPT | Mod: GP | Performed by: PHYSICAL THERAPIST

## 2024-08-04 PROCEDURE — 71045 X-RAY EXAM CHEST 1 VIEW: CPT | Mod: 26 | Performed by: RADIOLOGY

## 2024-08-04 PROCEDURE — 250N000011 HC RX IP 250 OP 636: Performed by: SURGERY

## 2024-08-04 PROCEDURE — 85384 FIBRINOGEN ACTIVITY: CPT | Performed by: PHYSICIAN ASSISTANT

## 2024-08-04 PROCEDURE — 250N000011 HC RX IP 250 OP 636

## 2024-08-04 PROCEDURE — 84100 ASSAY OF PHOSPHORUS: CPT | Performed by: PHYSICIAN ASSISTANT

## 2024-08-04 PROCEDURE — 85027 COMPLETE CBC AUTOMATED: CPT | Performed by: PHYSICIAN ASSISTANT

## 2024-08-04 PROCEDURE — 85610 PROTHROMBIN TIME: CPT | Performed by: PHYSICIAN ASSISTANT

## 2024-08-04 PROCEDURE — 71045 X-RAY EXAM CHEST 1 VIEW: CPT

## 2024-08-04 PROCEDURE — 80053 COMPREHEN METABOLIC PANEL: CPT | Performed by: PHYSICIAN ASSISTANT

## 2024-08-04 PROCEDURE — 250N000013 HC RX MED GY IP 250 OP 250 PS 637: Performed by: SURGERY

## 2024-08-04 PROCEDURE — 36415 COLL VENOUS BLD VENIPUNCTURE: CPT | Performed by: PHYSICIAN ASSISTANT

## 2024-08-04 PROCEDURE — 250N000013 HC RX MED GY IP 250 OP 250 PS 637

## 2024-08-04 PROCEDURE — 83735 ASSAY OF MAGNESIUM: CPT | Performed by: PHYSICIAN ASSISTANT

## 2024-08-04 PROCEDURE — 97116 GAIT TRAINING THERAPY: CPT | Mod: GP | Performed by: PHYSICAL THERAPIST

## 2024-08-04 PROCEDURE — 85730 THROMBOPLASTIN TIME PARTIAL: CPT | Performed by: PHYSICIAN ASSISTANT

## 2024-08-04 PROCEDURE — 99233 SBSQ HOSP IP/OBS HIGH 50: CPT | Mod: 24 | Performed by: ANESTHESIOLOGY

## 2024-08-04 PROCEDURE — 82248 BILIRUBIN DIRECT: CPT | Performed by: PHYSICIAN ASSISTANT

## 2024-08-04 RX ORDER — DILTIAZEM HYDROCHLORIDE 180 MG/1
180 CAPSULE, COATED, EXTENDED RELEASE ORAL DAILY
Status: DISCONTINUED | OUTPATIENT
Start: 2024-08-05 | End: 2024-08-06

## 2024-08-04 RX ORDER — DILTIAZEM HYDROCHLORIDE 30 MG/1
30 TABLET, FILM COATED ORAL EVERY 8 HOURS SCHEDULED
Status: COMPLETED | OUTPATIENT
Start: 2024-08-04 | End: 2024-08-04

## 2024-08-04 RX ORDER — FUROSEMIDE 10 MG/ML
20 INJECTION INTRAMUSCULAR; INTRAVENOUS ONCE
Status: COMPLETED | OUTPATIENT
Start: 2024-08-04 | End: 2024-08-04

## 2024-08-04 RX ORDER — FUROSEMIDE 10 MG/ML
60 INJECTION INTRAMUSCULAR; INTRAVENOUS ONCE
Status: COMPLETED | OUTPATIENT
Start: 2024-08-04 | End: 2024-08-04

## 2024-08-04 RX ADMIN — DILTIAZEM HYDROCHLORIDE 30 MG: 30 TABLET, FILM COATED ORAL at 21:13

## 2024-08-04 RX ADMIN — OXYBUTYNIN CHLORIDE 10 MG: 10 TABLET, EXTENDED RELEASE ORAL at 08:30

## 2024-08-04 RX ADMIN — HEPARIN SODIUM 5000 UNITS: 5000 INJECTION, SOLUTION INTRAVENOUS; SUBCUTANEOUS at 04:01

## 2024-08-04 RX ADMIN — ASPIRIN 81 MG CHEWABLE TABLET 162 MG: 81 TABLET CHEWABLE at 08:30

## 2024-08-04 RX ADMIN — TAMSULOSIN HYDROCHLORIDE 0.4 MG: 0.4 CAPSULE ORAL at 08:30

## 2024-08-04 RX ADMIN — FUROSEMIDE 20 MG: 10 INJECTION, SOLUTION INTRAMUSCULAR; INTRAVENOUS at 18:36

## 2024-08-04 RX ADMIN — ROSUVASTATIN CALCIUM 20 MG: 20 TABLET, FILM COATED ORAL at 08:30

## 2024-08-04 RX ADMIN — Medication 25 MG: at 21:13

## 2024-08-04 RX ADMIN — INSULIN ASPART 1 UNITS: 100 INJECTION, SOLUTION INTRAVENOUS; SUBCUTANEOUS at 10:38

## 2024-08-04 RX ADMIN — SALINE NASAL SPRAY 1 SPRAY: 1.5 SOLUTION NASAL at 03:47

## 2024-08-04 RX ADMIN — DILTIAZEM HYDROCHLORIDE 120 MG: 120 CAPSULE, EXTENDED RELEASE ORAL at 08:31

## 2024-08-04 RX ADMIN — INSULIN ASPART 3 UNITS: 100 INJECTION, SOLUTION INTRAVENOUS; SUBCUTANEOUS at 18:32

## 2024-08-04 RX ADMIN — FUROSEMIDE 60 MG: 10 INJECTION, SOLUTION INTRAMUSCULAR; INTRAVENOUS at 10:38

## 2024-08-04 RX ADMIN — SALINE NASAL SPRAY 1 SPRAY: 1.5 SOLUTION NASAL at 21:35

## 2024-08-04 RX ADMIN — HEPARIN SODIUM 5000 UNITS: 5000 INJECTION, SOLUTION INTRAVENOUS; SUBCUTANEOUS at 15:03

## 2024-08-04 RX ADMIN — FLUTICASONE PROPIONATE 1 SPRAY: 50 SPRAY, METERED NASAL at 21:13

## 2024-08-04 RX ADMIN — HEPARIN SODIUM 5000 UNITS: 5000 INJECTION, SOLUTION INTRAVENOUS; SUBCUTANEOUS at 21:13

## 2024-08-04 RX ADMIN — Medication 5 MG: at 21:13

## 2024-08-04 RX ADMIN — DILTIAZEM HYDROCHLORIDE 30 MG: 30 TABLET, FILM COATED ORAL at 15:03

## 2024-08-04 ASSESSMENT — ACTIVITIES OF DAILY LIVING (ADL)
ADLS_ACUITY_SCORE: 42
ADLS_ACUITY_SCORE: 45
ADLS_ACUITY_SCORE: 46
ADLS_ACUITY_SCORE: 45
ADLS_ACUITY_SCORE: 42
ADLS_ACUITY_SCORE: 45
ADLS_ACUITY_SCORE: 42
ADLS_ACUITY_SCORE: 45
ADLS_ACUITY_SCORE: 46
ADLS_ACUITY_SCORE: 42
ADLS_ACUITY_SCORE: 46
ADLS_ACUITY_SCORE: 46
ADLS_ACUITY_SCORE: 45

## 2024-08-04 NOTE — PROGRESS NOTES
Major shift events:      Neuro: A/O x4 LAO, denies pain. Afebrile    CV:   Temp pacer settings: Wires capped. VVI at 70 BPM.     Afib rate . Amio did not convert. Started Dilt (home med) 8/3. Increased dose today for rate control.    Sys goal <140, MAP > 65  No hypotension with activity    Pulm:   RA while awake. AVAPS overnight (pt has home cpap but prefers avaps). Clear LS. Endorses mild SOB with activity. Encourage IS use & activity. Flonase and saline spray for congested sinuses    GI: diabetic/cardiac diet, appetite improving. + flatus, multiple soft Bms with maroon blood clots    : Voiding spontaneously, urgency incontinence. Needs assist to hold urinal    Skin: LLE graft site, sternal incision, CT sites     Drains/lines: CT x 3 (2 meds, 1 pleural) all to 1 atrium, PIV SL    Sindi Shell RN on 8/4/2024 at 5:57 PM

## 2024-08-04 NOTE — PROGRESS NOTES
Critical Care Attending Progress Note  I, Jeimy Mackenzie MD, PhD saw this patient with the resident and agree with the findings and plan of care as documented in the note. Please see separate note from today for further documentation.     I personally reviewed vital signs, medications, labs and imaging.     Assessment:   Patient is is s/p coronary angioplasty w/ stent placement of the circumflex coronary artery in 2002. He endorses significant GALLARDO. A cath on 5/22/24 demonstrated 70% occulsion of the proximal LAD, 60% distal LAD, 30% proximal LCx, 30% distal LCx, 90 % pRCA. EF 60%. PMH: HERRERA on CPAP, morbid obesity, frailty, SP R TKA, sp radiation to prostate with radiation injury to rectum (clots every couple of days rectally). Per wife Hx of AF (Diltiazam, Eliquis prior).    7/31: remains on low NE  8/1: No active issues overnight. This am orthostatic when getting out of bed with NE temporarily back on. Junctional rhythm in the 60s. Restart Tamsulosin. Add Lantus and increase sliding scale insulin.   8/2: Overnight hypotensive events when turning. Had BM in am. During the day did well. Got up out of the bed without hypotension. Using IS. Will deline.  8/3: Did well overnight. Per wife is getting up with one person now and not three. Now in AF - previously junctional- which he had for past 10 years. Did not sleep well. Todays plan: diurese, add melatonin/trazodone for sleep aids, increase lantus.     Delirium overnight w hallucinations. Improved now during the day. Was swalking the unitthis morning with PT.     Active problems and current treatments include:     Acute postop pain: controlled, multimodal analgesia  Respiratory insufficiency: extubated, IS, OOB, CPAP at night  Cardiogenic and vasogenic shock: resolved  AF- Per wife he has had PAF for past 10 years treated with diltiazam and sotalol and was on eliquis until his treatment for proctitis and prostatitis started.   ULISES: improving- sCr 1.3 today  Acute blood  loss anemia: received 1 unit(s) RBC 8/1  ID: periop abx  Nutrition: diet  DM 2, hyperglycemia: lantus 20 BID, SSI  Lines: CTs, v-wires  PPX: SCDs, subcutaneous heparin  DISPO: TTF  Family: The wife participated in rounds, all questions answered.        I personally managed the hemodynamics, analgesia, metabolic abnormalities and nutritional status.    I agree with the assessment and plan. I spent 42 minutes exclusive of procedures evaluating and managing this patient, discussing with the consultants, and updating the patient and family.     Jeimy Mackenzie MD, PhD

## 2024-08-04 NOTE — PLAN OF CARE
Neuro: LAO, following commands, afebrile. Bed alarm on overnight for impulsivity. Ax2 with walker for pivot transfers    CV:   Remained in afib overnight, 90s-110s  Temp pacer settings: % V-paced at 70 BPM.     Pulm:   RA while awake. Switching between AVAPS and home CPAP overnight. Using Flonase and PRN saline spray for nasal congestion.     GI/: diabetic/cardiac diet, appetite improving. LBM 8/3, blood-streaked stools. Voiding spontaneously, urgency incontinence    Skin: LLE graft site, sternal incision    Drains/lines: CT x 3 (2 meds, 1 pleural) all to 1 atrium, PIV SL

## 2024-08-04 NOTE — PROGRESS NOTES
CV ICU PROGRESS NOTE    Jorje Hutchinson  [id number]  Admitted: 7/30/2024  5:30 AM      ASSESSMENT: Jorje Hutchinson is a 74 year old male with PMH of CAD sp coronary angioplasty w/stent placement of circumflex coronary artery in 2002, HTN, HLD, SVT, chronic afib (on dilat & apixaban), HERRERA on home CPAP, CKD, T2DM, OA, prostate cancer, and worsening morbid obesity. He presents to [facility] for CORONARY ARTERY BYPASS GRAFT x2 (LIMA-LAD, SVG-LPL), Pulmonary vein isolation with RF ablation, LIGATION, LEFT ATRIAL APPENDAGE, sternal closure with plates and wires.     TODAY'S PROGRESS:  - Floor status  - EP consulted for atrial fibrillation  - Diltiazem increased to 180 QD  - Lasix 60 mg IV x 1  - goal -1 L fluid status    CO-MORBIDITIES:  Patient Active Problem List   Diagnosis    Diabetes mellitus, type 2 (H)    Coronary artery disease involving native coronary artery of native heart, unspecified whether angina present     PLAN:  Neuro/ pain/ sedation:  #Acute Postoperative pain, controlled  - Monitor neurological status. Notify the MD for any acute changes in exam.  - Pain: Tylenol, robaxin PRN    # Insomnia  - Melatonin 5 mg HS  - Trazodone PRN     Pulmonary care:   #Obstructive Sleep Apnea, CPAP  #Postoperative ventilation management, resolved  - Extubated on 7/30, tolerated well  - Titrate supplemental oxygen to maintain saturation above 92%.  - Pulmonary hygiene: Incentive spirometer every 15-30 minutes when awake, flutter valve, C&DB  - Home CPAP at night--intermittently using home vs AVAPS for best results   - Encouraged to increase IS use, OOB to chair, Cough and deep breathe     Cardiovascular:  #S/p CABG x2 (LIMA-LAD, SVG-LPL), Pulmonary vein isolation with RF ablation, LIGATION, LEFT ATRIAL APPENDAGE, sternal closure with plates and wires on 7/30/24 by Dr. Wilson  #History of CAD sp coronary angioplasty w/stent of circumflex coronary a in 2002  #HTN  #HLD  #Supraventricular tachycardia  #Post-op vasoplegia  #Hx  of a fib (PTA diltiazem, Apixaban)   Recent echo on 6/20/24 with LVEF of 60-65%  - Monitor hemodynamic status. 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  - Diet: Combo of moderate consistent CHO diet and low saturated fat sodium less than 2400 mg  - Discontinued protonix as not PTA med  - Continue bowel regimen: miralax, senna, PRN milk of mag    Renal/ Fluid Balance/ Electrolytes:   #CKD w/o dialysis  #Acute Kidney Injury  # Hyponatremia - improving  BL creat appears to be ~ 1.42  - Strict I/O, daily weights  - Avoid/limit nephrotoxins as able  - Replete lytes PRN per protocol  - PTA tamsulosin 0.4 mg on 8/1/2024  - restart  PTA Oxybutynin ER 10 mg every day  - Daily BMP, hepatic panel, phos, mag  - Monitoring hyponatremia with serial BMP  - I/O goal - 1L: Lasix 60 mg IV x1     Endocrine:  #Stress-induced hyperglycemia  #T2DM, insulin dependent  Preop A1c 6.7% on 7/1/24  - Insulin gtt discontinued  - Goal BG <180 for optimal healing  - PTA meds: Empagliflozin 10 mg every day, insulin glargine 38u subcutaneous AM to be held  - sliding scale insulin and glargine    ID/ Antibiotics:  #Stress induced leukocytosis, improved   - Completed perioperative regimen  - Continue to monitor fever curve, WBC and inflammatory markers as appropriate    Heme:  #Stress induced leukocytosis  #Acute blood loss anemia, improving  #Acute blood loss thrombocytopenia  No s/sx active bleeding  - Continue to monitor  - Daily CBC, Coag panel  - Heparin subQ 5000 TID  for PPX    - Transfused 1 U PRBCs on 8/1/2024 with noted improvement in Hgb on 8/2/2024  - Goal Hgb > 7     MSK/ Skin:  #Sternotomy  #Surgical Incision  #Chronic left knee pain  - Sternal precautions  - Postoperative incision management per protocol  - PT/OT/CR - SW/RNCC following for transition of care  - PTA allopurinol 100 mg bid held    Prophylaxis:  - Mechanical prophylaxis for DVT  - Chemical DVT prophylaxis - subcutaneous heparin    Lines/ tubes/ drains:  - PIV  - CTs     Disposition:  - Floor status    ICU Checklist:  F - Feeding: has moderate carb, low saturated fat diet in place  A - Analgesia: controlled   S - Sedation: discontinued, N/A   T - Thromboembolic prophylaxis: SQH     H - Head of bed elevated - yes & OOB to chair   /U - Ulcer prophylaxis: no longer necessary, protonix discontinued 7/31  G - Glycemic control: sliding scale insulin and glargine  S - SBT: N/A     B - Bowel regimen: miralax and senna in place  I - Indwelling catheter: N/A   D - De-escalation of antibiotics: completed, off antibiotics now      Patient seen, findings and plan discussed with CVICU staff, Dr Mackenzie.  Critical care time exclusive of procedures: 42 min     Seamus Salazar MD   Anesthesiology (PGY-3, CA-2)    ====================================  SUBJECTIVE  In recliner. Reports improved sleep overnight.      OBJECTIVE  1. VITAL SIGNS  Temp:  [97.6  F (36.4  C)-98.5  F (36.9  C)] 97.6  F (36.4  C)  Pulse:  [] 112  Resp:  [16-29] 16  BP: (100-144)/(53-76) 106/65  SpO2:  [92 %-100 %] 94 %  FiO2 (%): 35 %  Resp: 16      2. INTAKE/ OUTPUT  I/O last 3 completed shifts:  In: 1406.65 [P.O.:1140; I.V.:266.65]  Out: 809 [Urine:575; Chest Tube:234]    3. PHYSICAL EXAMINATION  GEN:  no acute distress, sitting in chair   NEURO:  no focal deficits, moving extremities spontaneously, alert, oriented,   CV:  heart rate irregular  PULM:  non-labored breathing, no stridor or wheezes, lungs CTA bilaterally, NC weaned to RA, nasal congestion  "  MSK:  extremities warm and well perfused  SKIN:  no rash, sternotomy site c/d/I  CT:  to suction, serosang output, no airleak or crepitus, tubes dry    4. INVESTIGATIONS  Arterial Blood Gases   Recent Labs   Lab 07/31/24  0259 07/30/24  2308 07/30/24  2143 07/30/24 1955   PH 7.34* 7.34* 7.29* 7.34*   PCO2 44 44 51* 45   PO2 129* 140* 133* 145*   HCO3 24 24 25 24     Complete Blood Count   Recent Labs   Lab 08/04/24  0451 08/03/24  0543 08/02/24  0406 08/01/24  0537   WBC 5.4 7.0 8.5 9.2   HGB 8.9* 8.9* 8.7* 7.9*   * 139* 100* 98*     Basic Metabolic Panel  Recent Labs   Lab 08/04/24  0840 08/04/24  0451 08/03/24 2050 08/03/24  1748 08/03/24  0904 08/03/24  0543 08/02/24  1812 08/02/24  1310 08/02/24  0409 08/02/24  0406   NA  --  132*  --   --   --  130*  --  128*  --  127*   POTASSIUM  --  4.1  --   --   --  5.0  --  4.8  --  5.1   CHLORIDE  --  99  --   --   --  97*  --  98  --  96*   CO2  --  24  --   --   --  20*  --  23  --  22   BUN  --  47.6*  --   --   --  54.7*  --  49.6*  --  51.7*   CR  --  1.31*  --   --   --  1.50*  --  1.62*  --  1.85*   * 158* 211* 259*   < > 223*   < > 253*   < > 264*    < > = values in this interval not displayed.     Liver Function Tests  Recent Labs   Lab 08/04/24 0451 08/03/24  0543 08/02/24  0406 08/01/24  0444   AST 16 19 22 36   ALT <5 <5 <5 <5   ALKPHOS 82 86 92 80   BILITOTAL 0.5 0.5 0.5 0.4   ALBUMIN 3.0* 2.7* 3.0* 3.0*   INR 1.14 1.26* 1.34* 1.42*     Pancreatic Enzymes  No lab results found in last 7 days.  Coagulation Profile  Recent Labs   Lab 08/04/24  0451 08/03/24  0543 08/02/24  0406 08/01/24  0444   INR 1.14 1.26* 1.34* 1.42*   PTT 29 33 35 34     Lactate  Invalid input(s): \"LACTATE\"    5. RADIOLOGY  Recent Results (from the past 24 hour(s))   RAPHAEL with Report    Narrative    Tyrone Barrientos DO     7/30/2024  3:17 PM  Perioperative RAPHAEL Procedure Note    Staff -        Resident/Fellow: Tyrone Barrientos DO       Performed By: fellow  Preanesthesia " Checklist:  Patient identified, IV assessed, risks and   benefits discussed, monitors and equipment assessed, procedure being   performed at surgeon's request and anesthesia consent obtained.    RAPHAEL Probe Insertion    Probe Status PRE Insertion: NO obvious damage  Probe type:  Adult 3D  Bite block used:   Oral Airway  Insertion Technique: Jaw Lift  Insertion complications: None obvious  Billing Report:RAPHAEL report by Anesthesiologist (See Separate Report note)  Probe Status POST Removal: NO obvious damage    RAPHAEL Report  General Procedure Information  Images for this study have been archived.  Modalities: Color flow mapping, PW Doppler, CW Doppler, 3D and 2D  Diagnostic indications for RAPHAEL:   Cardiomyopathy, other  Echocardiographic and Doppler Measurements  Right Ventricle:  Cavity size normal.    Hypertrophy not present.     Thrombus not present.    Global function normal.     Left Ventricle:  Cavity size normal.    Hypertrophy not present.     Thrombus not present.   Global Function normal.       Ventricular Regional Function:  1- Basal Anteroseptal:  normal  2- Basal Anterior:  normal  3- Basal Anterolateral:  normal  4- Basal Inferolateral:  normal  5- Basal Inferior:  normal  6- Basal Inferoseptal:  normal  7- Mid Anteroseptal:  normal  8- Mid Anterior:  normal  9- Mid Anterolateral:  normal  10- Mid Inferolateral:  normal  11- Mid Inferior:  normal  12- Mid Inferoseptal:  normal  13- Apical Anterior:  normal  14- Apical Lateral:  normal  15- Apical Inferior:  normal  16- Apical Septal:  normal  17- Gibson:  normal    Valves  Aortic Valve: Annulus normal.  Stenosis not present.  Regurgitation   absent.  Leaflets normal.  Leaflet motions normal.    Mitral Valve: Annulus normal.  Stenosis not present.  Regurgitation   absent.  Leaflets normal.  Leaflet motions normal.    Tricuspid Valve: Annulus normal.  Stenosis not present.  Regurgitation   absent.  Leaflets normal.  Leaflet motions normal.        Aorta: Ascending  Aorta: Size normal.  Dissection not present.  Plaque   thickness less than 3 mm.  Mobile plaque not present.    Aortic Arch: Size normal.    Dissection not present.   Plaque thickness   less than 3 mm.   Mobile plaque not present.    Descending Aorta: Size normal.    Dissection not present.   Plaque   thickness less than 3 mm.   Mobile plaque not present.      Right Atrium:  Size normal.   Spontaneous echo contrast not present.     Thrombus not present.   Tumor not present.   Device not present.     Left Atrium: Size normal.  Spontaneous echo contrast not present.    Thrombus not present.  Tumor not present.  Device not present.    Left atrial appendage normal.     Atrial Septum: Intra-atrial septal morphology normal.       Ventricular Septum: Intra-ventricular septum morphology normal.        Other Findings:   Pericardium:  normal. Pleural Effusion:  none. Pulmonary Arteries:    normal. Pulmonary Venous Flow:  normal. Cornoary sinus catheter present.    Post Intervention Findings  Procedure(s) performed:  CABG. Global function:  Unchanged. Regional wall   motion: Unchanged. Surgeon(s) notified of all postintervention findings:   Yes.                 Echocardiogram Comments  Echocardiogram comments:   Pre-CPB:  Grossly normal LV size with preserved systolic function and EF 62% by   biplane assessment. Normal LV diastolic function by E/A 1.4 and lateral e'   10.8 cm/s. Grossly normal RV size with no evidence of systolic dysfunction   by TAPSE 25 mm. STACY velocities > 4 cm/s with no visualization of SEC or   thrombus via 3D assessment. No PFO by CFD. Trace MR. Trileaflet aortic   valve without significant regurgitation or stenosis. No pleural or   pericardial effusion. No echo evidence of aortic dissection. Findings   communicated with surgical team in real time.    Post-CPB:  Globally unchanged biventricular size and function. No new RWMA. MR   trace-mild by visual appearance and 2D PISA EROA 0.06 cm^2. No significant    pleural effusion. No echo evidence of aortic dissection. Findings   communicated with surgical team in real time..   XR Chest Port 1 View    Narrative    Portable chest    INDICATION: Endotracheal tube positioning    COMPARISON: CT chest 7/1/2024    FINDINGS: Low inspiratory volumes. Left basilar thoracostomy tube. No  pneumothorax. Left atrial appendage ligation clip. Sternal fixation.  Endotracheal tube in the mid thoracic trachea. Mediastinal drain  tubing. Right IJ sheath tip in the low right internal jugular vein.  Bandlike densities in the left lung suggest mild edema or subsegmental  atelectasis.    Impression    IMPRESSION: Endotracheal tube presumably in the mid thoracic trachea.  Mild edema/atelectasis especially in the left lung.    ARMIDA GONZALEZ MD         SYSTEM ID:  [id number]   XR Chest Port 1 View    Impression    RESIDENT PRELIMINARY INTERPRETATION  Impression:   1. Interval removal of endotracheal tube. Otherwise stable support  devices.  2. Continued left greater than right perihilar predominant opacities,  likely mild pulmonary edema and atelectasis.

## 2024-08-05 ENCOUNTER — APPOINTMENT (OUTPATIENT)
Dept: OCCUPATIONAL THERAPY | Facility: CLINIC | Age: 74
DRG: 233 | End: 2024-08-05
Attending: SURGERY
Payer: MEDICARE

## 2024-08-05 ENCOUNTER — APPOINTMENT (OUTPATIENT)
Dept: PHYSICAL THERAPY | Facility: CLINIC | Age: 74
DRG: 233 | End: 2024-08-05
Attending: SURGERY
Payer: MEDICARE

## 2024-08-05 ENCOUNTER — APPOINTMENT (OUTPATIENT)
Dept: GENERAL RADIOLOGY | Facility: CLINIC | Age: 74
DRG: 233 | End: 2024-08-05
Attending: SURGERY
Payer: MEDICARE

## 2024-08-05 LAB
ALBUMIN SERPL BCG-MCNC: 3 G/DL (ref 3.5–5.2)
ALP SERPL-CCNC: 90 U/L (ref 40–150)
ALT SERPL W P-5'-P-CCNC: 6 U/L (ref 0–70)
ANION GAP SERPL CALCULATED.3IONS-SCNC: 10 MMOL/L (ref 7–15)
APTT PPP: 29 SECONDS (ref 22–38)
AST SERPL W P-5'-P-CCNC: 20 U/L (ref 0–45)
ATRIAL RATE - MUSE: NORMAL BPM
BILIRUB DIRECT SERPL-MCNC: 0.21 MG/DL (ref 0–0.3)
BILIRUB SERPL-MCNC: 0.4 MG/DL
BUN SERPL-MCNC: 45.4 MG/DL (ref 8–23)
CALCIUM SERPL-MCNC: 8.5 MG/DL (ref 8.8–10.4)
CHLORIDE SERPL-SCNC: 98 MMOL/L (ref 98–107)
CREAT SERPL-MCNC: 1.32 MG/DL (ref 0.67–1.17)
DIASTOLIC BLOOD PRESSURE - MUSE: NORMAL MMHG
EGFRCR SERPLBLD CKD-EPI 2021: 56 ML/MIN/1.73M2
ERYTHROCYTE [DISTWIDTH] IN BLOOD BY AUTOMATED COUNT: 19.3 % (ref 10–15)
FIBRINOGEN PPP-MCNC: 710 MG/DL (ref 170–510)
GLUCOSE BLDC GLUCOMTR-MCNC: 165 MG/DL (ref 70–99)
GLUCOSE BLDC GLUCOMTR-MCNC: 178 MG/DL (ref 70–99)
GLUCOSE BLDC GLUCOMTR-MCNC: 190 MG/DL (ref 70–99)
GLUCOSE BLDC GLUCOMTR-MCNC: 199 MG/DL (ref 70–99)
GLUCOSE SERPL-MCNC: 171 MG/DL (ref 70–99)
HCO3 SERPL-SCNC: 23 MMOL/L (ref 22–29)
HCT VFR BLD AUTO: 28.1 % (ref 40–53)
HGB BLD-MCNC: 8.8 G/DL (ref 13.3–17.7)
INR PPP: 1.11 (ref 0.85–1.15)
INTERPRETATION ECG - MUSE: NORMAL
MAGNESIUM SERPL-MCNC: 2.5 MG/DL (ref 1.7–2.3)
MCH RBC QN AUTO: 29.7 PG (ref 26.5–33)
MCHC RBC AUTO-ENTMCNC: 31.3 G/DL (ref 31.5–36.5)
MCV RBC AUTO: 95 FL (ref 78–100)
P AXIS - MUSE: NORMAL DEGREES
PHOSPHATE SERPL-MCNC: 3.1 MG/DL (ref 2.5–4.5)
PLATELET # BLD AUTO: 158 10E3/UL (ref 150–450)
POTASSIUM SERPL-SCNC: 4.2 MMOL/L (ref 3.4–5.3)
PR INTERVAL - MUSE: NORMAL MS
PROT SERPL-MCNC: 5.6 G/DL (ref 6.4–8.3)
QRS DURATION - MUSE: 128 MS
QT - MUSE: 390 MS
QTC - MUSE: 539 MS
R AXIS - MUSE: 48 DEGREES
RBC # BLD AUTO: 2.96 10E6/UL (ref 4.4–5.9)
SODIUM SERPL-SCNC: 131 MMOL/L (ref 135–145)
SYSTOLIC BLOOD PRESSURE - MUSE: NORMAL MMHG
T AXIS - MUSE: -2 DEGREES
VENTRICULAR RATE- MUSE: 115 BPM
WBC # BLD AUTO: 5.6 10E3/UL (ref 4–11)

## 2024-08-05 PROCEDURE — 250N000011 HC RX IP 250 OP 636

## 2024-08-05 PROCEDURE — 250N000013 HC RX MED GY IP 250 OP 250 PS 637: Performed by: PHYSICIAN ASSISTANT

## 2024-08-05 PROCEDURE — 99232 SBSQ HOSP IP/OBS MODERATE 35: CPT | Mod: 24 | Performed by: ANESTHESIOLOGY

## 2024-08-05 PROCEDURE — 97530 THERAPEUTIC ACTIVITIES: CPT | Mod: GP

## 2024-08-05 PROCEDURE — 71045 X-RAY EXAM CHEST 1 VIEW: CPT | Mod: 77

## 2024-08-05 PROCEDURE — 250N000013 HC RX MED GY IP 250 OP 250 PS 637

## 2024-08-05 PROCEDURE — 97110 THERAPEUTIC EXERCISES: CPT | Mod: GP

## 2024-08-05 PROCEDURE — 94660 CPAP INITIATION&MGMT: CPT

## 2024-08-05 PROCEDURE — 71045 X-RAY EXAM CHEST 1 VIEW: CPT | Mod: 26 | Performed by: RADIOLOGY

## 2024-08-05 PROCEDURE — 84100 ASSAY OF PHOSPHORUS: CPT | Performed by: PHYSICIAN ASSISTANT

## 2024-08-05 PROCEDURE — 71045 X-RAY EXAM CHEST 1 VIEW: CPT

## 2024-08-05 PROCEDURE — 80048 BASIC METABOLIC PNL TOTAL CA: CPT | Performed by: PHYSICIAN ASSISTANT

## 2024-08-05 PROCEDURE — 82248 BILIRUBIN DIRECT: CPT | Performed by: PHYSICIAN ASSISTANT

## 2024-08-05 PROCEDURE — 97535 SELF CARE MNGMENT TRAINING: CPT | Mod: GO

## 2024-08-05 PROCEDURE — 85610 PROTHROMBIN TIME: CPT | Performed by: PHYSICIAN ASSISTANT

## 2024-08-05 PROCEDURE — 83735 ASSAY OF MAGNESIUM: CPT | Performed by: PHYSICIAN ASSISTANT

## 2024-08-05 PROCEDURE — 250N000013 HC RX MED GY IP 250 OP 250 PS 637: Performed by: SURGERY

## 2024-08-05 PROCEDURE — 97530 THERAPEUTIC ACTIVITIES: CPT | Mod: GO

## 2024-08-05 PROCEDURE — 36415 COLL VENOUS BLD VENIPUNCTURE: CPT | Performed by: PHYSICIAN ASSISTANT

## 2024-08-05 PROCEDURE — 250N000011 HC RX IP 250 OP 636: Performed by: SURGERY

## 2024-08-05 PROCEDURE — 120N000002 HC R&B MED SURG/OB UMMC

## 2024-08-05 PROCEDURE — 85027 COMPLETE CBC AUTOMATED: CPT | Performed by: PHYSICIAN ASSISTANT

## 2024-08-05 PROCEDURE — 85384 FIBRINOGEN ACTIVITY: CPT | Performed by: PHYSICIAN ASSISTANT

## 2024-08-05 PROCEDURE — 999N000157 HC STATISTIC RCP TIME EA 10 MIN

## 2024-08-05 PROCEDURE — 85730 THROMBOPLASTIN TIME PARTIAL: CPT | Performed by: PHYSICIAN ASSISTANT

## 2024-08-05 RX ORDER — FUROSEMIDE 10 MG/ML
40 INJECTION INTRAMUSCULAR; INTRAVENOUS ONCE
Status: COMPLETED | OUTPATIENT
Start: 2024-08-05 | End: 2024-08-05

## 2024-08-05 RX ADMIN — Medication 5 MG: at 20:27

## 2024-08-05 RX ADMIN — HEPARIN SODIUM 5000 UNITS: 5000 INJECTION, SOLUTION INTRAVENOUS; SUBCUTANEOUS at 20:27

## 2024-08-05 RX ADMIN — ROSUVASTATIN CALCIUM 20 MG: 20 TABLET, FILM COATED ORAL at 08:03

## 2024-08-05 RX ADMIN — HEPARIN SODIUM 5000 UNITS: 5000 INJECTION, SOLUTION INTRAVENOUS; SUBCUTANEOUS at 12:19

## 2024-08-05 RX ADMIN — OXYBUTYNIN CHLORIDE 10 MG: 10 TABLET, EXTENDED RELEASE ORAL at 08:03

## 2024-08-05 RX ADMIN — FLUTICASONE PROPIONATE 1 SPRAY: 50 SPRAY, METERED NASAL at 20:27

## 2024-08-05 RX ADMIN — TAMSULOSIN HYDROCHLORIDE 0.4 MG: 0.4 CAPSULE ORAL at 08:03

## 2024-08-05 RX ADMIN — DILTIAZEM HYDROCHLORIDE 180 MG: 180 CAPSULE, COATED, EXTENDED RELEASE ORAL at 08:02

## 2024-08-05 RX ADMIN — INSULIN ASPART 2 UNITS: 100 INJECTION, SOLUTION INTRAVENOUS; SUBCUTANEOUS at 08:02

## 2024-08-05 RX ADMIN — ASPIRIN 81 MG CHEWABLE TABLET 162 MG: 81 TABLET CHEWABLE at 08:03

## 2024-08-05 RX ADMIN — HEPARIN SODIUM 5000 UNITS: 5000 INJECTION, SOLUTION INTRAVENOUS; SUBCUTANEOUS at 05:13

## 2024-08-05 RX ADMIN — FUROSEMIDE 40 MG: 10 INJECTION, SOLUTION INTRAVENOUS at 12:19

## 2024-08-05 RX ADMIN — INSULIN ASPART 3 UNITS: 100 INJECTION, SOLUTION INTRAVENOUS; SUBCUTANEOUS at 16:11

## 2024-08-05 RX ADMIN — PSYLLIUM HUSK 1 PACKET: 3.4 POWDER ORAL at 09:46

## 2024-08-05 RX ADMIN — INSULIN ASPART 3 UNITS: 100 INJECTION, SOLUTION INTRAVENOUS; SUBCUTANEOUS at 12:19

## 2024-08-05 ASSESSMENT — ACTIVITIES OF DAILY LIVING (ADL)
ADLS_ACUITY_SCORE: 42
ADLS_ACUITY_SCORE: 43
ADLS_ACUITY_SCORE: 42
ADLS_ACUITY_SCORE: 47
ADLS_ACUITY_SCORE: 43
ADLS_ACUITY_SCORE: 41
ADLS_ACUITY_SCORE: 43
ADLS_ACUITY_SCORE: 47
ADLS_ACUITY_SCORE: 43
ADLS_ACUITY_SCORE: 47
ADLS_ACUITY_SCORE: 43
ADLS_ACUITY_SCORE: 47
ADLS_ACUITY_SCORE: 47
ADLS_ACUITY_SCORE: 43
ADLS_ACUITY_SCORE: 41

## 2024-08-05 NOTE — PROGRESS NOTES
Care Management Follow Up    Length of Stay (days): 6    Expected Discharge Date:       Concerns to be Addressed:       Patient plan of care discussed at interdisciplinary rounds: Yes    Anticipated Discharge Disposition: Transitional Care     Anticipated Discharge Services: Other (see comment) (Rehab)  Anticipated Discharge DME: None    Patient/family educated on Medicare website which has current facility and service quality ratings: yes  Education Provided on the Discharge Plan: No  Patient/Family in Agreement with the Plan: yes    Referrals Placed by CM/SW:    Private pay costs discussed: transportation costs    Additional Information:    Met with pt and pt spouse at bedside to discuss discharge planning and current recommendations for TCU. Provided medicare care compare list for TCUs that are local and another list near pt home. Pt and spouse will review and choose 5+ options. SW discussed private pay transport cost in case it affected their decision making - spouse anticipates providing transportation at discharge. Pt and spouse asked what it would look like for pt to discharge home instead of TCU, SW explained home care and frequency of visits vs amount of therapy provided at TCU. Spouse and pt agreeable to TCU for a short stay at this time but would like to see how pt progresses with therapy for the rest of their hospitalization as pt would like to be able to safely discharge home.    TJ Cotter   [facility]  Covering 4A/E: [phone]

## 2024-08-05 NOTE — PLAN OF CARE
Neuro: LAO, following commands, afebrile. Ax1 with walker for pivot transfers    CV:   Remained in afib overnight, PVCs with activity. HR 80s-110s    Pulm:   RA while awake. AVAPS overnight. Using Flonase and PRN saline spray for nasal congestion.     GI/: diabetic/cardiac diet, good appetite. Multiple incontinent maroon bowel movements/clots passed overnight. Voiding spontaneously, urgency incontinence    Skin: LLE graft site, sternal incision TALAT    Drains/lines: CT x 3 (2 meds, 1 pleural) all to 1 atrium - minimal output. PIV SL    Plan:  -Transfer to floor pending bed availability  -Start fiber supplement for loose stools? Pt states he takes Metamucil at home

## 2024-08-05 NOTE — PROGRESS NOTES
No acute events. Ambulating halls x2, up to chair w/ Ax1-2. Denying pain. Using IS independently. CT x3 removed. Tolerating diet. Loose stool x2. Voiding, 40mg lasix given.     Plan: Transfer to floor.  For vital signs and complete assessments, please see documentation flowsheets.

## 2024-08-05 NOTE — PROGRESS NOTES
CV ICU PROGRESS NOTE    Jorje Hutchinson  [id number]  Admitted: 7/30/2024  5:30 AM      ASSESSMENT: Jorje Hutchinson is a 74 year old male with PMH of CAD sp coronary angioplasty w/stent placement of circumflex coronary artery in 2002, HTN, HLD, SVT, chronic afib (on dilat & apixaban), HERRERA on home CPAP, CKD, T2DM, OA, prostate cancer, and worsening morbid obesity. He presents to [facility] for CORONARY ARTERY BYPASS GRAFT x2 (LIMA-LAD, SVG-LPL), Pulmonary vein isolation with RF ablation, LIGATION, LEFT ATRIAL APPENDAGE, sternal closure with plates and wires.     OVERNIGHT EVENTS  - Persistent atrial fibrillation to 90-110s, new from PTA junctional rhythm.  - Multiple maroon BM with clots    TODAY'S PROGRESS:  - Floor status  - EP consulted for afib, medication selection  - Diltiazem increased to PTA dose (180 every day)   - Diuresis goal -1 L fluid status  - PTA Metamucil started for diarrhea    CO-MORBIDITIES:  Patient Active Problem List   Diagnosis    Diabetes mellitus, type 2 (H)    Coronary artery disease involving native coronary artery of native heart, unspecified whether angina present     PLAN:  Neuro/ pain/ sedation:  #Acute Postoperative pain, controlled  - Monitor neurological status. Notify the MD for any acute changes in exam.  - Pain: Tylenol, robaxin PRN    # Insomnia  - Melatonin 5 mg HS  - Trazodone PRN     Pulmonary care:   #Obstructive Sleep Apnea, CPAP  #Postoperative ventilation management, resolved  - Extubated on 7/30, tolerated well  - Titrate supplemental oxygen to maintain saturation above 92%.  - Pulmonary hygiene: Incentive spirometer every 15-30 minutes when awake, flutter valve, C&DB  - Home CPAP at night--intermittently using home vs AVAPS for best results   - Encouraged to increase IS use, OOB to chair, Cough and deep breathe   - CXR demonstrating improvement of right opacities/effusion on 8/5/2024    Cardiovascular:  #S/p CABG x2 (LIMA-LAD, SVG-LPL), Pulmonary vein isolation with RF  ablation, LIGATION, LEFT ATRIAL APPENDAGE, sternal closure with plates and wires on 7/30/24 by Dr. Wilson  #History of CAD sp coronary angioplasty w/stent of circumflex coronary a in 2002  #HTN  #HLD  #Supraventricular tachycardia  #Post-op vasoplegia  #Hx of a fib (PTA diltiazem, Apixaban)   Recent echo on 6/20/24 with LVEF of 60-65%  - Monitor hemodynamic status. Currently no pressors   - Goal MAP>65, SBP<130  - Restarted PTA rosuvastatin 20, asa 162 mg (PTA 81 mg)  - Pacing wires capped 8/4/2024, removed  - PTA diltiazem ER increased to 180mg every day   - EP consult for intrinsic recovery, antiarrhythmic medication selection, whether to restart plavix  - PTA meds on hold: clopidogrel 75 mg am, losartan 25 mg at bedtime, sotalol 80 mg qd, spironolactone 25 mg every day, torsemide 20 mg, Apixaban 5 mg BID     GI care/ Nutrition:  #Concern for protein-calorie malnutrition  # Constipation - resolved   #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  - Diet: Combo of moderate consistent CHO diet and low saturated fat sodium less than 2400 mg  - Discontinued protonix as not PTA med  - Continue bowel regimen: miralax, senna, PRN milk of mag  - Added PTA metamucil on 8/5/2024 for diarrhea    Renal/ Fluid Balance/ Electrolytes:   #CKD w/o dialysis  #Acute Kidney Injury - Improving  # Hyponatremia - Improving  BL creat appears to be ~ 1.42  - Strict I/O, daily weights  - Avoid/limit nephrotoxins as able  - Replete lytes PRN per protocol  - Restarted PTA tamsulosin 0.4 mg (on 8/1/2024) and Oxybutynin ER 10 mg every day (on 8/3/2024)  - Daily BMP, hepatic panel, phos, mag  - Monitoring hyponatremia with serial BMP  - I/O goal - 1L: Lasix 40 mg IV x1 on 8/5/2024    Endocrine:  #Stress-induced hyperglycemia  #T2DM, insulin dependent  Preop A1c 6.7% on 7/1/24  - Insulin gtt discontinued  - Goal BG <180 for optimal healing  - PTA meds: Empagliflozin 10 mg every day, insulin glargine 38u subcutaneous AM to be held  - sliding scale  insulin and glargine    ID/ Antibiotics:  #Stress induced leukocytosis, improved   - Completed perioperative regimen  - Continue to monitor fever curve, WBC and inflammatory markers as appropriate    Heme:  #Stress induced leukocytosis  #Acute blood loss anemia, improving  #Acute blood loss thrombocytopenia  No s/sx active bleeding  - Continue to monitor  - Daily CBC, Coag panel  - Heparin subQ 5000 TID  for PPX   - Transfused 1 U PRBCs on 8/1/2024 with noted improvement in Hgb on 8/2/2024  - Goal Hgb > 7     MSK/ Skin:  #Sternotomy  #Surgical Incision  #Chronic left knee pain  - Sternal precautions  - Postoperative incision management per protocol  - PT/OT/CR - SW/RNCC following for transition of care  - PTA allopurinol 100 mg bid held    Prophylaxis:  - Mechanical prophylaxis for DVT  - Chemical DVT prophylaxis - subcutaneous heparin    Lines/ tubes/ drains:  - PIV  - CTs     Disposition:  - Floor status    ICU Checklist:  F - Feeding: in place  A - Analgesia: controlled   S - Sedation: N/A   T - Thromboembolic prophylaxis: SQH     H - Head of bed elevated - yes & OOB to chair   U - Ulcer prophylaxis: N/A  G - Glycemic control: established  S - SBT: N/A     B - Bowel regimen: established  I - Indwelling catheter: removed  D - De-escalation of antibiotics: completed     Patient seen, findings and plan discussed with CVICU staff, Dr Bundy.  Critical care time exclusive of procedures:  min     Maryjane San, MS4      I agree with the above findings and plan.  Nathan Gregorio MD  PGY1 General Surgery    ====================================  SUBJECTIVE  In recliner. Reports improved sleep overnight, endorses mutliple instances of diarrhea over past 24 hrs, reports taking metamucil at home for symptoms. Otherwise has no concerns.      OBJECTIVE  1. VITAL SIGNS  Temp:  [98  F (36.7  C)-98.6  F (37  C)] 98  F (36.7  C)  Pulse:  [] 99  Resp:  [18-20] 18  BP: ()/(59-73) 127/59  SpO2:  [89 %-100 %] 94  %  Resp: 18    2. INTAKE/ OUTPUT  I/O last 3 completed shifts:  In: 1200 [P.O.:1200]  Out: 1309 [Urine:1050; Chest Tube:259]    3. PHYSICAL EXAMINATION  GEN:  no acute distress, sitting in chair   NEURO:  no focal deficits, moving extremities spontaneously, alert, oriented  CV:  heart rate irregularly irregular  PULM:  non-labored breathing, no stridor or wheezes, lungs CTA bilaterally, NC weaned to RA, nasal congestion   MSK:  extremities warm and well perfused  SKIN:  no rash, sternotomy site c/d/I  CT:  to suction, serosang output, no airleak or crepitus, tubes dry    4. INVESTIGATIONS  Arterial Blood Gases   Recent Labs   Lab 07/31/24  0259 07/30/24  2308 07/30/24 2143 07/30/24 1955   PH 7.34* 7.34* 7.29* 7.34*   PCO2 44 44 51* 45   PO2 129* 140* 133* 145*   HCO3 24 24 25 24     Complete Blood Count   Recent Labs   Lab 08/04/24  0451 08/03/24  0543 08/02/24  0406 08/01/24  0537   WBC 5.4 7.0 8.5 9.2   HGB 8.9* 8.9* 8.7* 7.9*   * 139* 100* 98*     Basic Metabolic Panel  Recent Labs   Lab 08/04/24  2110 08/04/24  1832 08/04/24  1549 08/04/24  0949 08/04/24  0840 08/04/24  0451 08/03/24  0904 08/03/24  0543 08/02/24  1812 08/02/24  1310 08/02/24  0409 08/02/24  0406   NA  --   --   --   --   --  132*  --  130*  --  128*  --  127*   POTASSIUM  --   --  4.7  --   --  4.1  --  5.0  --  4.8  --  5.1   CHLORIDE  --   --   --   --   --  99  --  97*  --  98  --  96*   CO2  --   --   --   --   --  24  --  20*  --  23  --  22   BUN  --   --   --   --   --  47.6*  --  54.7*  --  49.6*  --  51.7*   CR  --   --   --   --   --  1.31*  --  1.50*  --  1.62*  --  1.85*   * 213*  --  168* 148* 158*   < > 223*   < > 253*   < > 264*    < > = values in this interval not displayed.     Liver Function Tests  Recent Labs   Lab 08/04/24  0451 08/03/24  0543 08/02/24  0406 08/01/24  0444   AST 16 19 22 36   ALT <5 <5 <5 <5   ALKPHOS 82 86 92 80   BILITOTAL 0.5 0.5 0.5 0.4   ALBUMIN 3.0* 2.7* 3.0* 3.0*   INR 1.14 1.26* 1.34*  "1.42*     Pancreatic Enzymes  No lab results found in last 7 days.  Coagulation Profile  Recent Labs   Lab 08/04/24  0451 08/03/24  0543 08/02/24  0406 08/01/24  0444   INR 1.14 1.26* 1.34* 1.42*   PTT 29 33 35 34     Lactate  Invalid input(s): \"LACTATE\"    5. RADIOLOGY  Recent Results (from the past 24 hour(s))   RAPHAEL with Report    Narrative    Tyrone Barrientos DO     7/30/2024  3:17 PM  Perioperative RAPHAEL Procedure Note    Staff -        Resident/Fellow: Tyrone Barrientos DO       Performed By: fellow  Preanesthesia Checklist:  Patient identified, IV assessed, risks and   benefits discussed, monitors and equipment assessed, procedure being   performed at surgeon's request and anesthesia consent obtained.    RAPHAEL Probe Insertion    Probe Status PRE Insertion: NO obvious damage  Probe type:  Adult 3D  Bite block used:   Oral Airway  Insertion Technique: Jaw Lift  Insertion complications: None obvious  Billing Report:RAPHAEL report by Anesthesiologist (See Separate Report note)  Probe Status POST Removal: NO obvious damage    RAPHAEL Report  General Procedure Information  Images for this study have been archived.  Modalities: Color flow mapping, PW Doppler, CW Doppler, 3D and 2D  Diagnostic indications for RAPHAEL:   Cardiomyopathy, other  Echocardiographic and Doppler Measurements  Right Ventricle:  Cavity size normal.    Hypertrophy not present.     Thrombus not present.    Global function normal.     Left Ventricle:  Cavity size normal.    Hypertrophy not present.     Thrombus not present.   Global Function normal.       Ventricular Regional Function:  1- Basal Anteroseptal:  normal  2- Basal Anterior:  normal  3- Basal Anterolateral:  normal  4- Basal Inferolateral:  normal  5- Basal Inferior:  normal  6- Basal Inferoseptal:  normal  7- Mid Anteroseptal:  normal  8- Mid Anterior:  normal  9- Mid Anterolateral:  normal  10- Mid Inferolateral:  normal  11- Mid Inferior:  normal  12- Mid Inferoseptal:  normal  13- Apical Anterior:  " normal  14- Apical Lateral:  normal  15- Apical Inferior:  normal  16- Apical Septal:  normal  17- [place]:  normal    Valves  Aortic Valve: Annulus normal.  Stenosis not present.  Regurgitation   absent.  Leaflets normal.  Leaflet motions normal.    Mitral Valve: Annulus normal.  Stenosis not present.  Regurgitation   absent.  Leaflets normal.  Leaflet motions normal.    Tricuspid Valve: Annulus normal.  Stenosis not present.  Regurgitation   absent.  Leaflets normal.  Leaflet motions normal.        Aorta: Ascending Aorta: Size normal.  Dissection not present.  Plaque   thickness less than 3 mm.  Mobile plaque not present.    Aortic Arch: Size normal.    Dissection not present.   Plaque thickness   less than 3 mm.   Mobile plaque not present.    Descending Aorta: Size normal.    Dissection not present.   Plaque   thickness less than 3 mm.   Mobile plaque not present.      Right Atrium:  Size normal.   Spontaneous echo contrast not present.     Thrombus not present.   Tumor not present.   Device not present.     Left Atrium: Size normal.  Spontaneous echo contrast not present.    Thrombus not present.  Tumor not present.  Device not present.    Left atrial appendage normal.     Atrial Septum: Intra-atrial septal morphology normal.       Ventricular Septum: Intra-ventricular septum morphology normal.        Other Findings:   Pericardium:  normal. Pleural Effusion:  none. Pulmonary Arteries:    normal. Pulmonary Venous Flow:  normal. Cornoary sinus catheter present.    Post Intervention Findings  Procedure(s) performed:  CABG. Global function:  Unchanged. Regional wall   motion: Unchanged. Surgeon(s) notified of all postintervention findings:   Yes.                 Echocardiogram Comments  Echocardiogram comments:   Pre-CPB:  Grossly normal LV size with preserved systolic function and EF 62% by   biplane assessment. Normal LV diastolic function by E/A 1.4 and lateral e'   10.8 cm/s. Grossly normal RV size with no  evidence of systolic dysfunction   by TAPSE 25 mm. STACY velocities > 4 cm/s with no visualization of SEC or   thrombus via 3D assessment. No PFO by CFD. Trace MR. Trileaflet aortic   valve without significant regurgitation or stenosis. No pleural or   pericardial effusion. No echo evidence of aortic dissection. Findings   communicated with surgical team in real time.    Post-CPB:  Globally unchanged biventricular size and function. No new RWMA. MR   trace-mild by visual appearance and 2D PISA EROA 0.06 cm^2. No significant   pleural effusion. No echo evidence of aortic dissection. Findings   communicated with surgical team in real time..   XR Chest Port 1 View    Narrative    Portable chest    INDICATION: Endotracheal tube positioning    COMPARISON: CT chest 7/1/2024    FINDINGS: Low inspiratory volumes. Left basilar thoracostomy tube. No  pneumothorax. Left atrial appendage ligation clip. Sternal fixation.  Endotracheal tube in the mid thoracic trachea. Mediastinal drain  tubing. Right IJ sheath tip in the low right internal jugular vein.  Bandlike densities in the left lung suggest mild edema or subsegmental  atelectasis.    Impression    IMPRESSION: Endotracheal tube presumably in the mid thoracic trachea.  Mild edema/atelectasis especially in the left lung.    ARMIDA GONZALEZ MD         SYSTEM ID:  [id number]   XR Chest Port 1 View    Impression    RESIDENT PRELIMINARY INTERPRETATION  Impression:   1. Interval removal of endotracheal tube. Otherwise stable support  devices.  2. Continued left greater than right perihilar predominant opacities,  likely mild pulmonary edema and atelectasis.

## 2024-08-06 ENCOUNTER — APPOINTMENT (OUTPATIENT)
Dept: PHYSICAL THERAPY | Facility: CLINIC | Age: 74
DRG: 233 | End: 2024-08-06
Attending: SURGERY
Payer: MEDICARE

## 2024-08-06 LAB
ABO/RH(D): NORMAL
ALBUMIN SERPL BCG-MCNC: 3 G/DL (ref 3.5–5.2)
ALP SERPL-CCNC: 85 U/L (ref 40–150)
ALT SERPL W P-5'-P-CCNC: 5 U/L (ref 0–70)
ANION GAP SERPL CALCULATED.3IONS-SCNC: 8 MMOL/L (ref 7–15)
ANTIBODY SCREEN: NEGATIVE
APTT PPP: 32 SECONDS (ref 22–38)
AST SERPL W P-5'-P-CCNC: 15 U/L (ref 0–45)
BILIRUB DIRECT SERPL-MCNC: <0.2 MG/DL (ref 0–0.3)
BILIRUB SERPL-MCNC: 0.4 MG/DL
BUN SERPL-MCNC: 42.8 MG/DL (ref 8–23)
CALCIUM SERPL-MCNC: 8.5 MG/DL (ref 8.8–10.4)
CHLORIDE SERPL-SCNC: 100 MMOL/L (ref 98–107)
CREAT SERPL-MCNC: 1.36 MG/DL (ref 0.67–1.17)
EGFRCR SERPLBLD CKD-EPI 2021: 54 ML/MIN/1.73M2
ERYTHROCYTE [DISTWIDTH] IN BLOOD BY AUTOMATED COUNT: 18.7 % (ref 10–15)
FIBRINOGEN PPP-MCNC: 638 MG/DL (ref 170–510)
GLUCOSE BLDC GLUCOMTR-MCNC: 144 MG/DL (ref 70–99)
GLUCOSE BLDC GLUCOMTR-MCNC: 164 MG/DL (ref 70–99)
GLUCOSE BLDC GLUCOMTR-MCNC: 170 MG/DL (ref 70–99)
GLUCOSE BLDC GLUCOMTR-MCNC: 232 MG/DL (ref 70–99)
GLUCOSE SERPL-MCNC: 163 MG/DL (ref 70–99)
HCO3 SERPL-SCNC: 26 MMOL/L (ref 22–29)
HCT VFR BLD AUTO: 27.4 % (ref 40–53)
HGB BLD-MCNC: 8.5 G/DL (ref 13.3–17.7)
INR PPP: 1.25 (ref 0.85–1.15)
MAGNESIUM SERPL-MCNC: 2.4 MG/DL (ref 1.7–2.3)
MCH RBC QN AUTO: 29.7 PG (ref 26.5–33)
MCHC RBC AUTO-ENTMCNC: 31 G/DL (ref 31.5–36.5)
MCV RBC AUTO: 96 FL (ref 78–100)
PHOSPHATE SERPL-MCNC: 3.3 MG/DL (ref 2.5–4.5)
PLATELET # BLD AUTO: 153 10E3/UL (ref 150–450)
POTASSIUM SERPL-SCNC: 4.2 MMOL/L (ref 3.4–5.3)
PROT SERPL-MCNC: 5.3 G/DL (ref 6.4–8.3)
RBC # BLD AUTO: 2.86 10E6/UL (ref 4.4–5.9)
SODIUM SERPL-SCNC: 134 MMOL/L (ref 135–145)
SPECIMEN EXPIRATION DATE: NORMAL
WBC # BLD AUTO: 5.5 10E3/UL (ref 4–11)

## 2024-08-06 PROCEDURE — 85384 FIBRINOGEN ACTIVITY: CPT | Performed by: PHYSICIAN ASSISTANT

## 2024-08-06 PROCEDURE — 84100 ASSAY OF PHOSPHORUS: CPT | Performed by: STUDENT IN AN ORGANIZED HEALTH CARE EDUCATION/TRAINING PROGRAM

## 2024-08-06 PROCEDURE — 85027 COMPLETE CBC AUTOMATED: CPT | Performed by: PHYSICIAN ASSISTANT

## 2024-08-06 PROCEDURE — 250N000013 HC RX MED GY IP 250 OP 250 PS 637: Performed by: SURGERY

## 2024-08-06 PROCEDURE — 36415 COLL VENOUS BLD VENIPUNCTURE: CPT | Performed by: SURGERY

## 2024-08-06 PROCEDURE — 83735 ASSAY OF MAGNESIUM: CPT | Performed by: STUDENT IN AN ORGANIZED HEALTH CARE EDUCATION/TRAINING PROGRAM

## 2024-08-06 PROCEDURE — 258N000003 HC RX IP 258 OP 636

## 2024-08-06 PROCEDURE — 250N000011 HC RX IP 250 OP 636

## 2024-08-06 PROCEDURE — 250N000011 HC RX IP 250 OP 636: Performed by: SURGERY

## 2024-08-06 PROCEDURE — 250N000013 HC RX MED GY IP 250 OP 250 PS 637

## 2024-08-06 PROCEDURE — 99232 SBSQ HOSP IP/OBS MODERATE 35: CPT | Mod: 24 | Performed by: ANESTHESIOLOGY

## 2024-08-06 PROCEDURE — 97530 THERAPEUTIC ACTIVITIES: CPT | Mod: GP

## 2024-08-06 PROCEDURE — 97110 THERAPEUTIC EXERCISES: CPT | Mod: GP

## 2024-08-06 PROCEDURE — 120N000005 HC R&B MS OVERFLOW UMMC

## 2024-08-06 PROCEDURE — 86923 COMPATIBILITY TEST ELECTRIC: CPT | Performed by: PHYSICIAN ASSISTANT

## 2024-08-06 PROCEDURE — 99233 SBSQ HOSP IP/OBS HIGH 50: CPT | Performed by: NURSE PRACTITIONER

## 2024-08-06 PROCEDURE — 80053 COMPREHEN METABOLIC PANEL: CPT | Performed by: PHYSICIAN ASSISTANT

## 2024-08-06 PROCEDURE — 85730 THROMBOPLASTIN TIME PARTIAL: CPT | Performed by: PHYSICIAN ASSISTANT

## 2024-08-06 PROCEDURE — 999N000248 HC STATISTIC IV INSERT WITH US BY RN

## 2024-08-06 PROCEDURE — 36415 COLL VENOUS BLD VENIPUNCTURE: CPT

## 2024-08-06 PROCEDURE — 250N000013 HC RX MED GY IP 250 OP 250 PS 637: Performed by: PHYSICIAN ASSISTANT

## 2024-08-06 PROCEDURE — 86900 BLOOD TYPING SEROLOGIC ABO: CPT | Performed by: SURGERY

## 2024-08-06 PROCEDURE — 85610 PROTHROMBIN TIME: CPT | Performed by: PHYSICIAN ASSISTANT

## 2024-08-06 PROCEDURE — 86923 COMPATIBILITY TEST ELECTRIC: CPT

## 2024-08-06 PROCEDURE — 999N000215 HC STATISTIC HFNC ADULT NON-CPAP

## 2024-08-06 RX ORDER — FUROSEMIDE 10 MG/ML
60 INJECTION INTRAMUSCULAR; INTRAVENOUS ONCE
Status: COMPLETED | OUTPATIENT
Start: 2024-08-06 | End: 2024-08-06

## 2024-08-06 RX ORDER — SOTALOL HYDROCHLORIDE 80 MG/1
80 TABLET ORAL DAILY
Status: DISCONTINUED | OUTPATIENT
Start: 2024-08-06 | End: 2024-08-06

## 2024-08-06 RX ORDER — DILTIAZEM HYDROCHLORIDE 30 MG/1
30 TABLET, FILM COATED ORAL EVERY 8 HOURS SCHEDULED
Status: DISCONTINUED | OUTPATIENT
Start: 2024-08-07 | End: 2024-08-10 | Stop reason: HOSPADM

## 2024-08-06 RX ADMIN — HEPARIN SODIUM 5000 UNITS: 5000 INJECTION, SOLUTION INTRAVENOUS; SUBCUTANEOUS at 20:00

## 2024-08-06 RX ADMIN — INSULIN ASPART 4 UNITS: 100 INJECTION, SOLUTION INTRAVENOUS; SUBCUTANEOUS at 16:00

## 2024-08-06 RX ADMIN — FLUTICASONE PROPIONATE 1 SPRAY: 50 SPRAY, METERED NASAL at 19:51

## 2024-08-06 RX ADMIN — INSULIN ASPART 1 UNITS: 100 INJECTION, SOLUTION INTRAVENOUS; SUBCUTANEOUS at 07:54

## 2024-08-06 RX ADMIN — ROSUVASTATIN CALCIUM 20 MG: 20 TABLET, FILM COATED ORAL at 07:54

## 2024-08-06 RX ADMIN — OXYBUTYNIN CHLORIDE 10 MG: 10 TABLET, EXTENDED RELEASE ORAL at 07:54

## 2024-08-06 RX ADMIN — AMIODARONE HYDROCHLORIDE 1 MG/MIN: 50 INJECTION, SOLUTION INTRAVENOUS at 16:34

## 2024-08-06 RX ADMIN — TAMSULOSIN HYDROCHLORIDE 0.4 MG: 0.4 CAPSULE ORAL at 07:54

## 2024-08-06 RX ADMIN — FUROSEMIDE 60 MG: 10 INJECTION, SOLUTION INTRAMUSCULAR; INTRAVENOUS at 07:54

## 2024-08-06 RX ADMIN — INSULIN ASPART 1 UNITS: 100 INJECTION, SOLUTION INTRAVENOUS; SUBCUTANEOUS at 12:47

## 2024-08-06 RX ADMIN — HEPARIN SODIUM 5000 UNITS: 5000 INJECTION, SOLUTION INTRAVENOUS; SUBCUTANEOUS at 04:25

## 2024-08-06 RX ADMIN — ASPIRIN 81 MG CHEWABLE TABLET 162 MG: 81 TABLET CHEWABLE at 07:54

## 2024-08-06 RX ADMIN — DILTIAZEM HYDROCHLORIDE 180 MG: 180 CAPSULE, COATED, EXTENDED RELEASE ORAL at 07:54

## 2024-08-06 RX ADMIN — PSYLLIUM HUSK 1 PACKET: 3.4 POWDER ORAL at 07:54

## 2024-08-06 RX ADMIN — AMIODARONE HYDROCHLORIDE 150 MG: 1.5 INJECTION, SOLUTION INTRAVENOUS at 16:23

## 2024-08-06 RX ADMIN — Medication 5 MG: at 19:51

## 2024-08-06 RX ADMIN — HEPARIN SODIUM 5000 UNITS: 5000 INJECTION, SOLUTION INTRAVENOUS; SUBCUTANEOUS at 12:47

## 2024-08-06 ASSESSMENT — ACTIVITIES OF DAILY LIVING (ADL)
ADLS_ACUITY_SCORE: 40
ADLS_ACUITY_SCORE: 42
ADLS_ACUITY_SCORE: 41
ADLS_ACUITY_SCORE: 41
ADLS_ACUITY_SCORE: 40
ADLS_ACUITY_SCORE: 38
ADLS_ACUITY_SCORE: 38
ADLS_ACUITY_SCORE: 41
ADLS_ACUITY_SCORE: 40
ADLS_ACUITY_SCORE: 41
ADLS_ACUITY_SCORE: 42
ADLS_ACUITY_SCORE: 42
ADLS_ACUITY_SCORE: 41
ADLS_ACUITY_SCORE: 42
ADLS_ACUITY_SCORE: 41
ADLS_ACUITY_SCORE: 41
ADLS_ACUITY_SCORE: 38
ADLS_ACUITY_SCORE: 41
ADLS_ACUITY_SCORE: 42

## 2024-08-06 NOTE — CONSULTS
Electrophysiology Consultation Note   EP Attending: .   Reason for consultation: Atrial Fibrillation.   Provider requesting consultation:  CV[facility] Service.  Date of Service: 8/6/2024      HPI:   Mr. Hutchinson is a 74 year old male who has a medical history significant for CAD s/p PCI LCx 2002, SVT, PAT/AFL (CHADSVASC 5), HTN, HLD, HERRERA (uses CPAP), CKD, DM2, OA, prostate CA s/p radiation c/b proctitis, GIB, and obesity.   He had a prior history of CAD and had prior PCI to LCx in 2002. He also has a history of AT/AFL and had been on Sotalol and Diltiazem as well as Eliquis for stroke prophylaxis. He also occasionally take PRN short acting diltiazem with palpitations. He then developed significant GALLARDO and had a positive stress test. Subsequent coronary angiogram showed 70% occulsion of the proximal LAD, 60% distal LAD, 30% proximal LCx, 30% distal LCx, 90% pRCA. He has been on Plavix and Eliquis but was having ongoing bleeding issues 2/2 prior radiation therapy/proctitis. Eliquis was stopped 5/14/24 which did help, but even on Plavix only he had some bleeding. Thus, he was referred for consideration for CABG instead of percutaneous revascularization given concern for inability to tolerate needed DAPT. He underwent CABG X2 (LIMA-LAD, SVG-LPL) with concomitant surgical PVI RFA and STACY ligation on 7/30/24. He was extubated POD0 and uses CPAP at night. Post operatively, he was noted to have some junctional rhythm ~50 bpm and was using temporary epicardial pacing. Pre-operatively his baseline ECG showed SR with 1 AVB and RBBB. His intrinsic rhythm recovered. An OSH recent echo showed LVEF 60-65% and Grade 1 diastolic dysfunction. He went into AF with RVR on 8/3/24 around 0500 and has remained in AF. His diltiazem was increased. VSS. SCr 11.36, GFR 54, , WBC 5.5 Hgb 8.5. Current cardiac medications: ASA, Diltiazem, and Crestor.   Past Medical History:   Past Medical History:   Diagnosis Date    CAD  (coronary artery disease)     HLD (hyperlipidemia)     HTN (hypertension)     Morbid obesity (H)     HERRERA (obstructive sleep apnea)     Osteoarthritis     Proctitis     radiation induced    Prostate cancer (H)     SVT (supraventricular tachycardia) (H24)      Past Surgical History:   Past Surgical History:   Procedure Laterality Date    AS TOTAL KNEE ARTHROPLASTY Right     BYPASS GRAFT ARTERY CORONARY N/A 7/30/2024    Procedure: CORONARY ARTERY BYPASS GRAFT;  Surgeon: Deniz Wilson MD;  Location: [facility] OR    CARDIAC CATHERIZATION  2002    COLONOSCOPY  2006    CORONARY ANGIOPLASTY  2002    w/ stent    CV CORONARY ANGIOGRAM  05/22/2024    EXC SKIN BENIG >4CM REMAINDR BODY Right 1989    forearm & neck    MAZE PROCEDURE N/A 7/30/2024    Procedure: WINTER MAZE PROCEDURE;  Surgeon: Deniz Wilson MD;  Location: [facility] OR    OR LIGATION, LEFT ATRIAL APPENDAGE N/A 7/30/2024    Procedure: LIGATION, LEFT ATRIAL APPENDAGE, OPEN **LATEX ALLERGY**;  Surgeon: Deniz Wilson MD;  Location: [facility] OR     Allergies: Per MAR     Allergies   Allergen Reactions    Shellfish-Derived Products Anaphylaxis, Difficulty breathing and Photosensitivity    Shrimp      Other Reaction(s): Swelling of eye, Swelling of throat    Latex Blisters, Dermatitis, Hives, Itching and Rash     Medications:   Per MAR current outpatient cardiovascular medications include:   Medications Prior to Admission   Medication Sig Dispense Refill Last Dose    allopurinol (ZYLOPRIM) 100 MG tablet Take 100 mg by mouth 2 times daily   7/30/2024    aspirin 81 MG EC tablet Take 81 mg by mouth every morning   7/29/2024    clopidogrel (PLAVIX) 75 MG tablet Take 75 mg by mouth every morning   Past Week    diltiazem (CARDIZEM) 30 MG tablet Take 30 mg by mouth as needed   7/30/2024    diltiazem ER COATED BEADS (CARDIZEM CD/CARTIA XT) 120 MG 24 hr capsule Take 120 mg by mouth every morning   7/30/2024    empagliflozin (JARDIANCE) 10 MG TABS tablet Take 10 mg by mouth every morning   Unknown     exenatide ER (BYDUREON BCISE) 2 MG/0.85ML auto-injector Inject 2 mg Subcutaneous every 7 days Every Sunday   Past Week    insulin glargine (LANTUS SOLOSTAR) 100 UNIT/ML pen Inject 38 Units Subcutaneous every morning   7/29/2024    losartan (COZAAR) 25 MG tablet Take 25 mg by mouth at bedtime   Unknown    magnesium chloride 535 (64 Mg) MG TBEC CR tablet Take 535 mg by mouth 2 times daily   Past Week    oxyBUTYnin ER (DITROPAN XL) 10 MG 24 hr tablet Take 10 mg by mouth every morning   7/30/2024    sotalol (BETAPACE) 80 MG tablet Take 80 mg by mouth every morning   7/30/2024    spironolactone (ALDACTONE) 25 MG tablet Take 25 mg by mouth every morning   7/29/2024    tamsulosin (FLOMAX) 0.4 MG capsule Take 0.4 mg by mouth at bedtime   7/30/2024    torsemide (DEMADEX) 20 MG tablet Take 20 mg by mouth every morning   7/29/2024    triamcinolone (KENALOG) 0.1 % external cream Apply 1 Application topically as needed   Unknown    vitamin D3 (CHOLECALCIFEROL) 10 MCG (400 UNIT) capsule Take 1 capsule by mouth every morning   Past Week     No current outpatient medications on file.     Current Facility-Administered Medications   Medication Dose Route Frequency Provider Last Rate Last Admin    acetaminophen (TYLENOL) tablet 650 mg  650 mg Oral or Feeding Tube Q4H PRN Blayne Hammond MD        aspirin (ASA) chewable tablet 162 mg  162 mg Oral or NG Tube Daily Blayne Hammond MD   162 mg at 08/06/24 0754    bisacodyl (DULCOLAX) suppository 10 mg  10 mg Rectal Daily PRN Blayne Hammond MD        glucose gel 15-30 g  15-30 g Oral Q15 Min PRN Blayne Hammond MD        Or    dextrose 50 % injection 25-50 mL  25-50 mL Intravenous Q15 Min PRN Blayne Hammond MD        Or    glucagon injection 1 mg  1 mg Subcutaneous Q15 Min PRN Blayne Hammond MD        diltiazem ER COATED BEADS (CARDIZEM CD/CARTIA XT) 24 hr capsule 180 mg  180 mg Oral Daily Emy Garcia APRN CNP   180 mg at 08/06/24 0756     fluticasone (FLONASE) 50 MCG/ACT spray 1 spray  1 spray Both Nostrils QPM Lachelle Mauricio MD   1 spray at 08/05/24 2027    heparin ANTICOAGULANT injection 5,000 Units  5,000 Units Subcutaneous Q8H Blayne Hammond MD   5,000 Units at 08/06/24 0425    hydrALAZINE (APRESOLINE) injection 10 mg  10 mg Intravenous Q30 Min PRN Blayne Hammond MD        insulin aspart (NovoLOG) injection (RAPID ACTING)  1-10 Units Subcutaneous TID AC Kevin Aguila PA-C   1 Units at 08/06/24 0754    insulin aspart (NovoLOG) injection (RAPID ACTING)  1-7 Units Subcutaneous At Bedtime Kevin Aguila PA-C   3 Units at 08/04/24 2112    insulin glargine (LANTUS PEN) injection 20 Units  20 Units Subcutaneous BID Emy Garcia, LESLIE CNP   20 Units at 08/06/24 0754    lidocaine (LMX4) cream   Topical Q1H PRN Blayne Hammond MD        lidocaine 1 % 0.1-1 mL  0.1-1 mL Other Q1H PRN Blayne Hammond MD        magnesium hydroxide (MILK OF MAGNESIA) suspension 30 mL  30 mL Oral or Feeding Tube Daily PRN Blayne Hammond MD   30 mL at 08/01/24 1717    melatonin tablet 5 mg  5 mg Oral QPM Mallika Del Angel, RPH   5 mg at 08/05/24 2027    methocarbamol (ROBAXIN) tablet 500 mg  500 mg Oral or Feeding Tube Q6H PRN Blayne Hammond MD   500 mg at 08/02/24 0108    naloxone (NARCAN) injection 0.2 mg  0.2 mg Intravenous Q2 Min PRN Deniz Wilson MD        Or    naloxone (NARCAN) injection 0.4 mg  0.4 mg Intravenous Q2 Min PRN Deniz Wilson MD        Or    naloxone (NARCAN) injection 0.2 mg  0.2 mg Intramuscular Q2 Min PRN Deniz Wilson MD        Or    naloxone (NARCAN) injection 0.4 mg  0.4 mg Intramuscular Q2 Min PRN Deniz Wilson MD        ondansetron (ZOFRAN ODT) ODT tab 4 mg  4 mg Oral Q6H PRN Blayne Hammond MD        Or    ondansetron (ZOFRAN) injection 4 mg  4 mg Intravenous Q6H PRN Blayne Hammond MD        oxyBUTYnin ER (DITROPAN XL) 24 hr tablet 10 mg  10 mg Oral TONIO Garcia,  LESLIE Quevedo CNP   10 mg at 08/06/24 0754    Patient may continue current oral medications   Does not apply Continuous PRN Neal Bray MD        polyethylene glycol (MIRALAX) Packet 17 g  17 g Oral or Feeding Tube Daily PRN Tony Lakhani MD        polyethylene glycol (MIRALAX) Packet 17 g  17 g Oral or Feeding Tube Daily Blayne Hammond MD   17 g at 08/01/24 0902    prochlorperazine (COMPAZINE) injection 5 mg  5 mg Intravenous Q6H PRN Blayne Hammond MD        Or    prochlorperazine (COMPAZINE) tablet 5 mg  5 mg Oral or Feeding Tube Q6H PRN Blayne Hammond MD        psyllium (METAMUCIL/KONSYL) Packet 1 packet  1 packet Oral Daily Nathan Gregorio MD   1 packet at 08/06/24 0754    Reason beta blocker order not selected   Does not apply DOES NOT GO TO Blayne Campbell MD        rosuvastatin (CRESTOR) tablet 20 mg  20 mg Oral Daily Kevin Aguila PA-C   20 mg at 08/06/24 0754    senna-docusate (SENOKOT-S/PERICOLACE) 8.6-50 MG per tablet 1 tablet  1 tablet Oral or Feeding Tube BID PRN Tony Lakhani MD        Or    senna-docusate (SENOKOT-S/PERICOLACE) 8.6-50 MG per tablet 2 tablet  2 tablet Oral or Feeding Tube BID PRN Tony Lakhani MD        senna-docusate (SENOKOT-S/PERICOLACE) 8.6-50 MG per tablet 1 tablet  1 tablet Oral or Feeding Tube BID Blayne Hammond MD   1 tablet at 08/01/24 2052    sodium chloride (OCEAN) 0.65 % nasal spray 1 spray  1 spray Both Nostrils Q1H PRN Soledad Quesada APRN CNP   1 spray at 08/04/24 2135    sodium chloride (PF) 0.9% PF flush 3 mL  3 mL Intracatheter Q8H Blayne Hammond MD   3 mL at 08/06/24 0216    sodium chloride (PF) 0.9% PF flush 3 mL  3 mL Intracatheter q1 min prn Blayne Hammond MD        [Held by provider] sotalol (BETAPACE) half-tab 40 mg  40 mg Oral Q12H [facility] (08/20) Nova Todd APRN CNP        tamsulosin (FLOMAX) capsule 0.4 mg  0.4 mg Oral Daily Kevin Aguila PA-C   0.4 mg at 08/06/24 0754    traZODone  "(DESYREL) half-tab 25 mg  25 mg Oral At Bedtime PRN Emy Garcia APRN CNP   25 mg at 08/04/24 2113    Or    traZODone (DESYREL) tablet 50 mg  50 mg Oral At Bedtime PRN Emy Garcia, LESLIE MARIANO         Family History:   Family History   Problem Relation Age of Onset    Anesthesia Reaction No family hx of     Deep Vein Thrombosis (DVT) No family hx of      Social History:   Social History     Tobacco Use    Smoking status: Never    Smokeless tobacco: Never    Tobacco comments:     Occasional cigar 20+ years ago   Substance Use Topics    Alcohol use: Yes     Comment: 1 per month       ROS:   A comprehensive 10 point ROS was negative other than as mentioned in HPI.    Physical Examination:   VITALS: /67 (BP Location: Left arm, Cuff Size: Adult Large)   Pulse 102   Temp 97.7  F (36.5  C) (Oral)   Resp 18   Ht 1.74 m (5' 8.5\")   Wt (!) 151.8 kg (334 lb 10.5 oz)   SpO2 98%   BMI 50.14 kg/m    GENERAL APPEARANCE: AxO, NAD   HEENT: NCAT, EOMI, MMM. PERRLA.   NECK: Supple.   CHEST: CTAB   CARDIOVASCULAR: Irregularly irregular.   ABDOMEN: BS+, soft, NT, ND.  EXTREMITIES: 1+ BLE edema. Distal pulses intact.   NEURO: Grossly nonfocal.   PSYCH: Normal affect.  SKIN: Warm and dry.   Data:   Labs:  [facility]  Recent Labs   Lab 08/06/24  0747 08/06/24  0006 08/05/24  2200 08/05/24  1611 08/05/24  0757 08/05/24  0629 08/04/24  1832 08/04/24  1549 08/04/24  0840 08/04/24  0451 08/03/24  0904 08/03/24  0543   NA  --  134*  --   --   --  131*  --   --   --  132*  --  130*   POTASSIUM  --  4.2  --   --   --  4.2  --  4.7  --  4.1  --  5.0   CHLORIDE  --  100  --   --   --  98  --   --   --  99  --  97*   VISH  --  8.5*  --   --   --  8.5*  --   --   --  8.4*  --  8.6*   CO2  --  26  --   --   --  23  --   --   --  24  --  20*   BUN  --  42.8*  --   --   --  45.4*  --   --   --  47.6*  --  54.7*   CR  --  1.36*  --   --   --  1.32*  --   --   --  1.31*  --  1.50*   * 163* 178* 199*   < > 171*   < >  " --    < > 158*   < > 223*    < > = values in this interval not displayed.     CBC  Recent Labs   Lab 24  0535 24  0629 24  0451 24  0543   WBC 5.5 5.6 5.4 7.0   RBC 2.86* 2.96* 2.96* 3.01*   HGB 8.5* 8.8* 8.9* 8.9*   HCT 27.4* 28.1* 28.2* 28.9*   MCV 96 95 95 96   MCH 29.7 29.7 30.1 29.6   MCHC 31.0* 31.3* 31.6 30.8*   RDW 18.7* 19.3* 19.7* 19.4*    158 130* 139*     INR  Recent Labs   Lab 24  0535 24  0629 24  04524  0543   INR 1.25* 1.11 1.14 1.26*       EK/22/24 Coronary Angiogram (OSH Report):        24 Isamar Stress PET (OSH Report):    ECHO: (OSH Report)      Assessment:   Mr. Hutchinson is a 74 year old male who has a medical history significant for CAD s/p PCI LCx , SVT, PAT/AFL (CHADSVASC 5), HTN, HLD, HERRERA (uses CPAP), CKD, DM2, OA, prostate CA s/p radiation c/b proctitis, GIB, and obesity.   He had a prior history of CAD and had prior PCI to LCx in . He also has a history of AT/AFL and had been on Sotalol and Diltiazem as well as Eliquis for stroke prophylaxis. He also occasionally take PRN short acting diltiazem with palpitations. He then developed significant GALLARDO and had a positive stress test. Subsequent coronary angiogram showed 70% occulsion of the proximal LAD, 60% distal LAD, 30% proximal LCx, 30% distal LCx, 90% pRCA. He has been on Plavix and Eliquis but was having ongoing bleeding issues 2/2 prior radiation therapy/proctitis. Eliquis was stopped 24 which did help, but even on Plavix only he had some bleeding. Thus, he was referred for consideration for CABG instead of percutaneous revascularization given concern for inability to tolerate needed DAPT. He underwent CABG X2 (LIMA-LAD, SVG-LPL) with concomitant surgical PVI RFA and STACY ligation on 24. He was extubated POD0 and uses CPAP at night. Post operatively, he was noted to have some junctional rhythm ~50 bpm and was using temporary epicardial pacing.  Pre-operatively his baseline ECG showed SR with 1 AVB and RBBB. His intrinsic rhythm recovered. An OSH recent echo showed LVEF 60-65% and Grade 1 diastolic dysfunction. He went into AF with RVR on 8/3/24 around 0500 and has remained in AF. His diltiazem was increased. VSS. SCr 11.36, GFR 54, , WBC 5.5 Hgb 8.5. Current cardiac medications: ASA, Diltiazem, and Crestor.   EP Recommendations:  Paroxysmal Atrial Fibrillation/Flutter:   1. Stroke Prophylaxis: CHADSVASC ++age, +CAD, +HTN, +DM 5, corresponding to a 6.7% annual stroke / systemic embolism event rate. He did have STACY ligation, but would need anticoagulation short term to allow for cardioversion (as below). He has had bleeding when on Eliquis and Plavix resulting in Eliquis being stopped in 5/2024. Bleeding improved on Plavix and ASA but still was occurring. Would want to try Eliquis alone with out ASA or Plavix to limit bleeding risk as much as possible.   2. Rate Control: Continue Diltiazem.   3. Rhythm Control: Cardioversion, Antiarrhythmics and/or ablation are options for rhythm control. Had been on PTA Sotalol at 80 mg daily for reported AT/AFL. Now with post operative AF. Had surgical RFA PVI. Would recommend doing RAPHAEL/DCCV when able to be on anticoagulation. Can do amiodarone IV bolus and gtt followed by  mg BID X2 weeks then 200 mg daily thereafter. Chronicity can be addressed as an outpatient or if wanting to change back to Sotalol once renal function is stable etc as an outpatient.   4. Risk Factor Management: Statin, BP control, and HERRERA evaluation as indicated.     The patient states understanding and is agreeable with plan.   Thank you for allowing us to participate in the care of this patient.     The patient was discussed w/ Dr. Ha.  The above note reflects our joint plan.    LESLIE Whitehead CNP  Electrophysiology Consult Service  Securely message with [employer]   Text page via [id number]     POLO Total time spent on  patient visit, reviewing notes, imaging, labs, orders, and completing necessary documentation: 70 minutes.  >50% of visit spent on counseling patient and/or coordination of care.

## 2024-08-06 NOTE — PROGRESS NOTES
Ambulating in halls, up to chair. Denying pain. Amio bolus, gtt initiated per orders. Continues to be in Afib, MAP > 65 w/o intervention. 60mg lasix given w/ good response.     Plan: Transfer to floor  For vital signs and complete assessments, please see documentation flowsheets.

## 2024-08-06 NOTE — PROGRESS NOTES
Cardiovascular Surgery Progress Note  08/06/2024         Assessment and Plan:     Jorje Hutchinson is a 74 year old male with a PMH of CAD sp coronary angioplasty w/stent placement of circumflex coronary artery in 2002, HTN, HLD, SVT, chronic afib (on apixaban), HERRERA on home CPAP, CKD, T2DM, OA, prostate cancer, and worsening morbid obesity. He was referred to Dr. Wilson in the outpatient setting after a coronary angiogram showed multi-vessel disease. Patient is now s/p planned CABG x2 and PVI and LAAL with sternal plating on 7/30/24 with Dr. Deniz Wilson.    Cardiovascular:   Hx of CAD - s/p CABG x2 7/30/24  Hx of chronic afib s/p bilateral PVI with radiofrequency ablation with Atricure device (modified MAZE procedure) and left atrial appendage exclusion 7/30/24  S/p Sternal closure with sternal plates   Hx of coronary angioplasty with stent to circumflex in 2002  HTN  HLD  SVT  Rate controlled AF overnight, HD stable. MAPS 70-80s  Most recent echo showed LV EF 60-65%  -  mg daily  - PTA rosuvastatin 20 mg daily   - low dose metoprolol tartrate started 8/7  - PTA PTA dose 120 mg daily- not resumed, short acting dilt 30 mg Q8h  - EP consulted 8/6 for afib management   - PTA meds on hold: Plavix, losartan, sotalol, spironolactone, Eliquis   - Diuresis as below    Chest tubes and TPW removed in the ICU    Pulmonary:  HERRERA with home CPAP  Extubated POD 0. Now saturating well on RA  - Supplemental O2 PRN to keep sats > 92%. Wean off as tolerated.  - Pulm hygiene, IS, activity and deep breathing  - CXR demonstrating improvement of right opacities/effusion on 8/5  - continue home CPAP at night     Neurology / Psych:  Acute post-operative pain   Pain well controlled with current regimen:  - Acetaminophen PRN, robaxin PRN  Insomnia  - Melatonin 5 mg HS  - Trazodone PRN     / Renal / Fluid / Electrolytes:  ULISES on CKD     BL creat ~ 1.42, most recent creatinine 1.36; UOP unmeasured voids  FLUID STATUS: Pre-op weight  333 lb, weight today 223; I/O past 24 hours: inaccurate  - Diuresis resume PTA torsemide 20 mg daily  - Replete lytes per protocol  - Strict I/O, daily weights  - Avoid/limit nephrotoxins as able  - Restarted PTA tamsulosin 0.4 mg (on 8/1/2024) and Oxybutynin ER 10 mg every day (on 8/3/2024)     GI / Nutrition:   Concern for protein-calorie malnutrition  Constipation - resolved   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 Hemoglobin dropped this morning. GI consult placed. Sucralfate enema ordered per GI note pre-op  - Tolerating regular diet  - Continue bowel regimen, last BM 8/7  - metamucil added 8/5 for diarrhea     Endocrine:  Stress induced hyperglycemia   T2DM, insulin dependent   Hgb A1c 6.7  - Initially managed on insulin drip postop, transitioned to high resistance sliding scale  - goal BG <180 for optimal healing  - PTA Jardiance resumed 8/7  - insulin glargine 10 mg AM (PTA dose 38 units every morning)    Infectious Disease:  Stress induced leukocytosis  WBC 5.3, remains afebrile, no signs or symptoms of infection  - Completed perioperative antibiotics  - Continue to monitor fever curve, CBC    Hematology:   Acute blood loss anemia and thrombocytopenia  Hgb 7.7(8.5); Plt 153  - s/p transfusion with 1 unit pRBCs on 8/1 with appropriate response   - CBC daily    Anticoagulation:   -  mg only   - no plan for AC or PTA plavix per Dr. Steve ZURITA/Skin:  Sternotomy  Surgical incision  Chronic left knee pain  - Sternal precautions  - Incisional cares per protocol  - PTA allopurinol 100 mg BID on hold     Prophylaxis:   - Stress ulcer prophylaxis: Pantoprazole 40 mg daily for 30 days  - DVT prophylaxis: Subcutaneous heparin, SCD    Disposition:   - Transferred to  on 8/6  - Therapies recommending discharge to home when ready  -  Medically Ready for Discharge: Anticipated in 2-4 Days    Discussed with Dr. Steve Joaquin PA-C  "  Cardiothoracic Surgery   Pager #[phone]  August 7, 2024 at 8:11 AM      Clinically Significant Risk Factors              # Hypoalbuminemia: Lowest albumin = 2.7 g/dL at 8/3/2024  5:43 AM, will monitor as appropriate  # Coagulation Defect: INR = 1.25 (Ref range: 0.85 - 1.15) and/or PTT = 32 Seconds (Ref range: 22 - 38 Seconds), will monitor for bleeding              # DMII: A1C = 6.7 % (Ref range: <5.7 %) within past 6 months   # Severe Obesity: Estimated body mass index is 50.14 kg/m  as calculated from the following:    Height as of this encounter: 1.74 m (5' 8.5\").    Weight as of this encounter: 151.8 kg (334 lb 10.5 oz).      # Financial/Environmental Concerns: none   # History of CABG: noted on surgical history           Interval History:     No overnight events.  States pain is well managed on current regimen. Slept well overnight.  Tolerating diet, is passing flatus, + BM. Significant bleeding from rectum this morning per patient and nurse. Hemoglobin dropped this morning. GI consult placed. Sucralfate enema ordered per GI note pre-op  No nausea or vomiting.  Breathing well without complaints.   Working with therapies and ambulating in halls with assistance.   Denies chest pain, palpitations, dizziness, syncopal symptoms, fevers, chills, myalgias, or sternal popping/clicking.         Physical Exam:     /66 (BP Location: Left arm, Cuff Size: Adult Large)   Pulse 95   Temp 97.9  F (36.6  C)   Resp 16   Ht 1.74 m (5' 8.5\")   Wt (!) 151.8 kg (334 lb 10.5 oz)   SpO2 96%   BMI 50.14 kg/m      Gen: A&Ox4, NAD  Neuro: no focal deficits   CV: rate controlled AF, no murmurs, rubs or gallops  Pulm: CTA, no wheezing or rhonchi, normal breathing on RA  Abd: nondistended, normal BS, soft, nontender  Ext: mild peripheral edema  Incision: clean, dry, intact, no erythema, sternum stable  Tubes/drain sites: dressing clean and dry         Data:    Imaging:  reviewed recent imaging, no acute " concerns    Recent Results (from the past 24 hour(s))   XR Chest Port 1 View    Narrative    Exam: XR CHEST PORT 1 VIEW, 8/5/2024 5:05 PM    Indication: Post pull cxr    Comparison: Chest radiograph from earlier the same day    Findings:   Post surgical changes the chest with intact sternotomy wires and  sternal plates. Interval removal of mediastinal drains and left  basilar chest tube. Left atrial appendage clip present. Stable  cardiomediastinal silhouette. Small right pleural effusion, unchanged.  No pneumothorax. Continued bibasilar opacities, unchanged.      Impression    Impression:   1. No pneumothorax post chest tube removal.  2. Small right pleural effusion.  3. Mild bibasilar opacities, likely atelectasis.    I have personally reviewed the examination and initial interpretation  and I agree with the findings.    BECKY ESCAMILLA DO         SYSTEM ID:  [id number]        Labs:  Recent Labs   Lab 08/06/24  1246 08/06/24  0747 08/06/24  0535 08/06/24  0006 08/05/24  0757 08/05/24  0629 08/04/24  1832 08/04/24  1549 08/04/24  0840 08/04/24  0451   WBC  --   --  5.5  --   --  5.6  --   --   --  5.4   HGB  --   --  8.5*  --   --  8.8*  --   --   --  8.9*   MCV  --   --  96  --   --  95  --   --   --  95   PLT  --   --  153  --   --  158  --   --   --  130*   INR  --   --  1.25*  --   --  1.11  --   --   --  1.14   NA  --   --   --  134*  --  131*  --   --   --  132*   POTASSIUM  --   --   --  4.2  --  4.2  --  4.7  --  4.1   CHLORIDE  --   --   --  100  --  98  --   --   --  99   CO2  --   --   --  26  --  23  --   --   --  24   BUN  --   --   --  42.8*  --  45.4*  --   --   --  47.6*   CR  --   --   --  1.36*  --  1.32*  --   --   --  1.31*   ANIONGAP  --   --   --  8  --  10  --   --   --  9   VISH  --   --   --  8.5*  --  8.5*  --   --   --  8.4*   * 144*  --  163*   < > 171*   < >  --    < > 158*   ALBUMIN  --   --   --  3.0*  --  3.0*  --   --   --  3.0*   PROTTOTAL  --   --   --  5.3*  --  5.6*  --   --    --  5.3*   BILITOTAL  --   --   --  0.4  --  0.4  --   --   --  0.5   ALKPHOS  --   --   --  85  --  90  --   --   --  82   ALT  --   --   --  5  --  6  --   --   --  <5   AST  --   --   --  15  --  20  --   --   --  16    < > = values in this interval not displayed.      GLUCOSE:   Recent Labs   Lab 08/06/24  1246 08/06/24  0747 08/06/24  0006 08/05/24  2200 08/05/24  1611 08/05/24  1218   * 144* 163* 178* 199* 190*

## 2024-08-06 NOTE — PROGRESS NOTES
CV ICU PROGRESS NOTE    Jorje Hutchinson  [id number]  Admitted: 7/30/2024  5:30 AM      ASSESSMENT: Jorje Hutchinson is a 74 year old male with PMH of CAD sp coronary angioplasty w/stent placement of circumflex coronary artery in 2002, HTN, HLD, SVT, chronic afib (on dilat & apixaban), HERRERA on home CPAP, CKD, T2DM, OA, prostate cancer, and worsening morbid obesity. He presents to [facility] for CORONARY ARTERY BYPASS GRAFT x2 (LIMA-LAD, SVG-LPL), Pulmonary vein isolation with RF ablation, LIGATION, LEFT ATRIAL APPENDAGE, sternal closure with plates and wires.     OVERNIGHT EVENTS  - No overnight events  - UOP not strictly monitored 1202-0052  - Follow-up CXR post-CT removal: no pneumo, stable small right pleural effusion    TODAY'S PROGRESS:  - Floor status  - Diuresis goal: net -1L  - No long term AC or PTA plavix per Dr. Wilson  - Diltiazem increased to PTA dose (180 every day), afib persistent 90s-100s. EP consulted    CO-MORBIDITIES:  Patient Active Problem List   Diagnosis    Diabetes mellitus, type 2 (H)    Coronary artery disease involving native coronary artery of native heart, unspecified whether angina present     PLAN:  Neuro/ pain/ sedation:  #Acute Postoperative pain, controlled  - Monitor neurological status. Notify the MD for any acute changes in exam.  - Pain: Tylenol, robaxin PRN    # Insomnia  - Melatonin 5 mg HS  - Trazodone PRN     Pulmonary care:   #Obstructive Sleep Apnea, CPAP  #Postoperative ventilation management, resolved  - Extubated on 7/30, tolerated well  - Titrate supplemental oxygen to maintain saturation above 92%.  - Pulmonary hygiene: Incentive spirometer every 15-30 minutes when awake, flutter valve, C&DB  - Home CPAP at night--intermittently using home vs AVAPS for best results   - Encouraged to increase IS use, OOB to chair, Cough and deep breathe   - CXR demonstrating improvement of right opacities/effusion on 8/5/2024    Cardiovascular:  #S/p CABG x2 (LIMA-LAD, SVG-LPL), Pulmonary  vein isolation with RF ablation, LIGATION, LEFT ATRIAL APPENDAGE, sternal closure with plates and wires on 7/30/24 by Dr. Wilson  #History of CAD sp coronary angioplasty w/stent of circumflex coronary a in 2002  #HTN  #HLD  #Supraventricular tachycardia  #Post-op vasoplegia  #Hx of A fib (PTA diltiazem, Apixaban)   Recent echo on 6/20/24 with LVEF of 60-65%  - Monitor hemodynamic status. Currently no pressors   - Goal MAP>65, SBP<130  - Restarted PTA rosuvastatin 20, asa 162 mg (PTA 81 mg)  - Pacing wires capped 8/4/2024, removed  - PTA diltiazem ER increased to 180 mg every day   - EP consult for intrinsic recovery, antiarrhythmic medication selection  - Per Dr. 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  - Diet: Combo of moderate consistent CHO diet and low saturated fat sodium less than 2400 mg  - Discontinued protonix as not PTA med  - Continue bowel regimen: miralax, senna, PRN milk of mag  - Added PTA metamucil on 8/5/2024 for diarrhea    Renal/ Fluid Balance/ Electrolytes:   #CKD w/o dialysis  #Acute Kidney Injury - Improving  # Hyponatremia - Improving  BL creat appears to be ~ 1.42  - Strict I/O, daily weights  - Avoid/limit nephrotoxins as able  - Replete lytes PRN per protocol  - Restarted PTA tamsulosin 0.4 mg (on 8/1/2024) and Oxybutynin ER 10 mg every day (on 8/3/2024)  - Daily BMP, hepatic panel, phos, mag  - Monitoring hyponatremia with serial BMP  - Diuresis goal - 1L: Lasix 60 mg IV x1 on 8/6/2024, weight-based progress assessment    Endocrine:  #Stress-induced hyperglycemia  #T2DM, insulin dependent  Preop A1c 6.7% on 7/1/24  - Insulin gtt discontinued  - Goal BG <180 for  optimal healing  - PTA meds: Empagliflozin 10 mg every day, insulin glargine 38u subcutaneous AM to be held  - Sliding scale insulin and glargine; consider restarting PTA Empagliflozin    ID/ Antibiotics:  #Stress induced leukocytosis, improved   - Completed perioperative regimen  - Continue to monitor fever curve, WBC and inflammatory markers as appropriate    Heme:  #Stress induced leukocytosis  #Acute blood loss anemia, improving  #Acute blood loss thrombocytopenia  No s/sx active bleeding  - Continue to monitor  - Daily CBC, Coag panel  - Heparin subQ 5000 TID  for PPX   - Transfused 1 U PRBCs on 8/1/2024 with noted improvement in Hgb on 8/2/2024  - Goal Hgb > 7     MSK/ Skin:  #Sternotomy  #Surgical Incision  #Chronic left knee pain  - Sternal precautions  - Postoperative incision management per protocol  - PT/OT/CR - SW/RNCC following for transition of care  - PTA allopurinol 100 mg bid held    Prophylaxis:  - Mechanical prophylaxis for DVT  - Chemical DVT prophylaxis - subcutaneous heparin    Lines/ tubes/ drains:  - PIV    Disposition:  - Floor status    ICU Checklist:  F - Feeding: in place  A - Analgesia: controlled   S - Sedation: N/A   T - Thromboembolic prophylaxis: SQH     H - Head of bed elevated - yes & OOB to chair   U - Ulcer prophylaxis: N/A  G - Glycemic control: established  S - SBT: N/A     B - Bowel regimen: established  I - Indwelling catheter: removed  D - De-escalation of antibiotics: completed     Patient seen, findings and plan discussed with CVICU staff, Dr Bundy.  Critical care time exclusive of procedures:  min     Maryjane San, MS4        ====================================  SUBJECTIVE  In recliner. Slept well last night, only needed melatonin, used AVPS. Did not have a bowel movement yesterday, states this is normal after he has had a bad day with diarrhea/clots, no abdominal pain. Was able to make 2 laps around the unit yesterday, no chest pain or SOB. Appetite is good, not in  any pain. Does not feel like he is in atrial fibrillation right now. No other concerns. Would like to go home tomorrow. Worked with SW yesterday to discuss discharge to TCU, patient and wife are in discussion.     OBJECTIVE  1. VITAL SIGNS  Temp:  [97.4  F (36.3  C)-98.7  F (37.1  C)] 97.7  F (36.5  C)  Pulse:  [] 94  Resp:  [16-20] 16  BP: (106-137)/(56-66) 106/56  FiO2 (%):  [35 %] 35 %  SpO2:  [94 %-100 %] 94 %  FiO2 (%): 35 %  Resp: 16    2. INTAKE/ OUTPUT  I/O last 3 completed shifts:  In: 500 [P.O.:500]  Out: 1115 [Urine:975; Chest Tube:140]    3. PHYSICAL EXAMINATION  GEN:  no acute distress, sitting in chair   NEURO:  no focal deficits, moving extremities spontaneously, alert, oriented  CV:  heart rate irregularly irregular  PULM:  non-labored breathing, no stridor or wheezes, lungs CTA bilaterally  MSK:  extremities warm and well perfused, +1 pitting edema bilateral lower extremities  SKIN:  no rash, sternotomy site c/d/I    4. INVESTIGATIONS  Arterial Blood Gases   Recent Labs   Lab 07/31/24  0259 07/30/24  2308 07/30/24  2143 07/30/24  1955   PH 7.34* 7.34* 7.29* 7.34*   PCO2 44 44 51* 45   PO2 129* 140* 133* 145*   HCO3 24 24 25 24     Complete Blood Count   Recent Labs   Lab 08/06/24  0535 08/05/24  0629 08/04/24  0451 08/03/24  0543   WBC 5.5 5.6 5.4 7.0   HGB 8.5* 8.8* 8.9* 8.9*    158 130* 139*     Basic Metabolic Panel  Recent Labs   Lab 08/06/24  0006 08/05/24  2200 08/05/24  1611 08/05/24  1218 08/05/24  0757 08/05/24  0629 08/04/24  1832 08/04/24  1549 08/04/24  0840 08/04/24  0451 08/03/24  0904 08/03/24  0543   *  --   --   --   --  131*  --   --   --  132*  --  130*   POTASSIUM 4.2  --   --   --   --  4.2  --  4.7  --  4.1  --  5.0   CHLORIDE 100  --   --   --   --  98  --   --   --  99  --  97*   CO2 26  --   --   --   --  23  --   --   --  24  --  20*   BUN 42.8*  --   --   --   --  45.4*  --   --   --  47.6*  --  54.7*   CR 1.36*  --   --   --   --  1.32*  --   --   --   "1.31*  --  1.50*   * 178* 199* 190*   < > 171*   < >  --    < > 158*   < > 223*    < > = values in this interval not displayed.     Liver Function Tests  Recent Labs   Lab 08/06/24  0006 08/05/24  0629 08/04/24  0451 08/03/24  0543 08/02/24  0406   AST 15 20 16 19 22   ALT 5 6 <5 <5 <5   ALKPHOS 85 90 82 86 92   BILITOTAL 0.4 0.4 0.5 0.5 0.5   ALBUMIN 3.0* 3.0* 3.0* 2.7* 3.0*   INR  --  1.11 1.14 1.26* 1.34*     Pancreatic Enzymes  No lab results found in last 7 days.  Coagulation Profile  Recent Labs   Lab 08/05/24  0629 08/04/24  0451 08/03/24  0543 08/02/24  0406   INR 1.11 1.14 1.26* 1.34*   PTT 29 29 33 35     Lactate  Invalid input(s): \"LACTATE\"    5. RADIOLOGY  Recent Results (from the past 24 hour(s))   RAPHAEL with Report    Narrative    Tyrone Barrientos DO     7/30/2024  3:17 PM  Perioperative RAPHAEL Procedure Note    Staff -        Resident/Fellow: Tyrone Barrientos DO       Performed By: fellow  Preanesthesia Checklist:  Patient identified, IV assessed, risks and   benefits discussed, monitors and equipment assessed, procedure being   performed at surgeon's request and anesthesia consent obtained.    RAPHAEL Probe Insertion    Probe Status PRE Insertion: NO obvious damage  Probe type:  Adult 3D  Bite block used:   Oral Airway  Insertion Technique: Jaw Lift  Insertion complications: None obvious  Billing Report:RAPHAEL report by Anesthesiologist (See Separate Report note)  Probe Status POST Removal: NO obvious damage    RAPHAEL Report  General Procedure Information  Images for this study have been archived.  Modalities: Color flow mapping, PW Doppler, CW Doppler, 3D and 2D  Diagnostic indications for RAPHAEL:   Cardiomyopathy, other  Echocardiographic and Doppler Measurements  Right Ventricle:  Cavity size normal.    Hypertrophy not present.     Thrombus not present.    Global function normal.     Left Ventricle:  Cavity size normal.    Hypertrophy not present.     Thrombus not present.   Global Function normal.   "     Ventricular Regional Function:  1- Basal Anteroseptal:  normal  2- Basal Anterior:  normal  3- Basal Anterolateral:  normal  4- Basal Inferolateral:  normal  5- Basal Inferior:  normal  6- Basal Inferoseptal:  normal  7- Mid Anteroseptal:  normal  8- Mid Anterior:  normal  9- Mid Anterolateral:  normal  10- Mid Inferolateral:  normal  11- Mid Inferior:  normal  12- Mid Inferoseptal:  normal  13- Apical Anterior:  normal  14- Apical Lateral:  normal  15- Apical Inferior:  normal  16- Apical Septal:  normal  17- [place]:  normal    Valves  Aortic Valve: Annulus normal.  Stenosis not present.  Regurgitation   absent.  Leaflets normal.  Leaflet motions normal.    Mitral Valve: Annulus normal.  Stenosis not present.  Regurgitation   absent.  Leaflets normal.  Leaflet motions normal.    Tricuspid Valve: Annulus normal.  Stenosis not present.  Regurgitation   absent.  Leaflets normal.  Leaflet motions normal.        Aorta: Ascending Aorta: Size normal.  Dissection not present.  Plaque   thickness less than 3 mm.  Mobile plaque not present.    Aortic Arch: Size normal.    Dissection not present.   Plaque thickness   less than 3 mm.   Mobile plaque not present.    Descending Aorta: Size normal.    Dissection not present.   Plaque   thickness less than 3 mm.   Mobile plaque not present.      Right Atrium:  Size normal.   Spontaneous echo contrast not present.     Thrombus not present.   Tumor not present.   Device not present.     Left Atrium: Size normal.  Spontaneous echo contrast not present.    Thrombus not present.  Tumor not present.  Device not present.    Left atrial appendage normal.     Atrial Septum: Intra-atrial septal morphology normal.       Ventricular Septum: Intra-ventricular septum morphology normal.        Other Findings:   Pericardium:  normal. Pleural Effusion:  none. Pulmonary Arteries:    normal. Pulmonary Venous Flow:  normal. Cornoary sinus catheter present.    Post Intervention  Findings  Procedure(s) performed:  CABG. Global function:  Unchanged. Regional wall   motion: Unchanged. Surgeon(s) notified of all postintervention findings:   Yes.                 Echocardiogram Comments  Echocardiogram comments:   Pre-CPB:  Grossly normal LV size with preserved systolic function and EF 62% by   biplane assessment. Normal LV diastolic function by E/A 1.4 and lateral e'   10.8 cm/s. Grossly normal RV size with no evidence of systolic dysfunction   by TAPSE 25 mm. STACY velocities > 4 cm/s with no visualization of SEC or   thrombus via 3D assessment. No PFO by CFD. Trace MR. Trileaflet aortic   valve without significant regurgitation or stenosis. No pleural or   pericardial effusion. No echo evidence of aortic dissection. Findings   communicated with surgical team in real time.    Post-CPB:  Globally unchanged biventricular size and function. No new RWMA. MR   trace-mild by visual appearance and 2D PISA EROA 0.06 cm^2. No significant   pleural effusion. No echo evidence of aortic dissection. Findings   communicated with surgical team in real time..   XR Chest Port 1 View    Narrative    Portable chest    INDICATION: Endotracheal tube positioning    COMPARISON: CT chest 7/1/2024    FINDINGS: Low inspiratory volumes. Left basilar thoracostomy tube. No  pneumothorax. Left atrial appendage ligation clip. Sternal fixation.  Endotracheal tube in the mid thoracic trachea. Mediastinal drain  tubing. Right IJ sheath tip in the low right internal jugular vein.  Bandlike densities in the left lung suggest mild edema or subsegmental  atelectasis.    Impression    IMPRESSION: Endotracheal tube presumably in the mid thoracic trachea.  Mild edema/atelectasis especially in the left lung.    ARMIDA GONZALEZ MD         SYSTEM ID:  [id number]   XR Chest Port 1 View    Impression    RESIDENT PRELIMINARY INTERPRETATION  Impression:   1. Interval removal of endotracheal tube. Otherwise stable support  devices.  2.  Continued left greater than right perihilar predominant opacities,  likely mild pulmonary edema and atelectasis.

## 2024-08-06 NOTE — DISCHARGE INSTRUCTIONS
AFTER YOU GO HOME FROM YOUR HEART SURGERY  PROCEDURES 7/30/24 surgeon Dr. Deniz Wilson  1.  Coronary bypass grafting x 2 (left internal mammary artery to left anterior descending artery, saphenous vein graft to left posterolateral artery).  2.  Endoscopic vein harvest.  3. Bilateral pulmonary vein isolation with radiofrequency ablation with Atricure device (modified maze procedure)  4. Left atrial appendage exclusion with 40 mm Atriclip  5. Sternal closure with Kenroy sternal plates    You had a sternotomy, avoid lifting anything greater than ten pounds for 8 weeks after surgery and then less than 20 pounds for an additional 4 weeks.   Do not reach backwards or use arms to push out of chair.   Do not let people pull on your arms to assist with standing.   Avoid twisting or reaching too far across your body.    Avoid strenuous activities such as bowling, vacuuming, raking, shoveling, golf or tennis for 12 weeks after your surgery.   It is okay to resume sex if you feel comfortable in doing so. You may have to try different positions with your partner.    Splint your chest incision by hugging a pillow or bringing your arms across your chest when coughing or sneezing.     No driving for 4 weeks after surgery or while on pain medication.    Shower or wash your incisions daily with soap and water (or as instructed), pat dry.   Keep wound clean and dry, showers are okay after discharge, but don't let spray hit directly on incision.   No baths or swimming for 1 month.   Cover chest tube sites with dry gauze until they stop draining, then leave open to air. It is not abnormal for chest tube sites to drain yellowish/clear fluid for up to 2-3 weeks after surgery.   Watch for signs of infection: increased redness, tenderness, warmth or any drainage that appears infected (pus like) or is persistent.  Also a temperature > 100.5 F or chills. Call your surgeon or primary care provider's office immediately.   Remove any skin  glue left on incisions after 10-14 days. This will not affect your incision and can speed up healing.    Exercise is very important in your recovery. Please follow the guidelines set up for you in your cardiac rehab classes at the hospital. If outpatient cardiac rehab was ordered for you, we highly recommend you participate. If you have problems arranging your cardiac rehab, please call [phone] for all locations, with the exception of [place], please call [phone] and [facility], please call [phone].    Avoid sitting for prolonged periods of time, try to walk every hour during the day. If you have a leg incision, elevate your leg often when you are not walking.    Check your weight when you get home from the hospital and continue to check it daily through your recovery for at least a month. If you notice a weight gain of 2-3 pounds in a week, notify your primary care physician, cardiologist or surgeon.    Bowel activity may be slow after surgery. If necessary, you may take an over the counter laxative such as Milk of Magnesia or Miralax. You may have stool softeners prescribed (docusate sodium, Senokot). We recommend using stool softeners while using narcotics for pain (oxycodone/percocet, hydrocodone/vicodin, hydromorphone/dilaudid).        DO NOT SMOKE.  IF YOU NEED HELP QUITTING, PLEASE TALK WITH YOUR CARDIOLOGIST OR PRIMARY DOCTOR.    REGARDING PRESCRIPTION REFILLS.  If you need a refill on your pain medication contact us to discuss your pain and a possible one time refill.   All other medications will be adjusted, discontinued and re-filled by your primary care physician and/or your cardiologist as they were prior to your surgery. We have given you enough for one to three month with possibly one refill.    POST-OPERATIVE CLINIC VISITS  You will have a follow up visit with [facility] Surgery Advance Care Practitioners at the [facility].  You will then return to the care of your  primary provider and your cardiologist. Future medication refills should come from your PCP or Cardiologist.   You should see your primary care provider in 2-4 weeks after discharge.   It is important to see your cardiologist about 1 weeks after discharge.   ____________________________________________________________________________________  Needs follow up in 1 week with cardiologist: Dr Mendel Parikh [place], [place]  Ph: [phone]  Fax: [phone]  [facility] [place]      [address] [place], [place] [address]  [phone]  Fax: [phone]    Dr Parikh is on vacation for 2 weeks so she scheduled pt with:  Alycia SALGADO on: Wednesday, 8/21/24  Check In: 2:40pm  Alycia will then set pt up with a Dr Parikh appointment and she can order OP CR for when HHPT/OT is done.       If you do not hear from a  in 7 days, please call [phone] (choose option 1) and request to be seen with a general cardiologist or someone that you have seen in the past.   If there is a need to return to see CT Surgery please call our  at [phone].  ____________________________________________________________________________________  SURGICAL QUESTIONS  Please call Orlando Altamirano, Jenna Maher, Kimberly Rivers or Destiny Loyola with surgical recovery and medication questions, their phone numbers are listed below.  They will assist you with your needs and contact other surgery care team members as indicated.    On weekends or after hours, please call [phone] and ask the  to page the Cardiothoracic Surgery fellow on call.      Thank you,    Your Cardiothoracic Surgery Team   Orlando Altamirano, RN Care Coordinator - [phone]  Jenna Maher, RN Care Coordinator - [phone]   Destiny Loyola, RN Care Coordinator - [phone]   Kimberly Rivers, RN Care Coordinator - [phone]     Discharge Resources  [place], [place]  Ph: [phone] Fax:  [phone] per Tayla-will be private pay:  Bariatric shower chair without arms $60  Bariatric commode with arms $80    Please request a referral from your Primary Care Provider for Home Health Care Referral to be sent to [facility] and [facility].

## 2024-08-06 NOTE — PLAN OF CARE
ICU End of Shift Summary. See flowsheets for vital signs and detailed assessment.    Changes this shift:   Neuro: A/O x4 LAO, denies pain. Afebrile. Ax1-2 w/ walker    CV:   Afib rate 90-120s.  Sys goal <140, MAP > 65  No hypotension with activity    Pulm: RA while awake. AVAPS overnight (pt has home cpap but prefers avaps). Clear LS. Endorses mild SOB with activity.     GI: diabetic/cardiac diet, appetite improving. + flatus, soft/loose BM x1 with maroon blood clots    : Voiding spontaneously, urgency incontinence.    Plan: Monitor hemodynamic status, monitor electrolytes and replace per protocol. Continue POC.

## 2024-08-07 ENCOUNTER — APPOINTMENT (OUTPATIENT)
Dept: OCCUPATIONAL THERAPY | Facility: CLINIC | Age: 74
DRG: 233 | End: 2024-08-07
Attending: SURGERY
Payer: MEDICARE

## 2024-08-07 ENCOUNTER — APPOINTMENT (OUTPATIENT)
Dept: GENERAL RADIOLOGY | Facility: CLINIC | Age: 74
DRG: 233 | End: 2024-08-07
Attending: SURGERY
Payer: MEDICARE

## 2024-08-07 ENCOUNTER — APPOINTMENT (OUTPATIENT)
Dept: GENERAL RADIOLOGY | Facility: CLINIC | Age: 74
DRG: 233 | End: 2024-08-07
Attending: PHYSICIAN ASSISTANT
Payer: MEDICARE

## 2024-08-07 LAB
ALBUMIN SERPL BCG-MCNC: 2.7 G/DL (ref 3.5–5.2)
ALP SERPL-CCNC: 83 U/L (ref 40–150)
ALT SERPL W P-5'-P-CCNC: <5 U/L (ref 0–70)
ANION GAP SERPL CALCULATED.3IONS-SCNC: 12 MMOL/L (ref 7–15)
APTT PPP: 26 SECONDS (ref 22–38)
AST SERPL W P-5'-P-CCNC: 19 U/L (ref 0–45)
BILIRUB DIRECT SERPL-MCNC: <0.2 MG/DL (ref 0–0.3)
BILIRUB SERPL-MCNC: 0.4 MG/DL
BLD PROD TYP BPU: NORMAL
BLOOD COMPONENT TYPE: NORMAL
BUN SERPL-MCNC: 35 MG/DL (ref 8–23)
CALCIUM SERPL-MCNC: 8.4 MG/DL (ref 8.8–10.4)
CHLORIDE SERPL-SCNC: 101 MMOL/L (ref 98–107)
CODING SYSTEM: NORMAL
CREAT SERPL-MCNC: 1.23 MG/DL (ref 0.67–1.17)
CROSSMATCH: NORMAL
EGFRCR SERPLBLD CKD-EPI 2021: 61 ML/MIN/1.73M2
ERYTHROCYTE [DISTWIDTH] IN BLOOD BY AUTOMATED COUNT: 18.6 % (ref 10–15)
FIBRINOGEN PPP-MCNC: 590 MG/DL (ref 170–510)
GLUCOSE BLDC GLUCOMTR-MCNC: 147 MG/DL (ref 70–99)
GLUCOSE BLDC GLUCOMTR-MCNC: 150 MG/DL (ref 70–99)
GLUCOSE BLDC GLUCOMTR-MCNC: 198 MG/DL (ref 70–99)
GLUCOSE BLDC GLUCOMTR-MCNC: 205 MG/DL (ref 70–99)
GLUCOSE SERPL-MCNC: 145 MG/DL (ref 70–99)
HCO3 SERPL-SCNC: 20 MMOL/L (ref 22–29)
HCT VFR BLD AUTO: 25.2 % (ref 40–53)
HGB BLD-MCNC: 7.6 G/DL (ref 13.3–17.7)
HGB BLD-MCNC: 7.7 G/DL (ref 13.3–17.7)
HGB BLD-MCNC: 8.2 G/DL (ref 13.3–17.7)
HGB BLD-MCNC: 8.3 G/DL (ref 13.3–17.7)
INR PPP: 1.14 (ref 0.85–1.15)
ISSUE DATE AND TIME: NORMAL
MAGNESIUM SERPL-MCNC: 2.3 MG/DL (ref 1.7–2.3)
MCH RBC QN AUTO: 29.3 PG (ref 26.5–33)
MCHC RBC AUTO-ENTMCNC: 30.6 G/DL (ref 31.5–36.5)
MCV RBC AUTO: 96 FL (ref 78–100)
PHOSPHATE SERPL-MCNC: 3.6 MG/DL (ref 2.5–4.5)
PLATELET # BLD AUTO: 140 10E3/UL (ref 150–450)
POTASSIUM SERPL-SCNC: 4 MMOL/L (ref 3.4–5.3)
PROT SERPL-MCNC: 5.2 G/DL (ref 6.4–8.3)
RBC # BLD AUTO: 2.63 10E6/UL (ref 4.4–5.9)
SODIUM SERPL-SCNC: 133 MMOL/L (ref 135–145)
UNIT ABO/RH: NORMAL
UNIT NUMBER: NORMAL
UNIT STATUS: NORMAL
UNIT TYPE ISBT: 6200
WBC # BLD AUTO: 5.3 10E3/UL (ref 4–11)

## 2024-08-07 PROCEDURE — 272N000451 HC KIT SHRLOCK 5FR POWER PICC DOUBLE LUMEN

## 2024-08-07 PROCEDURE — 85018 HEMOGLOBIN: CPT | Performed by: PHYSICIAN ASSISTANT

## 2024-08-07 PROCEDURE — 250N000013 HC RX MED GY IP 250 OP 250 PS 637: Performed by: SURGERY

## 2024-08-07 PROCEDURE — 250N000013 HC RX MED GY IP 250 OP 250 PS 637

## 2024-08-07 PROCEDURE — 999N000157 HC STATISTIC RCP TIME EA 10 MIN

## 2024-08-07 PROCEDURE — 82374 ASSAY BLOOD CARBON DIOXIDE: CPT | Performed by: PHYSICIAN ASSISTANT

## 2024-08-07 PROCEDURE — 97530 THERAPEUTIC ACTIVITIES: CPT | Mod: GO

## 2024-08-07 PROCEDURE — 85027 COMPLETE CBC AUTOMATED: CPT | Performed by: PHYSICIAN ASSISTANT

## 2024-08-07 PROCEDURE — 74018 RADEX ABDOMEN 1 VIEW: CPT | Mod: 26 | Performed by: RADIOLOGY

## 2024-08-07 PROCEDURE — 71045 X-RAY EXAM CHEST 1 VIEW: CPT | Mod: 26 | Performed by: RADIOLOGY

## 2024-08-07 PROCEDURE — 36569 INSJ PICC 5 YR+ W/O IMAGING: CPT

## 2024-08-07 PROCEDURE — 97535 SELF CARE MNGMENT TRAINING: CPT | Mod: GO

## 2024-08-07 PROCEDURE — 82248 BILIRUBIN DIRECT: CPT | Performed by: PHYSICIAN ASSISTANT

## 2024-08-07 PROCEDURE — P9016 RBC LEUKOCYTES REDUCED: HCPCS

## 2024-08-07 PROCEDURE — 85610 PROTHROMBIN TIME: CPT | Performed by: PHYSICIAN ASSISTANT

## 2024-08-07 PROCEDURE — 99223 1ST HOSP IP/OBS HIGH 75: CPT | Mod: 24

## 2024-08-07 PROCEDURE — 84100 ASSAY OF PHOSPHORUS: CPT | Performed by: PHYSICIAN ASSISTANT

## 2024-08-07 PROCEDURE — 83735 ASSAY OF MAGNESIUM: CPT | Performed by: PHYSICIAN ASSISTANT

## 2024-08-07 PROCEDURE — 85018 HEMOGLOBIN: CPT

## 2024-08-07 PROCEDURE — 85730 THROMBOPLASTIN TIME PARTIAL: CPT | Performed by: PHYSICIAN ASSISTANT

## 2024-08-07 PROCEDURE — 250N000013 HC RX MED GY IP 250 OP 250 PS 637: Performed by: PHYSICIAN ASSISTANT

## 2024-08-07 PROCEDURE — 99418 PROLNG IP/OBS E/M EA 15 MIN: CPT

## 2024-08-07 PROCEDURE — 120N000005 HC R&B MS OVERFLOW UMMC

## 2024-08-07 PROCEDURE — 250N000011 HC RX IP 250 OP 636: Performed by: SURGERY

## 2024-08-07 PROCEDURE — 36415 COLL VENOUS BLD VENIPUNCTURE: CPT | Performed by: PHYSICIAN ASSISTANT

## 2024-08-07 PROCEDURE — 74018 RADEX ABDOMEN 1 VIEW: CPT

## 2024-08-07 PROCEDURE — 85384 FIBRINOGEN ACTIVITY: CPT | Performed by: PHYSICIAN ASSISTANT

## 2024-08-07 PROCEDURE — 999N000065 XR CHEST 1 VIEW

## 2024-08-07 RX ORDER — METOPROLOL TARTRATE 25 MG/1
25 TABLET, FILM COATED ORAL 2 TIMES DAILY
Status: DISCONTINUED | OUTPATIENT
Start: 2024-08-07 | End: 2024-08-10 | Stop reason: HOSPADM

## 2024-08-07 RX ORDER — TORSEMIDE 20 MG/1
20 TABLET ORAL DAILY
Status: DISCONTINUED | OUTPATIENT
Start: 2024-08-07 | End: 2024-08-08

## 2024-08-07 RX ORDER — LIDOCAINE 40 MG/G
CREAM TOPICAL
Status: DISCONTINUED | OUTPATIENT
Start: 2024-08-07 | End: 2024-08-10 | Stop reason: HOSPADM

## 2024-08-07 RX ORDER — AMIODARONE HYDROCHLORIDE 200 MG/1
200 TABLET ORAL DAILY
Status: DISCONTINUED | OUTPATIENT
Start: 2024-08-21 | End: 2024-08-10 | Stop reason: HOSPADM

## 2024-08-07 RX ORDER — LIDOCAINE 40 MG/G
CREAM TOPICAL
Status: DISCONTINUED | OUTPATIENT
Start: 2024-08-07 | End: 2024-08-07

## 2024-08-07 RX ORDER — AMIODARONE HYDROCHLORIDE 200 MG/1
400 TABLET ORAL DAILY
Status: DISCONTINUED | OUTPATIENT
Start: 2024-08-07 | End: 2024-08-10 | Stop reason: HOSPADM

## 2024-08-07 RX ORDER — PANTOPRAZOLE SODIUM 40 MG/1
40 TABLET, DELAYED RELEASE ORAL
Status: DISCONTINUED | OUTPATIENT
Start: 2024-08-07 | End: 2024-08-09

## 2024-08-07 RX ORDER — SUCRALFATE ORAL 1 G/10ML
2 SUSPENSION ORAL
Status: DISCONTINUED | OUTPATIENT
Start: 2024-08-07 | End: 2024-08-10 | Stop reason: HOSPADM

## 2024-08-07 RX ADMIN — PANTOPRAZOLE SODIUM 40 MG: 40 TABLET, DELAYED RELEASE ORAL at 15:32

## 2024-08-07 RX ADMIN — FLUTICASONE PROPIONATE 1 SPRAY: 50 SPRAY, METERED NASAL at 19:26

## 2024-08-07 RX ADMIN — SUCRALFATE 2 G: 1 SUSPENSION ORAL at 10:55

## 2024-08-07 RX ADMIN — INSULIN ASPART 1 UNITS: 100 INJECTION, SOLUTION INTRAVENOUS; SUBCUTANEOUS at 09:16

## 2024-08-07 RX ADMIN — INSULIN ASPART 3 UNITS: 100 INJECTION, SOLUTION INTRAVENOUS; SUBCUTANEOUS at 17:32

## 2024-08-07 RX ADMIN — SUCRALFATE 2 G: 1 SUSPENSION ORAL at 15:29

## 2024-08-07 RX ADMIN — Medication 5 MG: at 19:26

## 2024-08-07 RX ADMIN — SENNOSIDES AND DOCUSATE SODIUM 1 TABLET: 8.6; 5 TABLET ORAL at 19:26

## 2024-08-07 RX ADMIN — ACETAMINOPHEN 650 MG: 325 TABLET ORAL at 19:26

## 2024-08-07 RX ADMIN — INSULIN ASPART 1 UNITS: 100 INJECTION, SOLUTION INTRAVENOUS; SUBCUTANEOUS at 11:14

## 2024-08-07 RX ADMIN — SENNOSIDES AND DOCUSATE SODIUM 2 TABLET: 50; 8.6 TABLET ORAL at 15:32

## 2024-08-07 RX ADMIN — METOPROLOL TARTRATE 25 MG: 25 TABLET, FILM COATED ORAL at 09:07

## 2024-08-07 RX ADMIN — DILTIAZEM HYDROCHLORIDE 30 MG: 30 TABLET, FILM COATED ORAL at 15:29

## 2024-08-07 RX ADMIN — TORSEMIDE 20 MG: 20 TABLET ORAL at 09:06

## 2024-08-07 RX ADMIN — OXYBUTYNIN CHLORIDE 10 MG: 10 TABLET, EXTENDED RELEASE ORAL at 09:06

## 2024-08-07 RX ADMIN — DILTIAZEM HYDROCHLORIDE 30 MG: 30 TABLET, FILM COATED ORAL at 06:41

## 2024-08-07 RX ADMIN — AMIODARONE HYDROCHLORIDE 400 MG: 200 TABLET ORAL at 09:07

## 2024-08-07 RX ADMIN — ROSUVASTATIN CALCIUM 20 MG: 20 TABLET, FILM COATED ORAL at 09:07

## 2024-08-07 RX ADMIN — HEPARIN SODIUM 5000 UNITS: 5000 INJECTION, SOLUTION INTRAVENOUS; SUBCUTANEOUS at 05:56

## 2024-08-07 RX ADMIN — PANTOPRAZOLE SODIUM 40 MG: 40 TABLET, DELAYED RELEASE ORAL at 09:07

## 2024-08-07 RX ADMIN — EMPAGLIFLOZIN 10 MG: 10 TABLET, FILM COATED ORAL at 09:06

## 2024-08-07 RX ADMIN — POLYETHYLENE GLYCOL 3350 17 G: 17 POWDER, FOR SOLUTION ORAL at 15:32

## 2024-08-07 RX ADMIN — TAMSULOSIN HYDROCHLORIDE 0.4 MG: 0.4 CAPSULE ORAL at 09:06

## 2024-08-07 ASSESSMENT — ACTIVITIES OF DAILY LIVING (ADL)
ADLS_ACUITY_SCORE: 38
ADLS_ACUITY_SCORE: 37
ADLS_ACUITY_SCORE: 38
ADLS_ACUITY_SCORE: 37
ADLS_ACUITY_SCORE: 38
ADLS_ACUITY_SCORE: 37
ADLS_ACUITY_SCORE: 38
ADLS_ACUITY_SCORE: 38
ADLS_ACUITY_SCORE: 37
ADLS_ACUITY_SCORE: 38
ADLS_ACUITY_SCORE: 37
ADLS_ACUITY_SCORE: 37
ADLS_ACUITY_SCORE: 38
ADLS_ACUITY_SCORE: 37
ADLS_ACUITY_SCORE: 38
ADLS_ACUITY_SCORE: 37
ADLS_ACUITY_SCORE: 38

## 2024-08-07 NOTE — PROGRESS NOTES
"CLINICAL NUTRITION SERVICES - ASSESSMENT NOTE     Nutrition Prescription    RECOMMENDATIONS FOR MDs/PROVIDERS TO ORDER:  None at this time    Malnutrition Status:    Patient does not meet two of the established criteria necessary for diagnosing malnutrition but is at risk for malnutrition    Recommendations already ordered by Registered Dietitian (RD):  Modified to prn oral supplements    Future/Additional Recommendations:  -Rec continue current diet, as ordered. Liberalize diet if inadequate nutrition intake. Pt with increased protein needs, encourage intake.  -Monitor BG control. Hgb A1c of 6.7 on 7/1/2024. BG was 170 on 8/6. DM2.   -Order a multivitamin with minerals to help meet micronutrient needs, if inadequate intake.   -Check methylmalonic acid.   -Monitor stooling patterns and potential need to adjust bowel regimen.   -Consider need for iron supplementation.   -Consider checking vitamin D status     REASON FOR ASSESSMENT  Jorje Hutchinson is a/an 74 year old male assessed by the dietitian for LOS    NUTRITION/ADDITIONAL HISTORY  Pt not known to this service PTA.     Per H & P, \"PMH of CAD sp coronary angioplasty w/stent placement of circumflex coronary artery in 2002, HTN, HLD, SVT, HERRERA, CKD, T2DM, OA, prostate cancer, and worsening morbid obesity. He presents to [facility] for CORONARY ARTERY BYPASS GRAFT x2 (LIMA-LAD, SVG-LPL), Pulmonary vein isolation with RF ablation, LIGATION, LEFT ATRIAL APPENDAGE, sternal closure with plates and wires. .\" Pt was ordered to take, noting, jardiance, lantus, magnesium PTA. Note, shellfish and shrimp allergies.    Malnutrition screening tool/admission nutrition risk screen on admit:     Have you recently lost weight without trying?: no   Have you been eating poorly because of a decreased appetite?: no     Per discussion with pt and wife in room, pt's appetite was good PTA. 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 Has seen a dietitian in the past who " "gave pt a nice handout on carb choices. Acknowledged above food allergies.     CURRENT NUTRITION ORDERS  Diet: Moderate Consistent Carbohydrate and Low Saturated Fat/2400 mg Sodium since 7/31. Ordered to receive chocolate Ensure Max between meals.   Intake/Tolerance: Tolerating diet. Per nursing flowsheets (% intake), pt consuming 50% 7/31-8/1, % on 8/2, % on 8/3, 100% with a good appetite 8/4-8/5, and % with a fair to good appetite 8/6. Decreased appetite eating very little after surgery. Per pt, eating about 50% of his usual oral intake. States this is due to food choices in the hospital (taste of foods, foods are dry). Suspect diet restrictions may impact oral intake. Appetite has overall improved. Pt dislikes Ensure Max. Post.Bid.Ship (meal ordering system) indicates food/beverages sent 8/3-8/6 totaled 1368 kcals and 89 g protein daily on average.     LABS  Labs reviewed    MEDICATIONS  Medications reviewed    ANTHROPOMETRICS  Height: 174 cm (5' 8.5\")  Most Recent Weight: (!) 151.8 kg (334 lb 10.5 oz) (measured after pt voided in bathroom)    IBW: 71.4 kg   BMI: Obesity Grade III BMI >40  Weight History: 156.5 kg (5/19/2023), 156.5 kg (8/14/2023), 152.2 kg (5/22/2024), 151.2 kg (7/9/2024), 151.3 kg (7/30/2024, admit), 151.8 kg (8/6) - Pt has lost 3% of body wt over the last approximate year. Diuresis this admission  Dosing Weight: 91 kg (adjusted, based on lowest wt so far this admission of 151.3 kg on 7/30/24)    ASSESSED NUTRITION NEEDS (for inpatient hospital stay)  Estimated Energy Needs: 8665-3424 kcals/day (20 - 25 kcals/kg)  Justification: Estimated needs due to BMI  Estimated Protein Needs: 109-137 grams protein/day (1.2 - 1.5 grams of pro/kg)  Justification: Increased needs  Estimated Fluid Needs: 1956-5860 mL/day (25 - 30 mL/kg)   Justification: Maintenance needs or per provider, pending fluid status    PHYSICAL FINDINGS/OTHER FINDINGS  See malnutrition section below.  Resp: Extubated " on 7/30 (s/p CABG). No O2 most recently.   GI: Having zero to four stool/s daily on average. Last Bowel Movement: 08/06/24. On scheduled senna, held recently. GI consulted and signed off.   WOC: Not following at this time     MALNUTRITION  % Intake: Decreased intake does not quite meet criteria, but nearly meeting criteria  % Weight Loss: Weight loss does not meet criteria  Subcutaneous Fat Loss: None observed but difficult to assess with BMI  Muscle Loss: None observed but difficult to assess with BMI  Fluid Accumulation/Edema: Mild  Malnutrition Diagnosis: Patient does not meet two of the established criteria necessary for diagnosing malnutrition but is at risk for malnutrition    NUTRITION DIAGNOSIS  Inadequate oral intake related to decreased appetite post-op, food choices, and diet restrictions as evidenced by pt consuming 50% of his usual oral intake.      INTERVENTIONS  Implementation  Nutrition Education: Importance of adequate nutrition intake discussed with pt and wife. Encouraged intake of high protein foods/beverages. Provided verbal instruction on a heart-healthy diet. Discussed the various types of fats, limiting sodium, eating adequate fruits and vegetables, limiting simple carbohydrate (and sweets), choosing whole grains, and keeping a healthy weight. Provided handouts: Making Sense of Food Labels and Nutrition Care Manual Heart-Healthy Nutrition Therapy.  Encouraged goal setting. Per request of pt's wife, gave carb counting handout and Start Simple with MyPlate.   Medical food supplement therapy: Offered other oral supplements. Pt would rather eat high protein foods. Gave examples of high protein foods/beverages. Modified oral supplements to a prn snack/supplement order. Pt may request snacks/oral supplements prn.     Goals  Patient to consume % of nutritionally adequate meal trays TID, or the equivalent with supplements/snacks.     Monitoring/Evaluation  Progress toward goals will be  "monitored and evaluated per protocol.       Lisa Urbina, MS, RD, LD, CNSC      No longer available via pager  6C (beds [phone] and [phone]): Vocera \"6C Clinical Dietitian\"   Weekend/Holiday: Vocera \"Weekend Clinical Dietitian\"   "

## 2024-08-07 NOTE — DISCHARGE SUMMARY
[facility], [place]   Cardiothoracic Surgery Hospital Discharge Summary     Jorje Hutchinson MRN# [id number]   Age: 74 year old YOB: 1950     Admitting Physician:  Jeimy Mackenzie MD  Discharge Physician:  LESLIE Keenan CNP  Primary Care Physician:        Steve Zamora     DATE OF ADMISSION: 7/30/2024      DATE OF DISCHARGE: August 7, 2024     Admit Wt: 333 lbs  Discharge Wt: 331 lbs          Primary Diagnoses:   Coronary artery disease, s/p CABG x4 7/30  Atrial fibrillation  s/p bilateral PVI with radiofrequency ablation with Atricure device (modified MAZE procedure) and left atrial appendage exclusion 7/30/24   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 Fib  Junction Rhythm, resolved   Type 2 DM   Chronic 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  Acute on chronic rectal bleeding, stable/resolved   Constipation, improved   Acute blood loss anemia and thrombocytopenia   ULISES on CKD, ULISES resolved   Insomnia   Pericardial effusion     PROCEDURES PERFORMED:   Date: 7/30/24.  Surgeon: Dr. Deniz Wilson  PROCEDURES:   1.  Coronary bypass grafting x 2 (left internal mammary artery to left anterior descending artery, saphenous vein graft to left posterolateral artery).  2.  Endoscopic vein harvest.  3. Bilateral pulmonary vein isolation with radiofrequency ablation with Atricure device (modified maze procedure)  4. Left atrial appendage exclusion with 40 mm Atriclip  5. Sternal closure with Kenroy sternal plates    OPERATIVE FINDINGS:  The patient's sternum was of adequate quality.  Veins obtained were adequate for bypass grafting.  LIMA was adequate quality with good flow.  Vessels bypassed included the  LAD and the left posterolateral artery  which were both 2 mm vessel with proximal stenosis.     PATHOLOGY RESULTS:    none     CULTURE RESULTS:    none    CONSULTS:    PT/OT  Intensivist  Electrophysiology  Gastroenterology    BRIEF HISTORY OF ILLNESS:  Jorje Hutchinson is a 74 year old male with a PMH of CAD sp coronary angioplasty w/stent placement of circumflex coronary artery in 2002, HTN, HLD, SVT, chronic afib (on apixaban), HERRERA on home CPAP, CKD, T2DM, OA, prostate cancer, and worsening morbid obesity. He was referred to Dr. Wilson in the outpatient setting after a coronary angiogram showed multi-vessel disease. Patient is now s/p planned CABG x2 and PVI and LAAL with sternal plating on 7/30/24 with Dr. Deniz Wilson.     HOSPITAL COURSE: Jorje Hutchinson is a 74 year old male who on 7/30/2024 underwent the above-named procedures and tolerated the operation well.     Postoperatively was admitted to the CVICU.  Patient was extubated within protocol on POD #0.  Blood pressure and cardiac index were managed with vasopressors and inotropic agents which were continuously weaned until no longer needed.  Patient was subsequently  transferred to the surgical telemetry floor.    While on the surgical unit, the patient continued to progress well. Chest tubes and temporary pacemaker wires were removed when deemed appropriate.     Patient was fluid overloaded and treated with diuretics. They were at their preoperative weight and will discharge with 20 mg of torsemide daily (PTA dose). Echo demonstrated plump IVC. This should be re-evaluated by cardiology team.     8/7/24 low dose metoprolol was started in conjunction with the reduced dose of prior to admission diltiazem.  Converted to a junctional rhythm with heart rate in the 50s.  Patient converted back into A-fib with heart rate in the 80s in the evening on 8/8/2024.  Patient was discharged on Amiodarone 400 mg BID to finish 7 days and then 30 mg daily.  Electrophysiology was consulted during this admission, ultimately patient was not cardioverted due to risk with anticoagulation.  Patient has close cardiology follow-up scheduled on 8/21/2024, patient should be reassessed for anticoagulation and need for DCCV.  Metoprolol and diltiazem remain on hold in case patient converts back to sinus bradycardia and/or junctional rhythm to avoid lower heart rates.  If patient undergoes DCCV and remains in junctional rhythm would likely need PPM.    Small pericardial effusion noted on follow-up echocardiogram.  No tamponade physiology demonstrated on imaging.  Patient asymptomatic.  Would recommend follow-up echocardiogram and monitoring should patient be resumed on anticoagulation per your cardiology team.     Patient was transiently hyperglycemic and treated with insulin infusion then transitioned to sliding scale insulin per protocol. PTA jardiance resumed. Discharged on Lantus 30 units daily, PTA dose Lantus 38 units daily.  This should be followed by primary care team or endocrinology team.      Hospital course complicated by ongoing acute on chronic rectal bleeding secondary to chronic radiation proctitis.  Patient received 2 units of packed RBCs on 8/1 and 8/7.  Hemoglobin at discharge 9.0.  Patient was recommended to continue MiraLAX at home and avoid constipation.    Prior to discharge, his pain was controlled well, he was working well with therapies, able to perform most ADLs, ambulate without difficulty, and had full return of bowel and bladder function.  On August 10, 2024, he was discharged to home in stable condition. Follow up with cardiology and cardiac surgery have been arranged. Pt encouraged to follow up with PCP and cardiac rehab upon discharge.    Patient discharged on aspirin:  Yes 181 mg  Patient discharged on beta blocker: no, did not tolerate    Patient discharged on ACE Inhibitor/ARB: Yes     Patient discharged on statin: yes          Discharge  "Disposition:     Discharged to home            Condition on Discharge:     Discharge condition: Stable   Discharge vitals: Blood pressure (!) 150/65, pulse 73, temperature 98.3  F (36.8  C), temperature source Oral, resp. rate 17, height 1.74 m (5' 8.5\"), weight (!) 150.2 kg (331 lb 1.6 oz), SpO2 95%.   Code status on discharge: Full Code     Vitals:    08/09/24 0530 08/09/24 1000 08/10/24 0300   Weight: 144.1 kg (317 lb 9.6 oz) (!) 151.5 kg (334 lb 1.6 oz) (!) 150.2 kg (331 lb 1.6 oz)       DAY OF DISCHARGE PHYSICAL EXAM:    Gen: A&Ox4, NAD  Neuro: no focal deficits   CV: A, Fib in the 70s, no murmurs, rubs or gallops  Pulm: CTA, no wheezing or rhonchi, normal breathing on RA  Abd: nondistended, normal BS, soft, nontender  Ext: mild peripheral edema  Incision: clean, dry, intact, no erythema, sternum stable  Tubes/drain sites: dressing clean and dry    LABS  Most Recent 3 CBC's:  Recent Labs   Lab Test 08/10/24  0320 08/09/24  0324 08/08/24  0451   WBC 6.2 6.4 7.2   HGB 9.0* 9.0* 8.2*  8.2*   MCV 95 93 95    153 161      Most Recent 3 BMP's:  Recent Labs   Lab Test 08/10/24  0740 08/10/24  0320 08/09/24  2113 08/09/24  0751 08/09/24  0324 08/08/24  0807 08/08/24  0451   NA  --  135  --   --  131*  --  133*   POTASSIUM  --  3.7  --   --  4.1  --  4.6   CHLORIDE  --  102  --   --  97*  --  100   CO2  --  24  --   --  23  --  24   BUN  --  31.2*  --   --  38.1*  --  40.0*   CR  --  1.55*  --   --  1.76*  --  1.72*   ANIONGAP  --  9  --   --  11  --  9   VISH  --  8.1*  --   --  8.4*  --  8.6*   * 171* 180*   < > 153*   < > 166*    < > = values in this interval not displayed.     Most Recent 2 LFT's:  Recent Labs   Lab Test 08/07/24  0533 08/06/24  0006   AST 19 15   ALT <5 5   ALKPHOS 83 85   BILITOTAL 0.4 0.4       Most Recent 3 Troponin's:No lab results found.  Most Recent Cholesterol Panel:No lab results found.  Most Recent 6 Bacteria Isolates From Any Culture (See EPIC Reports for Culture Details):No " lab results found.  Most Recent TSH, T4 and A1c Labs:  Recent Labs   Lab Test 24  0825   A1C 6.7*      Recent Labs   Lab 08/10/24  0740 08/10/24  0320 24  2113 24  1720 24  1204 24  0751   * 171* 180* 168* 209* 161*       Imaging:    Exam: XR CHEST PORT 1 VIEW, 2024 9:54 AM     Comparison: Chest x-ray 2024     History: post-op monitoring for effusion     Findings:  Single view of the chest. Right arm PICC tip projects over the  superior vena cava. Sternotomy wires are intact. Left atrial appendage  clip stable. Trachea is midline. Cardiomediastinal silhouette stable.  No new airspace opacities. No pneumothorax. Stable trace right-sided  pleural effusion. The visualized upper abdomen is unremarkable. No  acute osseous abnormalities.                                                                      Impression: Stable trace right-sided pleural effusion. No new airspace  disease.    Recent Results (from the past 4320 hour(s))   Echo Complete    Narrative    [id number]  [id number]  [id number]  [id number]^JUDSON^HENRY^DEBBIE     [facility],[place]  Echocardiography Laboratory  [address] [place], [place] [address]     Name: JAYCEEQUETA  MRN: [id number]  : 1950  Study Date: 2024 11:21 AM  Age: 74 yrs  Gender: Male  [place] [facility] [place] For Study: CABG  Ordering Physician: HENRY ROPER  Performed By: Talia Hutchinson RDCS     BSA: 2.5 m2  Height: 68 in  Weight: 334 lb  BP: 119/63 mmHg  ______________________________________________________________________________  Procedure  Complete Portable Echo Adult. Contrast Optison. Technically difficult  study.Extremely poor acoustic windows. Optison (NDC #9411-3962-56) given  intravenously. Patient was given 5 ml mixture of 3 ml Optison and 6 ml saline.  4 ml wasted.  ______________________________________________________________________________  Interpretation Summary  Extremely  poor acoustic windows.  Grossly normal LV function  Grossly normal RV function.  Likely small organizing pericardial effusion at the RV free wall without  chamber compression.  Dilation of the inferior vena cava is present with abnormal respiratory  variation in diameter.  ______________________________________________________________________________  Left Ventricle  Global Left ventricular function cannot be assessed. Grossly normal LV  function.     Right Ventricle  The right ventricle is not well visualized. Grossly normal RV function.     Aortic Valve  Aortic valve is normal in structure and function.     Vessels  The aorta root is normal. Dilation of the inferior vena cava is present with  abnormal respiratory variation in diameter.     Pericardium  Likely small organizing pericardial effusion at the RV free wall without  chamber compression.  ______________________________________________________________________________  MMode/2D Measurements & Calculations  Ao root diam: 3.6 cm  asc Aorta Diam: 3.9 cm  LVOT diam: 2.4 cm  LVOT area: 4.6 cm2  Ao root diam index Ht(cm/m): 2.1  Ao root diam index BSA (cm/m2): 1.4  Asc Ao diam index BSA (cm/m2): 1.5  Asc Ao diam index Ht(cm/m): 2.3  LA Volume (BP): 80.3 ml     LA Volume Index (BP): 31.6 ml/m2     Doppler Measurements & Calculations  PA acc time: 0.10 sec     ______________________________________________________________________________  Report approved by: Jayesh Meyers 08/09/2024 12:27 PM                   PRE-ADMISSION MEDICATIONS:  Medications Prior to Admission   Medication Sig Dispense Refill Last Dose    allopurinol (ZYLOPRIM) 100 MG tablet Take 100 mg by mouth 2 times daily   7/30/2024    aspirin 81 MG EC tablet Take 81 mg by mouth every morning   7/29/2024    empagliflozin (JARDIANCE) 10 MG TABS tablet Take 10 mg by mouth every morning   Unknown    exenatide ER (BYDUREON BCISE) 2 MG/0.85ML auto-injector Inject 2 mg Subcutaneous every 7 days Every Sunday    Past Week    magnesium chloride 535 (64 Mg) MG TBEC CR tablet Take 535 mg by mouth 2 times daily   Past Week    oxyBUTYnin ER (DITROPAN XL) 10 MG 24 hr tablet Take 10 mg by mouth every morning   7/30/2024    tamsulosin (FLOMAX) 0.4 MG capsule Take 0.4 mg by mouth at bedtime   7/30/2024    torsemide (DEMADEX) 20 MG tablet Take 20 mg by mouth every morning   7/29/2024    triamcinolone (KENALOG) 0.1 % external cream Apply 1 Application topically as needed   Unknown    vitamin D3 (CHOLECALCIFEROL) 10 MCG (400 UNIT) capsule Take 1 capsule by mouth every morning   Past Week    [DISCONTINUED] clopidogrel (PLAVIX) 75 MG tablet Take 75 mg by mouth every morning   Past Week    [DISCONTINUED] diltiazem (CARDIZEM) 30 MG tablet Take 30 mg by mouth as needed   7/30/2024    [DISCONTINUED] diltiazem ER COATED BEADS (CARDIZEM CD/CARTIA XT) 120 MG 24 hr capsule Take 120 mg by mouth every morning   7/30/2024    [DISCONTINUED] insulin glargine (LANTUS SOLOSTAR) 100 UNIT/ML pen Inject 38 Units Subcutaneous every morning   7/29/2024    [DISCONTINUED] losartan (COZAAR) 25 MG tablet Take 25 mg by mouth at bedtime   Unknown    [DISCONTINUED] sotalol (BETAPACE) 80 MG tablet Take 80 mg by mouth every morning   7/30/2024    [DISCONTINUED] spironolactone (ALDACTONE) 25 MG tablet Take 25 mg by mouth every morning   7/29/2024       DISCHARGE MEDICATIONS:   Current Discharge Medication List        START taking these medications    Details   acetaminophen (TYLENOL) 325 MG tablet Take 2 tablets (650 mg) by mouth every 4 hours as needed for other (For optimal non-opioid multimodal pain management to improve pain control.)    Associated Diagnoses: S/P CABG (coronary artery bypass graft)      amiodarone (PACERONE) 200 MG tablet Take 2 tablets (400 mg) by mouth daily for 5 days, THEN 1 tablet (200 mg) daily for 30 days.  Qty: 40 tablet, Refills: 0    Associated Diagnoses: S/P CABG (coronary artery bypass graft)      bisacodyl (DULCOLAX) 10 MG  suppository Place 1 suppository (10 mg) rectally daily as needed for constipation (Use if magnesium hydroxide (MILK of MAGNESIA) is not effective after 24 hours. May discontinue if patient having bowel movement.)    Associated Diagnoses: S/P CABG (coronary artery bypass graft)      melatonin 5 MG tablet Take 1 tablet (5 mg) by mouth every evening    Associated Diagnoses: S/P CABG (coronary artery bypass graft)      methocarbamol (ROBAXIN) 500 MG tablet Take 1 tablet (500 mg) by mouth or Feeding Tube every 6 hours as needed for muscle spasms  Qty: 20 tablet, Refills: 0    Associated Diagnoses: S/P CABG (coronary artery bypass graft)      pantoprazole (PROTONIX) 40 MG EC tablet Take 1 tablet (40 mg) by mouth every morning (before breakfast) for 20 days Indicated for 30 days total following your cardiac surgery.  Qty: 20 tablet, Refills: 0    Associated Diagnoses: S/P CABG (coronary artery bypass graft)      polyethylene glycol (MIRALAX) 17 GM/Dose powder Take 17 g by mouth daily  Qty: 510 g, Refills: 0    Associated Diagnoses: S/P CABG (coronary artery bypass graft)      rosuvastatin (CRESTOR) 20 MG tablet Take 1 tablet (20 mg) by mouth daily  Qty: 30 tablet, Refills: 1    Associated Diagnoses: S/P CABG (coronary artery bypass graft)      senna-docusate (SENOKOT-S/PERICOLACE) 8.6-50 MG tablet Take 1-2 tablets by mouth or Feeding Tube 2 times daily as needed for constipation  Qty: 100 tablet, Refills: 0    Comments: Pharmacy to adjust quantity to equal one bottle.  Associated Diagnoses: S/P CABG (coronary artery bypass graft)           CONTINUE these medications which have CHANGED    Details   insulin glargine (LANTUS PEN) 100 UNIT/ML pen Inject 30 Units subcutaneously every morning (before breakfast)    Comments: If Lantus is not covered by insurance, may substitute Basaglar or Semglee or other insulin glargine product per insurance preference at same dose and frequency.    Associated Diagnoses: S/P CABG (coronary  artery bypass graft)      losartan (COZAAR) 25 MG tablet Take 0.5 tablets (12.5 mg) by mouth daily    Associated Diagnoses: S/P CABG (coronary artery bypass graft)           CONTINUE these medications which have NOT CHANGED    Details   allopurinol (ZYLOPRIM) 100 MG tablet Take 100 mg by mouth 2 times daily      aspirin 81 MG EC tablet Take 81 mg by mouth every morning      empagliflozin (JARDIANCE) 10 MG TABS tablet Take 10 mg by mouth every morning      exenatide ER (BYDUREON BCISE) 2 MG/0.85ML auto-injector Inject 2 mg Subcutaneous every 7 days Every Sunday      magnesium chloride 535 (64 Mg) MG TBEC CR tablet Take 535 mg by mouth 2 times daily      oxyBUTYnin ER (DITROPAN XL) 10 MG 24 hr tablet Take 10 mg by mouth every morning      tamsulosin (FLOMAX) 0.4 MG capsule Take 0.4 mg by mouth at bedtime      torsemide (DEMADEX) 20 MG tablet Take 20 mg by mouth every morning      triamcinolone (KENALOG) 0.1 % external cream Apply 1 Application topically as needed      vitamin D3 (CHOLECALCIFEROL) 10 MCG (400 UNIT) capsule Take 1 capsule by mouth every morning           STOP taking these medications       clopidogrel (PLAVIX) 75 MG tablet Comments:   Reason for Stopping:         diltiazem (CARDIZEM) 30 MG tablet Comments:   Reason for Stopping:         diltiazem ER COATED BEADS (CARDIZEM CD/CARTIA XT) 120 MG 24 hr capsule Comments:   Reason for Stopping:         sotalol (BETAPACE) 80 MG tablet Comments:   Reason for Stopping:         spironolactone (ALDACTONE) 25 MG tablet Comments:   Reason for Stopping:                CC:Steve Silva DNP APRN CNP CCRN   Cardiovascular Surgery   Pager [phone]    [facility]   Cardiothoracic Surgery  Office phone: [phone]  Office fax: [phone]

## 2024-08-07 NOTE — PROGRESS NOTES
Received order for midline. Spoke with provider Sandra Joaquin.  Indication for midline is frequent lab draws and hemodynamic unstability related to GI bleed.     She will place order for PICC and cancel midline order.    Plan: DL PICC will be placed today by [facility].

## 2024-08-07 NOTE — PROCEDURES
[facility]    Double Lumen PICC Placement    Date/Time: 8/7/2024 2:16 PM    Performed by: VENESSA Barrera  Authorized by: Deniz Wilson MD  Indications: vascular access      UNIVERSAL PROTOCOL   Site Marked: Yes  Prior Images Obtained and Reviewed:  Yes  Required items: Required blood products, implants, devices and special equipment available    Patient identity confirmed:  Verbally with patient, arm band, provided demographic data, hospital-assigned identification number and anonymous protocol, patient vented/unresponsive  Patient was reevaluated immediately before administering moderate or deep sedation or anesthesia  Confirmation Checklist:  Patient's identity using two indicators, relevant allergies, procedure was appropriate and matched the consent or emergent situation and correct equipment/implants were available  Time out: Immediately prior to the procedure a time out was called    Universal Protocol: the Joint Commission Universal Protocol was followed    Preparation: Patient was prepped and draped in usual sterile fashion       ANESTHESIA    Anesthesia:  See MAR for details  Local Anesthetic:  Lidocaine 1% without epinephrine  Anesthetic Total (mL):  3      SEDATION    Patient Sedated: No        Preparation: skin prepped with ChloraPrep  Skin prep agent: skin prep agent completely dried prior to procedure  Sterile barriers: maximum sterile barriers were used: cap, mask, sterile gown, sterile gloves, and large sterile sheet  Hand hygiene: hand hygiene performed prior to central venous catheter insertion  Type of line used: PICC  Catheter type: double lumen  Lumen type: non-valved and power PICC  Lumen Identification: Purple and Red  Catheter size: 5 Fr  Brand: Bard  Lot number: [id number]  Placement method: venipuncture, MST, ultrasound and tip navigation system  Number of attempts: 1  Difficulty threading catheter: yes  Successful placement: yes  Orientation:  right    Location: basilic vein  Tip Location: SVC  Arm circumference: adults 10 cm  Extremity circumference: 39  Visible catheter length: 3  Total catheter length: 52  Dressing and securement: chlorhexidine patch applied, dressing applied, line secured, statlock, sterile dressing applied and transparent dressing  Post procedure assessment: blood return through all ports, free fluid flow and placement verified by x-ray  PROCEDURE   Patient Tolerance:  Patient tolerated the procedure well with no immediate complicationsDescribe Procedure: Picc ok to use  Disposal: sharps and needle count correct at the end of procedure, needles and guidewire disposed in sharps container

## 2024-08-07 NOTE — CONSULTS
"    Gastroenterology Consultation and Sign-Off  Note  GI Luminal Service    Date of Admission:  7/30/2024  Reason for Admission: CABG x2 (LIMA-LAD, SVG-LPL), pulmonary vein isolation with RF ablation, left atrial appendage ligation, sternal closure with plates and wires  Date of Consult  8/7/2024   Requesting Physician:  Deniz Wilson MD           ASSESSMENT AND RECOMMENDATIONS:   Assessment:  Jorje \"Vincent\" Evangelina is a 74 year old male with a history of prostate cancer status-post radiation in 2023 complicated by chronic radiation proctitis with chronic rectal bleeding and chronic normocytic anemia, coronary artery disease status-post coronary angioplasty with stent placement of circumflex coronary artery in 2002, hypertension, hyperlipidemia, SVT, chronic atrial fibrillation on Apixaban PTA, obstructive sleep apnea on CPAP, chronic kidney disease, osteoarthritis, type 2 diabetes, obesity, insomnia who was admitted on 7/30/2024 after coronary angiogram showed multi-vessel disease, now status-post planned CABG x2, bilateral pulmonary vein isolation with radiofrequency ablation with Atricure device (modified MAZE procedure), left atrial appendage ligation, and sternal closure with sternal plating on 7/30/2024. Patient is on Aspirin 162 mg only with reportedly no plan for anticoagulation or PTA plavix per Dr. Wilson.    The GI Luminal Service was consulted on 8/7/2024 regarding: \"s/p CABG, rectal GI bleed, hx of bleeding after radiation proctitis. Saw GI 7/9 as outpatient prior to CABG.\"      # Acute Constipation  # Acute on Chronic Rectal Bleeding  # Chronic Radiation Proctitis  # Acute on Chronic Normocytic Anemia    Current Anticoagulation/Antiplatelet therapy: Heparin 5000 units subcutaneous TID, Aspirin 162 mg daily.     Hemoglobin remains relatively stable: 8.7 g/dL (8/2) --> 8.9 g/dL (8/3) --> 8.9 g/dL (8/4) --> 8.8 g/dL (8/5) --> 8.5 g/dL (8/6) --> 7.7 g/dL (8/7) --> recheck 8.2 g/dL (8.7).     Bowel regimen " per MAR:   - Given Milk of Magnesia 30 mL on 8/1.  - Scheduled: Polyethylene Glycol 17 gm daily given 7/31 and 8/1 but no doses given since; senna-docusate 1 tablet given 7/30 and 1 tablet BID given 7/31 and 8/1 but not given since; psyllium 1 packet PO daily started on 8/5 and 8/6 but not given on 8/7.       Patient with acute on chronic normocytic anemia and chronic rectal bleeding with known radiation proctitis in the setting of prior prostate radiotherapy in 2023, recently seen in outpatient [facility] by Dr. Partha Rincon for these chronic conditions. Patient has had endoscopic therapy with 2 sessions of argon plasma coagulation in January 2024 and April 2024 with some improvement in rectal bleeding. Patient also previous had iron infusions x2. Rectal bleeding symptoms reportedly were mild, low volume. Recommendation should patient develop rectal bleeding after CABG is initiation of sucralfate enemas (20 mL of 10% sucralfate suspension in water twice daily). If this fails to control rectal bleeding in the hospital setting, discussion regarding endoscopic therapy.       Now patient is inpatient status-post CABG 7/30/2024, Today is Day 8 of this patient's hospitalization. Patient remains hemodynamically stable with relatively stable hemoglobin. No hemodynamically significant GI bleeding (hematemesis, hematochezia, bright red blood per rectum, melena [black, tarry, sticky stool]) at this time. In the absence of hemodynamically significant overt GI bleeding, the pretest probability of finding a GI endoscopically intervenable lesion is low. No acute/urgent indication for GI endoscopy at this time.     Per chart review and discussion with patient, concern for ongoing constipation and passage of hard stool requiring straining to evacuate stool and pain with stool evacuation with subsequent bright red blood per rectum, which is a clinical picture most consistent with rectal outlet bleeding with differential  including known chronic bleeding from chronic radiation proctitis versus potentially hemorrhoidal bleeding versus anal fissure. Additionally, the stool documented in the I/O Flowsheet recently is formed but missing days of passing any stool, concerning for ongoing constipation. Encouraged patient to take the Polyethylene glycol 1 capful at least once daily. Recommend AXR to evaluate stool burden to determine whether bowel regimen needs to be escalated. Favor continuing supportive cares and conservative management with scheduled bowel regimen, adequate hydration and strict documentation of rectal output. If the patient experiences overt GI bleeding with hemodynamic instability, please page the GI Luminal Service (listed on [facility]).      Future outpatient elective treatment considerations for chronic radiation proctitis would be proceeding with radiofrequency ablation, tentatively 2 sessions in around 3 to 4 months, as it will be preferable to hold antiplatelet/anticoagulation medications prior to RFA, though baby aspirin is acceptable. Further endoscopic therapy to consider if needed could also include cryoablation.        Recommendations:  -- No indication for acute/emergent GI Endoscopic intervention.     -- AXR to evaluate stool burden, concern for constipation with overflow loose stools.   - If moderate to large stool burden on AXR, increase scheduled daily polyethylene glycol to BID or TID.    -- Start Sucralfate enemas (20 mL of 10% sucralfate suspension in water twice daily) for radiation proctitis.    -- Continue daily scheduled Maintenance Bowel Regimen and encourage patient to take the Miralax:   - Miralax 1-2 capfuls daily, titrated to promote 1-2 soft, continent, easy to evacuate bowel movements daily (minimum 3 bowel movements weekly).  - If moderate to large stool burden on AXR, increase scheduled daily polyethylene glycol to BID or TID.  - If no bowel movement after 3 days, recommend increasing Miralax  2 additional capfuls and add glycerin suppository BID (held in the rectum for at least 15 minutes).  - If no bowel movement after 5 days, continue above and add tap water enema or mineral oil enema BID (held in the rectum for at least 15 minutes).  - If no bowel movement after 7 days, proceed with Miralax-Gatorade Bowel Cleanse: 238 grams of Miralax with 64 ounces of Gatorade (no red, blue or purple), drink 12 ounces every 15 minutes until the solution is gone (finished in 2 hours).  - Caution with stimulant laxatives (bisacodyl, dulcolax) as they can lead to abdominal cramping, nausea +/- vomiting. These can be utilized on a PRN (as needed) basis.   - Avoid the use of lactulose for constipation as this leads to abdominal distension and bloating +/- nausea.      -- Strict documentation of rectal output.  - Appreciate detailed documentation of stool appearance, including color, consistency, frequency and amount.   - Consider smearing stool thinly on white paper towel to distinguish color.     -- Regarding anticoagulation/antiplatelet therapy, from a GI perspective, no contraindication to restarting/continuing anticoagulation/antiplatelets. Defer decision and risk/benefit discussion to primary team.     -- Agree with PPI 40 mg PO BID for GI prophylaxis.   - Also reasonable for daily in the absence of upper GI symptoms.     -- Continue Supportive Cares  - Adequate volume resuscitation with IVF, pRBCs.  - Monitor Hemoglobin closely. Transfuse to keep Hgb > 7 g/dL from GI standpoint, though ultimately defer hemoglobin transfusion threshold to cardiology.   - Monitor Platelets closely. Keep PLT > 50 10e3/uL from GI standpoint.   - Maintain hemodynamics: MAP >65 mmHg and HR <100.  - Monitor and optimize electrolytes.  - Monitor and optimize nutrition. --> Diet per primary team. Appreciate RD nutrition recs.   - Reposition every 30 minutes while awake.  - Encourage Ambulation as able: 4-6 walks per day.   -- Avoid  "NSAIDs.  -- Analgesics/Antiemetics per primary team.  -- If the patient experiences overt GI bleeding with hemodynamic instability, please page the GI Luminal Service (listed on [employer]).       Outpatient:   -- As recommended by outpatient [facility] GI physician Dr. Partha Rincon: in 3-4 months, outpatient flexible sigmoidoscopy with radiofrequency ablation at [facility] with Dr. Rincon to treat chronic radiation proctitis (which will require a full bowel preparation for the procedure), tentative plan for 2 sessions.   - [facility] Outpatient GI Scheduling phone: [phone], option 2 for endoscopy procedure scheduling.       COVID status: not tested this admission.     Discussed with Sandra Joaquin PA-C - WILLI.     The inpatient gastroenterology service will sign off at this time. Thank you for allowing us to participate in the care of this patient. Please do not hesitate to page the GI service with any questions or concerns.     The patient was discussed and plan agreed upon with Dr. Chary Camarillo, GI Luminal Service staff physician.    Overall time spent on the date of this encounter preparing to see the patient (including chart review of available notes, clinical status events, imaging and labs); obtaining and/or reviewing separately obtained history from patient's wife Cassie; discussing, ordering, coordinating recommended medications, tests or procedures; communicating with other health care professionals; and documenting the above clinical information in the electronic medical record was 100 minutes.    Mindi Hammond PA-C  Inpatient Gastroenterology Service  [facility]  Text Page         History of Present Illness:   Patient seen and examined at 1015. History is obtained from patient, patient's wife Cassie, and .    Jorje \"Vincent\" Evangelina is a 74 year old male with a history of prostate cancer status-post radiation in 2023 complicated by chronic " "radiation proctitis with chronic rectal bleeding and chronic normocytic anemia, coronary artery disease status-post coronary angioplasty with stent placement of circumflex coronary artery in 2002, hypertension, hyperlipidemia, SVT, chronic atrial fibrillation on Apixaban PTA, obstructive sleep apnea on CPAP, chronic kidney disease, osteoarthritis, type 2 diabetes, obesity, insomnia who was admitted on 7/30/2024 after coronary angiogram showed multi-vessel disease, now status-post planned CABG x2, bilateral pulmonary vein isolation with radiofrequency ablation with Atricure device (modified MAZE procedure), left atrial appendage ligation, and sternal closure with sternal plating on 7/30/2024. Patient is on Aspirin 162 mg only with reportedly no plan for anticoagulation or PTA plavix per Dr. Wilson.    The GI Luminal Service was consulted on 8/7/2024 regarding: \"s/p CABG, rectal GI bleed, hx of bleeding after radiation proctitis. Saw GI 7/9 as outpatient prior to CABG.\"      Rectal bleeding first started 12/4/2023. Had colonoscopy with RFA in January 2024 during a hospitalization, and flexible sigmoidoscopy April 2024 with RFA.     Denies nausea, vomiting, acid reflux, abdominal pain.    Reports fluctuating bowel movements between hard formed stools to liquid stools since admission.     Reports taking Metamucil PTA.     Reports the RN was concerned about the blood per rectum this morning.     Discussed assessment and plan as above. Patient expressed understanding and had no additional questions/concerns.         Bowel movements per I/O Flowsheet:   8/7:   - 0700: 550 mL, watery, red.  - 0600: loose, red streaks, maroon.  8/6:   - 2100: x1 not described.  - 1600: medium, formed, bloody, red streaks, brown.  8/5:   - 2100: bloody, loose, watery, red, clots.  - 1000: bloody, loose, watery, red, clots.  - 0400: small, loose, watery, maroon.  - 0300: medium, loose, maroon.  - 0000: smear, loose, brown, red streaks.  8/4:   - " No documented bowel movements.  8/3:   - 2000: small, formed, brown, red streaks.   - 0400: medium, formed, soft, maroon, red streaks.  - 0000: medium, soft, brown, maroon, red streaks.  8/2:   - 1700: small, mucous, tan.   - 1200: smear, brown.  - 1000: small, soft, brown  - 0800: large, soft, brown.      Previous Procedures:    4/11/2024 - Flexible Sigmoidoscopy - [facility]          1/2024 - Reportedly full colonoscopy in January 2024 in [place] while inpatient but unable to view the procedure report or pathology. Reported completed RFA with this procedure as well.               Past Medical History:   Reviewed and edited as appropriate  Past Medical History:   Diagnosis Date    CAD (coronary artery disease)     HLD (hyperlipidemia)     HTN (hypertension)     Morbid obesity (H)     HERRERA (obstructive sleep apnea)     Osteoarthritis     Proctitis     radiation induced    Prostate cancer (H)     SVT (supraventricular tachycardia) (H24)             Past Surgical History:   Reviewed and edited as appropriate   Past Surgical History:   Procedure Laterality Date    AS TOTAL KNEE ARTHROPLASTY Right     BYPASS GRAFT ARTERY CORONARY N/A 7/30/2024    Procedure: CORONARY ARTERY BYPASS GRAFT;  Surgeon: Deniz Wilson MD;  Location: [facility] OR    CARDIAC CATHERIZATION  2002    COLONOSCOPY  2006    CORONARY ANGIOPLASTY  2002    w/ stent    CV CORONARY ANGIOGRAM  05/22/2024    EXC SKIN BENIG >4CM REMAINDR BODY Right 1989    forearm & neck    MAZE PROCEDURE N/A 7/30/2024    Procedure: WINTER MAZE PROCEDURE;  Surgeon: Deniz Wilson MD;  Location:  OR    OR LIGATION, LEFT ATRIAL APPENDAGE N/A 7/30/2024    Procedure: LIGATION, LEFT ATRIAL APPENDAGE, OPEN **LATEX ALLERGY**;  Surgeon: Deniz Wilson MD;  Location:  OR              Social History:   The patient lives in [place], [place].     Alcohol: Once per month.  Tobacco: Denies. Occasional cigar 20+ years ago.   Illicit drugs: Denies.            Family History:    Patient's family history is reviewed today and is non-contributory.    Family History   Problem Relation Age of Onset    Anesthesia Reaction No family hx of     Deep Vein Thrombosis (DVT) No family hx of              Allergies:   Reviewed and edited as appropriate     Allergies   Allergen Reactions    Shellfish-Derived Products Anaphylaxis, Difficulty breathing and Photosensitivity    Shrimp      Other Reaction(s): Swelling of eye, Swelling of throat    Latex Blisters, Dermatitis, Hives, Itching and Rash            Medications:     Current Facility-Administered Medications   Medication Dose Route Frequency Provider Last Rate Last Admin    acetaminophen (TYLENOL) tablet 650 mg  650 mg Oral or Feeding Tube Q4H PRN Blayne Hammond MD        amiodarone (NEXTERONE) 1.8 mg/mL in sodium chloride 0.9% in non-PVC container 500 mL ADULT STANDARD infusion  0.5 mg/min Intravenous Continuous Sandra Joaquin PA-C 16.67 mL/hr at 08/07/24 0600 0.5 mg/min at 08/07/24 0600    amiodarone (PACERONE) tablet 400 mg  400 mg Oral Daily Sandra Joaquin PA-C   400 mg at 08/07/24 0907    Followed by    [START ON 8/21/2024] amiodarone (PACERONE) tablet 200 mg  200 mg Oral Daily Sandra Joaquin PA-C        [Held by provider] aspirin (ASA) chewable tablet 162 mg  162 mg Oral or NG Tube Daily Blayne Hammond MD   162 mg at 08/06/24 0754    bisacodyl (DULCOLAX) suppository 10 mg  10 mg Rectal Daily PRN Blayne Hammond MD        glucose gel 15-30 g  15-30 g Oral Q15 Min PRN Blayne Hammond MD        Or    dextrose 50 % injection 25-50 mL  25-50 mL Intravenous Q15 Min PRN Blayne Hammond MD        Or    glucagon injection 1 mg  1 mg Subcutaneous Q15 Min PRN Blayne Hammond MD        diltiazem (CARDIZEM) tablet 30 mg  30 mg Oral Q8H [facility] Ruth Gaspar APRN CNP   30 mg at 08/07/24 0641    empagliflozin (JARDIANCE) tablet 10 mg  10 mg Oral QAM Sandra Joaquin PA-C   10 mg at 08/07/24 0906    fluticasone (FLONASE) 50  MCG/ACT spray 1 spray  1 spray Both Nostrils QPM Lachelle Mauricio MD   1 spray at 08/06/24 1951    [Held by provider] heparin ANTICOAGULANT injection 5,000 Units  5,000 Units Subcutaneous Q8H Blayne Hammond MD   5,000 Units at 08/07/24 0556    hydrALAZINE (APRESOLINE) injection 10 mg  10 mg Intravenous Q30 Min PRN Blayne Hammond MD        insulin aspart (NovoLOG) injection (RAPID ACTING)  1-10 Units Subcutaneous TID AC Kevin Aguila PA-C   1 Units at 08/07/24 0916    insulin aspart (NovoLOG) injection (RAPID ACTING)  1-7 Units Subcutaneous At Bedtime Kevin Aguila PA-C   3 Units at 08/04/24 2112    insulin glargine (LANTUS PEN) injection 10 Units  10 Units Subcutaneous QAM AC Sandra Joaquin PA-C        lidocaine (LMX4) cream   Topical Q1H PRN Sandra Joaquin PA-C        lidocaine (LMX4) cream   Topical Q1H PRN Blayne Hammond MD        lidocaine 1 % 0.1-1 mL  0.1-1 mL Other Q1H PRN Sandra Joaquin PA-C        lidocaine 1 % 0.1-1 mL  0.1-1 mL Other Q1H PRN Blayne Hammond MD        magnesium hydroxide (MILK OF MAGNESIA) suspension 30 mL  30 mL Oral or Feeding Tube Daily PRN Blayne Hammond MD   30 mL at 08/01/24 1717    melatonin tablet 5 mg  5 mg Oral QPM Mallika Del Angel [facility]   5 mg at 08/06/24 1951    methocarbamol (ROBAXIN) tablet 500 mg  500 mg Oral or Feeding Tube Q6H PRN Blayne Hammond MD   500 mg at 08/02/24 0108    metoprolol tartrate (LOPRESSOR) tablet 25 mg  25 mg Oral BID Sandra Joaquin PA-C   25 mg at 08/07/24 0907    naloxone (NARCAN) injection 0.2 mg  0.2 mg Intravenous Q2 Min PRN Deniz Wilson MD        Or    naloxone (NARCAN) injection 0.4 mg  0.4 mg Intravenous Q2 Min PRN Deniz Wilson MD        Or    naloxone (NARCAN) injection 0.2 mg  0.2 mg Intramuscular Q2 Min PRN Deniz Wilson MD        Or    naloxone (NARCAN) injection 0.4 mg  0.4 mg Intramuscular Q2 Min PRN Deniz Wilson MD        ondansetron (ZOFRAN ODT) ODT tab 4 mg  4 mg Oral Q6H PRN  Blayne Hammond MD        Or    ondansetron (ZOFRAN) injection 4 mg  4 mg Intravenous Q6H PRN Blayne Hammond MD        oxyBUTYnin ER (DITROPAN XL) 24 hr tablet 10 mg  10 mg Oral Emy Bass, LESLIE CNP   10 mg at 08/07/24 0906    pantoprazole (PROTONIX) EC tablet 40 mg  40 mg Oral BID Sandra Otero PA-C   40 mg at 08/07/24 0907    Patient may continue current oral medications   Does not apply Continuous PRN Neal Bray MD        polyethylene glycol (MIRALAX) Packet 17 g  17 g Oral or Feeding Tube Daily PRN Tony Lakhani MD        polyethylene glycol (MIRALAX) Packet 17 g  17 g Oral or Feeding Tube Daily Blayne Hammond MD   17 g at 08/01/24 0902    prochlorperazine (COMPAZINE) injection 5 mg  5 mg Intravenous Q6H PRN Blayne Hamomnd MD        Or    prochlorperazine (COMPAZINE) tablet 5 mg  5 mg Oral or Feeding Tube Q6H PRN Blayne Hammond MD        psyllium (METAMUCIL/KONSYL) Packet 1 packet  1 packet Oral Daily Nathan Gregorio MD   1 packet at 08/06/24 0754    rosuvastatin (CRESTOR) tablet 20 mg  20 mg Oral Daily Kevin Aguila PA-C   20 mg at 08/07/24 0907    senna-docusate (SENOKOT-S/PERICOLACE) 8.6-50 MG per tablet 1 tablet  1 tablet Oral or Feeding Tube BID PRN Tony Lakhani MD        Or    senna-docusate (SENOKOT-S/PERICOLACE) 8.6-50 MG per tablet 2 tablet  2 tablet Oral or Feeding Tube BID PRN Tony Lakhani MD        senna-docusate (SENOKOT-S/PERICOLACE) 8.6-50 MG per tablet 1 tablet  1 tablet Oral or Feeding Tube BID Blayne Hammond MD   1 tablet at 08/01/24 2052    sodium chloride (OCEAN) 0.65 % nasal spray 1 spray  1 spray Both Nostrils Q1H PRN Soledad Quesada, APRN CNP   1 spray at 08/04/24 9838    sodium chloride (PF) 0.9% PF flush 10 mL  10 mL Intracatheter Q8H Sandra Joaquin PA-C        sodium chloride (PF) 0.9% PF flush 10-20 mL  10-20 mL Intracatheter q1 min prn Sandra Joaquin PA-C        sodium chloride (PF) 0.9% PF flush 3  mL  3 mL Intracatheter Q8H Balyne Hammond MD   3 mL at 08/07/24 0909    sodium chloride (PF) 0.9% PF flush 3 mL  3 mL Intracatheter q1 min prn Blayne Hammond MD        sucralfate (CARAFATE) suspension 2 g  2 g Rectal bid 08 & 14 Sandra Joaquin PA-C        tamsulosin (FLOMAX) capsule 0.4 mg  0.4 mg Oral Daily Kevin Aguila PA-C   0.4 mg at 08/07/24 0906    torsemide (DEMADEX) tablet 20 mg  20 mg Oral Daily Sandra Joaquin PA-C   20 mg at 08/07/24 0906    traZODone (DESYREL) half-tab 25 mg  25 mg Oral At Bedtime PRN Emy Garcia APRN CNP   25 mg at 08/04/24 2113    Or    traZODone (DESYREL) tablet 50 mg  50 mg Oral At Bedtime PRN Emy Garcia, LESLIE CNP                 Review of Systems:     A complete review of systems was performed and is negative except as noted in the HPI           Physical Exam:   Temp: 97.6  F (36.4  C) Temp src: Oral BP: 133/66 Pulse: 97   Resp: 16 SpO2: 96 % O2 Device: None (Room air)    Wt:   Wt Readings from Last 2 Encounters:   08/06/24 (!) 151.8 kg (334 lb 10.5 oz)   07/09/24 (!) 151.2 kg (333 lb 4.8 oz)        General: 74 year old male lying in bed in NAD. Appears comfortable.  Answers appropriately. +Obese body habitus.    HEENT: Head is AT/NC. Sclera anicteric.   Lungs: No increased work of breathing, speaking in full sentences, equal chest rise. On Room Air.   Heart: Regular rate. Peripheral perfusion intact.  Abdomen: Soft, non-tender, non-distended.  No peritoneal signs.  Extremities: WWP.  Musculoskeletal: No gross deformity.  Skin: No jaundice or rash on exposed skin.  Neurologic: Grossly non-focal.  CN 2-12 grossly intact.   Mental status/Psych: A&O. Asks/answers questions appropriately. Pleasant, interactive.             Data:   LAB WORK:    [facility]  Recent Labs   Lab 08/07/24 0912 08/07/24  0533 08/06/24  2122 08/06/24  1557 08/06/24  0747 08/06/24  0006 08/05/24  0757 08/05/24  0629 08/04/24  1832 08/04/24  1549 08/04/24  0840  08/04/24 0451   NA  --  133*  --   --   --  134*  --  131*  --   --   --  132*   POTASSIUM  --  4.0  --   --   --  4.2  --  4.2  --  4.7  --  4.1   CHLORIDE  --  101  --   --   --  100  --  98  --   --   --  99   VISH  --  8.4*  --   --   --  8.5*  --  8.5*  --   --   --  8.4*   CO2  --  20*  --   --   --  26  --  23  --   --   --  24   BUN  --  35.0*  --   --   --  42.8*  --  45.4*  --   --   --  47.6*   CR  --  1.23*  --   --   --  1.36*  --  1.32*  --   --   --  1.31*   * 145* 170* 232*   < > 163*   < > 171*   < >  --    < > 158*    < > = values in this interval not displayed.     CBC  Recent Labs   Lab 08/07/24 0927 08/07/24 0533 08/06/24 0535 08/05/24 0629 08/04/24 0451   WBC  --  5.3 5.5 5.6 5.4   RBC  --  2.63* 2.86* 2.96* 2.96*   HGB 8.2* 7.7* 8.5* 8.8* 8.9*   HCT  --  25.2* 27.4* 28.1* 28.2*   MCV  --  96 96 95 95   MCH  --  29.3 29.7 29.7 30.1   MCHC  --  30.6* 31.0* 31.3* 31.6   RDW  --  18.6* 18.7* 19.3* 19.7*   PLT  --  140* 153 158 130*     INR  Recent Labs   Lab 08/07/24 0533 08/06/24 0535 08/05/24 0629 08/04/24 0451   INR 1.14 1.25* 1.11 1.14     LFTs  Recent Labs   Lab 08/07/24 0533 08/06/24  0006 08/05/24 0629 08/04/24 0451   ALKPHOS 83 85 90 82   AST 19 15 20 16   ALT <5 5 6 <5   BILITOTAL 0.4 0.4 0.4 0.5   PROTTOTAL 5.2* 5.3* 5.6* 5.3*   ALBUMIN 2.7* 3.0* 3.0* 3.0*      PANCNo lab results found in last 7 days.    IMAGING:  -- No abdominal imaging this admission.         =======================================================================

## 2024-08-07 NOTE — PLAN OF CARE
Client transferred from  CVICU around 2045.   D: pt  presents to [facility] for CORONARY ARTERY BYPASS GRAFT x2 (LIMA-LAD, SVG-LPL), Pulmonary vein isolation with RF ablation, LIGATION, LEFT ATRIAL APPENDAGE with PMH of  CAD sp coronary angioplasty w/stent placement of circumflex coronary artery in 2002, HTN, HLD, SVT, HERRERA, CKD, T2DM, OA, prostate cancer.     I: Monitored vitals and assessed pt status.   Changes during this shift:   K+. Mg2+, Phos Protocols. Have an episode of incontinent stool with urgency.   Patient had a large liquid bloody stools. Provider notified. Report given to Nurse and asked to folllow up with provider.   Running: Amiodarone @0.5mg/min  PRN Med:      Vitals:  Temp: 97.9-98.1*F, HR: 80s - 110s.       A:   Neuro: A&Ox4, Denies Headache, dizziness, calls appropriately   Cardiac: Afib 80s -110s  Afebrile, VSS.  Denies chest pain.   Respiratory: sating >95 ON RA. Uses CPAP at night. denies SOB. LS clear, diminished at bases.   Diet/appetite: cardiac Diet.  GI/:  BM x1 with maroon blood clots this shift. Another stool this morning with urgency incontinence. Denies abdominal pain. Good urine output.   Activity:  Up with assist x1-2, uses walker  Pain: Denies  Skin: No new deficits  LDA's: L&R PIVs    Plan: Continue with POC.  Notify primary team with changes.   Goal Outcome Evaluation:    Problem: Cardiovascular Surgery  Goal: Improved Activity Tolerance  Intervention: Optimize Tolerance for Activity  Recent Flowsheet Documentation  Taken 8/6/2024 2105 by Ra Haile, RN  Environmental Support:   calm environment promoted   rest periods encouraged   personal routine supported     Problem: Cardiovascular Surgery  Goal: Effective Cardiac Function  Outcome: Progressing     Problem: Cardiovascular Surgery  Goal: Optimal Cerebral Tissue Perfusion  Outcome: Progressing  Intervention: Protect and Optimize Cerebral Perfusion  Recent Flowsheet Documentation  Taken 8/6/2024 2105 by Ra Haile,  RN  Sensory Stimulation Regulation:   care clustered   lighting decreased   quiet environment promoted  Glycemic Management:   blood glucose monitored   supplemental insulin given  Head of Bed (HOB) Positioning: HOB at 30-45 degrees     Problem: Cardiovascular Surgery  Goal: Fluid and Electrolyte Balance  Outcome: Progressing     Problem: Cardiovascular Surgery  Goal: Blood Glucose Level Within Targeted Range  Outcome: Progressing  Intervention: Optimize Glycemic Control  Recent Flowsheet Documentation  Taken 8/6/2024 2105 by Ra Haile, RN  Glycemic Management:   blood glucose monitored   supplemental insulin given

## 2024-08-07 NOTE — PROGRESS NOTES
Post Fall Assessment Note    S: Patient had a(n): witnessed, assisted fall.    B: Patient's orientation/mental status at time of fall:  awake, alert, and oriented.   Medications administered within 8 hours of fall:    PCA/Opiates:  No    Hypnotics:  No    Psychotropics: No    Antihypertensives:  Yes    Sedatives:  No   Location of fall:  Hallway from the bathroom    A: Type of injury from fall:  None   Patient status:  Stable   Patient was lifted from fall event:  Equipment used to assist   Interventions:  VS, assessment   MD notified:  Yes   Family member/next of kin notified:  Yes    R: Falls assessment completed in [facility]:  Yes   Care Plan updated:  Yes

## 2024-08-07 NOTE — PROGRESS NOTES
Transfer  Transferred from  CVICU Via bed  Reason for transfer: Pt inappropriate for unit  Family: Aware of transfer  Belongings:Sent with pt  Chart:Sent with pt  Medications: Meds from bin sent with pt  Report called from: 6C    Two RN skin check done with Javan.

## 2024-08-08 ENCOUNTER — APPOINTMENT (OUTPATIENT)
Dept: PHYSICAL THERAPY | Facility: CLINIC | Age: 74
DRG: 233 | End: 2024-08-08
Attending: SURGERY
Payer: MEDICARE

## 2024-08-08 ENCOUNTER — APPOINTMENT (OUTPATIENT)
Dept: OCCUPATIONAL THERAPY | Facility: CLINIC | Age: 74
DRG: 233 | End: 2024-08-08
Attending: SURGERY
Payer: MEDICARE

## 2024-08-08 ENCOUNTER — APPOINTMENT (OUTPATIENT)
Dept: GENERAL RADIOLOGY | Facility: CLINIC | Age: 74
DRG: 233 | End: 2024-08-08
Attending: PHYSICIAN ASSISTANT
Payer: MEDICARE

## 2024-08-08 LAB
ANION GAP SERPL CALCULATED.3IONS-SCNC: 9 MMOL/L (ref 7–15)
BLD PROD TYP BPU: NORMAL
BLOOD COMPONENT TYPE: NORMAL
BUN SERPL-MCNC: 40 MG/DL (ref 8–23)
CALCIUM SERPL-MCNC: 8.6 MG/DL (ref 8.8–10.4)
CHLORIDE SERPL-SCNC: 100 MMOL/L (ref 98–107)
CODING SYSTEM: NORMAL
CREAT SERPL-MCNC: 1.72 MG/DL (ref 0.67–1.17)
CROSSMATCH: NORMAL
EGFRCR SERPLBLD CKD-EPI 2021: 41 ML/MIN/1.73M2
ERYTHROCYTE [DISTWIDTH] IN BLOOD BY AUTOMATED COUNT: 18.2 % (ref 10–15)
GLUCOSE BLDC GLUCOMTR-MCNC: 166 MG/DL (ref 70–99)
GLUCOSE BLDC GLUCOMTR-MCNC: 176 MG/DL (ref 70–99)
GLUCOSE BLDC GLUCOMTR-MCNC: 178 MG/DL (ref 70–99)
GLUCOSE BLDC GLUCOMTR-MCNC: 211 MG/DL (ref 70–99)
GLUCOSE SERPL-MCNC: 166 MG/DL (ref 70–99)
HCO3 SERPL-SCNC: 24 MMOL/L (ref 22–29)
HCT VFR BLD AUTO: 26.7 % (ref 40–53)
HGB BLD-MCNC: 8.2 G/DL (ref 13.3–17.7)
HGB BLD-MCNC: 8.2 G/DL (ref 13.3–17.7)
ISSUE DATE AND TIME: NORMAL
MAGNESIUM SERPL-MCNC: 2.4 MG/DL (ref 1.7–2.3)
MCH RBC QN AUTO: 29.3 PG (ref 26.5–33)
MCHC RBC AUTO-ENTMCNC: 30.7 G/DL (ref 31.5–36.5)
MCV RBC AUTO: 95 FL (ref 78–100)
PHOSPHATE SERPL-MCNC: 4.4 MG/DL (ref 2.5–4.5)
PLATELET # BLD AUTO: 161 10E3/UL (ref 150–450)
POTASSIUM SERPL-SCNC: 4.6 MMOL/L (ref 3.4–5.3)
RBC # BLD AUTO: 2.8 10E6/UL (ref 4.4–5.9)
SODIUM SERPL-SCNC: 133 MMOL/L (ref 135–145)
UNIT ABO/RH: NORMAL
UNIT NUMBER: NORMAL
UNIT STATUS: NORMAL
UNIT TYPE ISBT: 6200
WBC # BLD AUTO: 7.2 10E3/UL (ref 4–11)

## 2024-08-08 PROCEDURE — 97535 SELF CARE MNGMENT TRAINING: CPT | Mod: GO

## 2024-08-08 PROCEDURE — 250N000013 HC RX MED GY IP 250 OP 250 PS 637

## 2024-08-08 PROCEDURE — 120N000005 HC R&B MS OVERFLOW UMMC

## 2024-08-08 PROCEDURE — P9016 RBC LEUKOCYTES REDUCED: HCPCS | Performed by: PHYSICIAN ASSISTANT

## 2024-08-08 PROCEDURE — 999N000157 HC STATISTIC RCP TIME EA 10 MIN

## 2024-08-08 PROCEDURE — 250N000013 HC RX MED GY IP 250 OP 250 PS 637: Performed by: PHYSICIAN ASSISTANT

## 2024-08-08 PROCEDURE — 85027 COMPLETE CBC AUTOMATED: CPT | Performed by: PHYSICIAN ASSISTANT

## 2024-08-08 PROCEDURE — 80048 BASIC METABOLIC PNL TOTAL CA: CPT | Performed by: PHYSICIAN ASSISTANT

## 2024-08-08 PROCEDURE — 71045 X-RAY EXAM CHEST 1 VIEW: CPT | Mod: 26 | Performed by: RADIOLOGY

## 2024-08-08 PROCEDURE — 97530 THERAPEUTIC ACTIVITIES: CPT | Mod: GP

## 2024-08-08 PROCEDURE — 97116 GAIT TRAINING THERAPY: CPT | Mod: GP

## 2024-08-08 PROCEDURE — 93005 ELECTROCARDIOGRAM TRACING: CPT

## 2024-08-08 PROCEDURE — 83735 ASSAY OF MAGNESIUM: CPT | Performed by: PHYSICIAN ASSISTANT

## 2024-08-08 PROCEDURE — 250N000012 HC RX MED GY IP 250 OP 636 PS 637: Performed by: PHYSICIAN ASSISTANT

## 2024-08-08 PROCEDURE — 84100 ASSAY OF PHOSPHORUS: CPT | Performed by: PHYSICIAN ASSISTANT

## 2024-08-08 PROCEDURE — 71045 X-RAY EXAM CHEST 1 VIEW: CPT

## 2024-08-08 PROCEDURE — 94660 CPAP INITIATION&MGMT: CPT

## 2024-08-08 PROCEDURE — 93010 ELECTROCARDIOGRAM REPORT: CPT | Performed by: INTERNAL MEDICINE

## 2024-08-08 RX ORDER — TORSEMIDE 10 MG/1
10 TABLET ORAL DAILY
Status: DISCONTINUED | OUTPATIENT
Start: 2024-08-08 | End: 2024-08-09

## 2024-08-08 RX ADMIN — Medication 5 MG: at 20:04

## 2024-08-08 RX ADMIN — SALINE NASAL SPRAY 1 SPRAY: 1.5 SOLUTION NASAL at 11:16

## 2024-08-08 RX ADMIN — EMPAGLIFLOZIN 10 MG: 10 TABLET, FILM COATED ORAL at 08:15

## 2024-08-08 RX ADMIN — SENNOSIDES AND DOCUSATE SODIUM 1 TABLET: 8.6; 5 TABLET ORAL at 08:15

## 2024-08-08 RX ADMIN — SUCRALFATE 2 G: 1 SUSPENSION ORAL at 16:39

## 2024-08-08 RX ADMIN — INSULIN ASPART 2 UNITS: 100 INJECTION, SOLUTION INTRAVENOUS; SUBCUTANEOUS at 13:25

## 2024-08-08 RX ADMIN — POLYETHYLENE GLYCOL 3350 17 G: 17 POWDER, FOR SOLUTION ORAL at 08:16

## 2024-08-08 RX ADMIN — PANTOPRAZOLE SODIUM 40 MG: 40 TABLET, DELAYED RELEASE ORAL at 08:15

## 2024-08-08 RX ADMIN — AMIODARONE HYDROCHLORIDE 400 MG: 200 TABLET ORAL at 08:15

## 2024-08-08 RX ADMIN — FLUTICASONE PROPIONATE 1 SPRAY: 50 SPRAY, METERED NASAL at 22:07

## 2024-08-08 RX ADMIN — OXYBUTYNIN CHLORIDE 10 MG: 10 TABLET, EXTENDED RELEASE ORAL at 08:15

## 2024-08-08 RX ADMIN — SALINE NASAL SPRAY 1 SPRAY: 1.5 SOLUTION NASAL at 20:09

## 2024-08-08 RX ADMIN — PANTOPRAZOLE SODIUM 40 MG: 40 TABLET, DELAYED RELEASE ORAL at 16:38

## 2024-08-08 RX ADMIN — INSULIN ASPART 2 UNITS: 100 INJECTION, SOLUTION INTRAVENOUS; SUBCUTANEOUS at 09:22

## 2024-08-08 RX ADMIN — ROSUVASTATIN CALCIUM 20 MG: 20 TABLET, FILM COATED ORAL at 08:15

## 2024-08-08 RX ADMIN — INSULIN ASPART 3 UNITS: 100 INJECTION, SOLUTION INTRAVENOUS; SUBCUTANEOUS at 18:06

## 2024-08-08 RX ADMIN — TAMSULOSIN HYDROCHLORIDE 0.4 MG: 0.4 CAPSULE ORAL at 08:16

## 2024-08-08 RX ADMIN — SUCRALFATE 2 G: 1 SUSPENSION ORAL at 09:52

## 2024-08-08 ASSESSMENT — ACTIVITIES OF DAILY LIVING (ADL)
ADLS_ACUITY_SCORE: 37
ADLS_ACUITY_SCORE: 37
ADLS_ACUITY_SCORE: 38
ADLS_ACUITY_SCORE: 37
ADLS_ACUITY_SCORE: 38
ADLS_ACUITY_SCORE: 38
ADLS_ACUITY_SCORE: 37
ADLS_ACUITY_SCORE: 38
ADLS_ACUITY_SCORE: 37
ADLS_ACUITY_SCORE: 38
ADLS_ACUITY_SCORE: 37
ADLS_ACUITY_SCORE: 38
ADLS_ACUITY_SCORE: 37

## 2024-08-08 NOTE — PLAN OF CARE
Problem: Fall Injury Risk  Goal: Absence of Fall and Fall-Related Injury  Outcome: Progressing  Intervention: Identify and Manage Contributors  Recent Flowsheet Documentation  Taken 8/7/2024 1923 by Ky Edmond RN  Medication Review/Management:   medications reviewed   high-risk medications identified  Taken 8/7/2024 1600 by Ky Edmond RN  Medication Review/Management:   medications reviewed   high-risk medications identified  Taken 8/7/2024 0917 by Ky Edmond RN  Medication Review/Management:   medications reviewed   high-risk medications identified  Intervention: Promote Injury-Free Environment  Recent Flowsheet Documentation  Taken 8/7/2024 1923 by Ky Edmond RN  Safety Promotion/Fall Prevention:   activity supervised   assistive device/personal items within reach   clutter free environment maintained   lighting adjusted   nonskid shoes/slippers when out of bed   room door open   room near nurse's station   room organization consistent   safety round/check completed   supervised activity  Taken 8/7/2024 1600 by Ky Edmond RN  Safety Promotion/Fall Prevention:   activity supervised   assistive device/personal items within reach   clutter free environment maintained   lighting adjusted   nonskid shoes/slippers when out of bed   room door open   room near nurse's station   room organization consistent   safety round/check completed   supervised activity  Taken 8/7/2024 0917 by Ky Edmond RN  Safety Promotion/Fall Prevention:   activity supervised   assistive device/personal items within reach   clutter free environment maintained   lighting adjusted   nonskid shoes/slippers when out of bed   room door open   room near nurse's station   room organization consistent   safety round/check completed   supervised activity   Goal Outcome Evaluation:

## 2024-08-08 NOTE — PLAN OF CARE
[id number]:    Neuro: A/Ox4.  Cardiac: Junctional with HR 40-50's VSS.   Respiratory: O2 sats 100% on CPAP 35% FiO2  GI/: Voiding on commode, No BM  Diet/appetite: Mod Cons Cho,    Activity:  Up with assist of 2, had fall yesterday due to drop in HR  Pain: Denies pain  Skin: Sternal inc TALAT, old CT sites CDI, Leg inc CDI  LDA's: PICC- SL'd, PIV x 2 SL'd    Plan: Dilt held due to low HR. Had one unit blood last PM- recheck Hgb 8.3 at 2200, 8.2 this am. Continue with POC. Notify primary team with changes.

## 2024-08-08 NOTE — PROGRESS NOTES
Cardiovascular Surgery Progress Note  08/08/2024         Assessment and Plan:     Jorje Hutchinson is a 74 year old male with a PMH of CAD sp coronary angioplasty w/stent placement of circumflex coronary artery in 2002, HTN, HLD, SVT, chronic afib (on apixaban), HERRERA on home CPAP, CKD, T2DM, OA, prostate cancer, and worsening morbid obesity. He was referred to Dr. Wilson in the outpatient setting after a coronary angiogram showed multi-vessel disease. Patient is now s/p planned CABG x2 and PVI and LAAL with sternal plating on 7/30/24 with Dr. Deniz Wilson.    Cardiovascular:   Hx of CAD - s/p CABG x2 7/30/24  Hx of chronic afib s/p bilateral PVI with radiofrequency ablation with Atricure device (modified MAZE procedure) and left atrial appendage exclusion 7/30/24  S/p Sternal closure with sternal plates   Hx of coronary angioplasty with stent to circumflex in 2002  HTN  HLD  SVT  Junctional rhythm in the 50s since yesterday afternoon, HD stable. MAPS 70-80s  Most recent echo showed LV EF 60-65%  -  mg daily  - PTA rosuvastatin 20 mg daily   - low dose metoprolol tartrate started 8/7, converted out of AF to junctional rhythm in the 50s, metoprolol held 8/7   - PTA PTA dose 120 mg daily- not resumed, short acting dilt 30 mg Q8h- held 8/8 due to junctional rhythm in the 50s  - EP consulted 8/6 for afib management, will reach out prior to discharge for final recs. Patient should follow up with EP as an outpatient  - PTA meds on hold: Plavix, losartan, sotalol, spironolactone, Eliquis   - Diuresis as below    Chest tubes and TPW removed in the ICU    Pulmonary:  HERRERA with home CPAP  Extubated POD 0. Now saturating well on RA  - Supplemental O2 PRN to keep sats > 92%. Wean off as tolerated.  - Pulm hygiene, IS, activity and deep breathing  - CXR demonstrating improvement of right opacities/effusion on 8/5  - Continue home CPAP at night     Neurology / Psych:  Acute post-operative pain   Pain well controlled with  current regimen:  - Acetaminophen PRN, robaxin PRN  Insomnia  - Melatonin 5 mg HS  - Trazodone PRN     / Renal / Fluid / Electrolytes:  ULISES on CKD     BL creat ~ 1.42, most recent creatinine 1.36; UOP unmeasured voids  FLUID STATUS: Pre-op weight 333 lb, weight today 332; I/O past 24 hours: inaccurate  - Diuresis resume PTA torsemide 20 mg daily- held 8/8  - Replete lytes per protocol  - Strict I/O, daily weights  - Avoid/limit nephrotoxins as able  - Restarted PTA tamsulosin 0.4 mg (on 8/1/2024) and Oxybutynin ER 10 mg every day (on 8/3/2024)     GI / Nutrition:   Concern for protein-calorie malnutrition  Constipation   Chronic 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  Acute on chronic rectal bleeding   - Pre-op saw GI. Significant bleeding from rectum 8/7 per patient and nurse. Hemoglobin dropped. GI consulted, continue bowel regimen. Sucralfate enema ordered per GI . No bleeding today per patient  - Tolerating regular diet  - Continue bowel regimen, last BM 8/7  - metamucil added 8/5 for diarrhea- discontinued 8/7    Endocrine:  Stress induced hyperglycemia   T2DM, insulin dependent   Hgb A1c 6.7  - Initially managed on insulin drip postop, transitioned to high resistance sliding scale  - goal BG <180 for optimal healing  - PTA Jardiance resumed 8/7  - insulin glargine 20 mg AM (PTA dose 38 units every morning), slowly increasing back up to home regimen    Infectious Disease:  Stress induced leukocytosis  WBC 7.2, remains afebrile, no signs or symptoms of infection  - Completed perioperative antibiotics  - Continue to monitor fever curve, CBC    Hematology:   Acute blood loss anemia and thrombocytopenia  Hgb 8.2; Plt 153  - s/p transfusion with 1 unit pRBCs on 8/1 with appropriate response   - 1 U RBC given overnight 8/7 for Hgb 7.6. Another unit pRBC ordered 8/8 per Dr. Wilson  - CBC daily    Anticoagulation:   -  mg only - held  - No plan for AC or PTA plavix per Dr. Wilson due to  "bleeding    MSK/Skin:  Sternotomy  Surgical incision  Chronic left knee pain  - Sternal precautions  - Incisional cares per protocol  - PTA allopurinol 100 mg BID on hold     Prophylaxis:   - Stress ulcer prophylaxis: Pantoprazole 40 mg daily for 30 days  - DVT prophylaxis: Subcutaneous heparin, SCD    Disposition:   - Transferred to  on 8/6  - Therapies recommending discharge to home when ready  -  Medically Ready for Discharge: Anticipated in 2-4 Days    Discussed with Dr. Steve Joaquin PAANGIE   Cardiothoracic Surgery   Pager #[phone]  August 8, 2024 at 7:34 AM        Clinically Significant Risk Factors              # Hypoalbuminemia: Lowest albumin = 2.7 g/dL at 8/7/2024  5:33 AM, will monitor as appropriate                 # DMII: A1C = 6.7 % (Ref range: <5.7 %) within past 6 months   # Severe Obesity: Estimated body mass index is 49.88 kg/m  as calculated from the following:    Height as of this encounter: 1.74 m (5' 8.5\").    Weight as of this encounter: 151 kg (332 lb 14.4 oz).        # Financial/Environmental Concerns: none   # History of CABG: noted on surgical history           Interval History:     No overnight events.  States pain is well managed on current regimen. Slept well overnight.  Tolerating diet, is passing flatus, + BM. 1 UpRBC overnight for hgb <8. Another unit ordered this morning per Dr. Wilson.  Dizzy yesterday after converted from rate controlled AF to junctional rhythm in the 50s. BPs stable. Continue to hold BB/CCB but continue Amiodarone.  No nausea or vomiting.  Breathing well without complaints.   Working with therapies and ambulating in halls with assistance.   Denies chest pain, palpitations, dizziness, syncopal symptoms, fevers, chills, myalgias, or sternal popping/clicking.         Physical Exam:     /49 (BP Location: Left arm)   Pulse 55   Temp 97.5  F (36.4  C) (Oral)   Resp 18   Ht 1.74 m (5' 8.5\")   Wt (!) 151 kg (332 lb 14.4 oz)   SpO2 97%   BMI 49.88 " kg/m      Gen: A&Ox4, NAD  Neuro: no focal deficits   CV: junctional 50s, no murmurs, rubs or gallops  Pulm: CTA, no wheezing or rhonchi, normal breathing on RA  Abd: nondistended, normal BS, soft, nontender  Ext: mild peripheral edema  Incision: clean, dry, intact, no erythema, sternum stable  Tubes/drain sites: dressing clean and dry         Data:    Imaging:  reviewed recent imaging, no acute concerns    Recent Results (from the past 24 hour(s))   XR Chest 1 View    Narrative    EXAM: X-ray chest portable one view  8/7/2024      HISTORY: PICC line placement.    COMPARISON: Chest x-ray 8/5/2024, chest x-ray 8/4/2024    FINDINGS: Single view of the chest. Trachea is midline. No  pneumothorax. Cardiomediastinal silhouette is unchanged in appearance.  Stable trace right-sided pleural effusion. Mild streaky bibasilar  opacities likely representing atelectasis, mildly improved. New  right-sided PICC line with the tip projecting over the proximal right  atrium. Postsurgical changes of the chest are visualized including  sternotomy wires, sternal plates, and left atrial appendage clipping,  stable. No acute bony abnormality. Visualized upper abdomen is grossly  unremarkable.      Impression    IMPRESSION:   1. Right-sided PICC line with tip projecting over the upper right  atrium.  2. Mild improvement of bibasilar atelectasis.  3. Stable postsurgical changes of the chest.    I have personally reviewed the examination and initial interpretation  and I agree with the findings.    ARMIDA GONZALEZ MD         SYSTEM ID:  [id number]   XR Abdomen 1 View    Narrative    EXAMINATION:  XR ABDOMEN 1 VIEW 8/7/2024     COMPARISON: Chest x-ray dated 08/05/2024, 08/07/2024.    HISTORY: 74-year-old male status post coronary artery bypass graft  7/30/2024. ICU stepdown 8/6/2024. eval stool burden.    TECHNIQUE: Two frontal supine views of the abdomen.    FINDINGS: No abnormally dilated air-filled loops of bowel.  Cholelithiasis in the  right upper quadrant. Mild colonic stool burden  throughout the colon. Multilevel degenerative change of the  thoracolumbar spine.       Impression    IMPRESSION:   Non-obstructive bowel gas pattern.    FINDINGS: The children.    I have personally reviewed the examination and initial interpretation  and I agree with the findings.    BECKY ESCAMILLA DO         SYSTEM ID:  [id number]        Labs:  Recent Labs   Lab 08/08/24  0451 08/07/24  2200 08/07/24  2104 08/07/24  1736 08/07/24  1721 08/07/24  0912 08/07/24  0533 08/06/24  0747 08/06/24  0535 08/06/24  0006 08/05/24  0757 08/05/24  0629   WBC 7.2  --   --   --   --   --  5.3  --  5.5  --   --  5.6   HGB 8.2*  8.2* 8.3*  --  7.6*  --    < > 7.7*  --  8.5*  --   --  8.8*   MCV 95  --   --   --   --   --  96  --  96  --   --  95     --   --   --   --   --  140*  --  153  --   --  158   INR  --   --   --   --   --   --  1.14  --  1.25*  --   --  1.11   *  --   --   --   --   --  133*  --   --  134*  --  131*   POTASSIUM 4.6  --   --   --   --   --  4.0  --   --  4.2  --  4.2   CHLORIDE 100  --   --   --   --   --  101  --   --  100  --  98   CO2 24  --   --   --   --   --  20*  --   --  26  --  23   BUN 40.0*  --   --   --   --   --  35.0*  --   --  42.8*  --  45.4*   CR 1.72*  --   --   --   --   --  1.23*  --   --  1.36*  --  1.32*   ANIONGAP 9  --   --   --   --   --  12  --   --  8  --  10   VISH 8.6*  --   --   --   --   --  8.4*  --   --  8.5*  --  8.5*   *  --  198*  --  205*   < > 145*   < >  --  163*   < > 171*   ALBUMIN  --   --   --   --   --   --  2.7*  --   --  3.0*  --  3.0*   PROTTOTAL  --   --   --   --   --   --  5.2*  --   --  5.3*  --  5.6*   BILITOTAL  --   --   --   --   --   --  0.4  --   --  0.4  --  0.4   ALKPHOS  --   --   --   --   --   --  83  --   --  85  --  90   ALT  --   --   --   --   --   --  <5  --   --  5  --  6   AST  --   --   --   --   --   --  19  --   --  15  --  20    < > = values in this interval not  displayed.      GLUCOSE:   Recent Labs   Lab 08/08/24  0451 08/07/24  2104 08/07/24  1721 08/07/24  1113 08/07/24  0912 08/07/24  0533   * 198* 205* 147* 150* 145*

## 2024-08-08 NOTE — PLAN OF CARE
D: Admitted s/p CABG x2 w/sternal plating w/Dr. Wilson  [facility] of CAD s/op coronary angioplasty w/stent placement of circumflex artery, HTN, HLD, SVT, chronic A-fib on Apixaban, CKD, T2DM, OA, prostate cancer, obesity     I: Monitored vitals and assessed pt status.     A: A0x4. Afebrile. VSS. HRs 40-50s. Junctional rhythm. Sats >92% on RA. Pt denies any chest pain/palpitations, nausea, dizziness, or chills. Sternal incision WNL. Left leg incisional site WNL. Hgb 8.2, 1 unit blood given, no adverse reactions. BM x2 this shift, no blood in stools. BGs before meals. Pt on mod carb diet.  Pt has done well w/assist x1 w/walker. Pt's wife Cassie at bedside.       P: Continue to monitor pt status and notify CVTS treatment team with any changes or concerns.     Goal Outcome Evaluation:     Plan of Care Reviewed With: patient, spouse    Overall Patient Progress: no change     Clear

## 2024-08-08 NOTE — PROGRESS NOTES
Care Management Follow Up    Length of Stay (days): 9    Expected Discharge Date: 08/10/2024 or 8/11(per DR Wilson per pt)  Sternal precautions  Recent GIB     Concerns to be Addressed:       Patient plan of care discussed at interdisciplinary rounds: Yes    Anticipated Discharge Disposition: Transitional Care: Pt refuses and will go home with wife. Daughter can assist.     Anticipated Discharge Services: Other (see comment) (Rehab)PT/OT recs HHPT/OT  Pt given choices of 5 local  agencies and 1st choice is [email or web address] [facility] Ph: [phone] #4 Fax: [phone] I called Afua and she requested I fax info--done @ 3:12pm--they can possibly take him with a mid week start: she is to call back____  2nd choice is: [facility]  Ph: [phone] if choice #1 cannot accept.      Needs follow up in 1 week with cardiologist: Dr Mendel Parikh [place], [place]  Ph: [phone]  Fax: [phone]  [facility] [place]      [address], [place] [address] [phone]    I called Kaley and Dr Parikh is on vacation for 2 weeks so she scheduled pt with:  Alycia SALGADO on: Wednesday, 8/21/24  Check In: 2:40pm  Alycia will then set pt up with a Dr Parikh appointment and she can order OP CR for when HHPT/OT is done.      Anticipated Discharge DME: see below    Pt wants to use: [facility] [place], [place]  Ph: [phone] Fax: [phone] I called and per Tayla will be private pay:  Bariatric shower chair without arms $60  Bariatric commode with arms $80  Semi Electric Hospital bed $175/month--pt wants  Depends size 2X--Tayla doesn't know cost, they outsource.    Wife to measure from ground to running board on Expedition for PT    Wife wants a stool to assist pt to get into Vehicle and Medical Supply companies don't have them--On [employer]  found:  [employer] Mobility One Step Stool, Heavy Duty Indoor/Outdoor Non-Slip Step Platform Assistive Devices for Adults and  Elderly, Portable Standing Supports for Cars, Bed, Door, Stair or Bathroom, Black-Gray  Visit the [employer]  4.7 4.7 out of 5 stars    708 ratings  Up to 500 lbs  $29.99$29.99  I gave to wife to order today.      Patient/family educated on Medicare website which has current facility and service quality ratings: yes  Education Provided on the Discharge Plan: Yes  Patient/Family in Agreement with the Plan: yes    Referrals Placed by CM/SW:  Home Care[facility]  Private pay costs discussed:  for semi electric hospital bed/step stool/toilet brandy  Need to inform pt that all the other equipment is private pay too    Additional Information: Pt informed I am off tomorrow and RN JOLYNN Hernandez will cover. PT aware they need to look over equipment list.      Martha Salinas RN

## 2024-08-08 NOTE — PROGRESS NOTES
"/49   Pulse (!) 49   Temp 98  F (36.7  C) (Oral)   Resp 18   Ht 1.74 m (5' 8.5\")   Wt (!) 151.8 kg (334 lb 10.5 oz)   SpO2 95%   BMI 50.14 kg/m        .Neuro: A&Ox4.   Cardiac: Afebrile, VSS.   Respiratory: RA , CPAP@ night  GI/: Voiding spontaneously.multiple loose bloody stools BM this shift.   Diet/appetite: Tolerating diet. Denies nausea   Activity: Up with A-2  Pain:Tylenol given x1  Skin: No new deficits noted.  Lines:PICC x2, PIV x2  Drains: None    No new complaints.Will continue to monitor and follow plan of care.    "

## 2024-08-09 ENCOUNTER — APPOINTMENT (OUTPATIENT)
Dept: OCCUPATIONAL THERAPY | Facility: CLINIC | Age: 74
DRG: 233 | End: 2024-08-09
Attending: SURGERY
Payer: MEDICARE

## 2024-08-09 ENCOUNTER — APPOINTMENT (OUTPATIENT)
Dept: PHYSICAL THERAPY | Facility: CLINIC | Age: 74
DRG: 233 | End: 2024-08-09
Attending: SURGERY
Payer: MEDICARE

## 2024-08-09 ENCOUNTER — APPOINTMENT (OUTPATIENT)
Dept: CARDIOLOGY | Facility: CLINIC | Age: 74
DRG: 233 | End: 2024-08-09
Attending: NURSE PRACTITIONER
Payer: MEDICARE

## 2024-08-09 LAB
ANION GAP SERPL CALCULATED.3IONS-SCNC: 11 MMOL/L (ref 7–15)
ATRIAL RATE - MUSE: NORMAL BPM
BUN SERPL-MCNC: 38.1 MG/DL (ref 8–23)
CALCIUM SERPL-MCNC: 8.4 MG/DL (ref 8.8–10.4)
CHLORIDE SERPL-SCNC: 97 MMOL/L (ref 98–107)
CREAT SERPL-MCNC: 1.76 MG/DL (ref 0.67–1.17)
DIASTOLIC BLOOD PRESSURE - MUSE: NORMAL MMHG
EGFRCR SERPLBLD CKD-EPI 2021: 40 ML/MIN/1.73M2
ERYTHROCYTE [DISTWIDTH] IN BLOOD BY AUTOMATED COUNT: 18 % (ref 10–15)
GLUCOSE BLDC GLUCOMTR-MCNC: 161 MG/DL (ref 70–99)
GLUCOSE BLDC GLUCOMTR-MCNC: 168 MG/DL (ref 70–99)
GLUCOSE BLDC GLUCOMTR-MCNC: 180 MG/DL (ref 70–99)
GLUCOSE BLDC GLUCOMTR-MCNC: 209 MG/DL (ref 70–99)
GLUCOSE SERPL-MCNC: 153 MG/DL (ref 70–99)
HCO3 SERPL-SCNC: 23 MMOL/L (ref 22–29)
HCT VFR BLD AUTO: 28 % (ref 40–53)
HGB BLD-MCNC: 9 G/DL (ref 13.3–17.7)
INTERPRETATION ECG - MUSE: NORMAL
MAGNESIUM SERPL-MCNC: 2.4 MG/DL (ref 1.7–2.3)
MCH RBC QN AUTO: 29.8 PG (ref 26.5–33)
MCHC RBC AUTO-ENTMCNC: 32.1 G/DL (ref 31.5–36.5)
MCV RBC AUTO: 93 FL (ref 78–100)
P AXIS - MUSE: NORMAL DEGREES
PLATELET # BLD AUTO: 153 10E3/UL (ref 150–450)
POTASSIUM SERPL-SCNC: 4.1 MMOL/L (ref 3.4–5.3)
PR INTERVAL - MUSE: NORMAL MS
QRS DURATION - MUSE: 132 MS
QT - MUSE: 570 MS
QTC - MUSE: 519 MS
R AXIS - MUSE: 45 DEGREES
RBC # BLD AUTO: 3.02 10E6/UL (ref 4.4–5.9)
SODIUM SERPL-SCNC: 131 MMOL/L (ref 135–145)
SYSTOLIC BLOOD PRESSURE - MUSE: NORMAL MMHG
T AXIS - MUSE: 9 DEGREES
VENTRICULAR RATE- MUSE: 50 BPM
WBC # BLD AUTO: 6.4 10E3/UL (ref 4–11)

## 2024-08-09 PROCEDURE — 97530 THERAPEUTIC ACTIVITIES: CPT | Mod: GO

## 2024-08-09 PROCEDURE — 250N000013 HC RX MED GY IP 250 OP 250 PS 637

## 2024-08-09 PROCEDURE — 120N000005 HC R&B MS OVERFLOW UMMC

## 2024-08-09 PROCEDURE — 250N000013 HC RX MED GY IP 250 OP 250 PS 637: Performed by: PHYSICIAN ASSISTANT

## 2024-08-09 PROCEDURE — 83735 ASSAY OF MAGNESIUM: CPT | Performed by: PHYSICIAN ASSISTANT

## 2024-08-09 PROCEDURE — 93306 TTE W/DOPPLER COMPLETE: CPT | Mod: 26 | Performed by: INTERNAL MEDICINE

## 2024-08-09 PROCEDURE — 99233 SBSQ HOSP IP/OBS HIGH 50: CPT | Mod: FS

## 2024-08-09 PROCEDURE — 999N000208 ECHOCARDIOGRAM COMPLETE

## 2024-08-09 PROCEDURE — 97530 THERAPEUTIC ACTIVITIES: CPT | Mod: GP

## 2024-08-09 PROCEDURE — 80048 BASIC METABOLIC PNL TOTAL CA: CPT | Performed by: PHYSICIAN ASSISTANT

## 2024-08-09 PROCEDURE — 97116 GAIT TRAINING THERAPY: CPT | Mod: GP

## 2024-08-09 PROCEDURE — 255N000002 HC RX 255 OP 636: Performed by: INTERNAL MEDICINE

## 2024-08-09 PROCEDURE — 250N000012 HC RX MED GY IP 250 OP 636 PS 637: Performed by: PHYSICIAN ASSISTANT

## 2024-08-09 PROCEDURE — 999N000157 HC STATISTIC RCP TIME EA 10 MIN

## 2024-08-09 PROCEDURE — 250N000013 HC RX MED GY IP 250 OP 250 PS 637: Performed by: NURSE PRACTITIONER

## 2024-08-09 PROCEDURE — 250N000013 HC RX MED GY IP 250 OP 250 PS 637: Performed by: SURGERY

## 2024-08-09 RX ORDER — PANTOPRAZOLE SODIUM 40 MG/1
40 TABLET, DELAYED RELEASE ORAL
Status: DISCONTINUED | OUTPATIENT
Start: 2024-08-10 | End: 2024-08-10 | Stop reason: HOSPADM

## 2024-08-09 RX ORDER — ASPIRIN 81 MG/1
81 TABLET, CHEWABLE ORAL DAILY
Status: DISCONTINUED | OUTPATIENT
Start: 2024-08-09 | End: 2024-08-10 | Stop reason: HOSPADM

## 2024-08-09 RX ORDER — TORSEMIDE 10 MG/1
10 TABLET ORAL DAILY
Status: DISCONTINUED | OUTPATIENT
Start: 2024-08-09 | End: 2024-08-10

## 2024-08-09 RX ADMIN — INSULIN ASPART 2 UNITS: 100 INJECTION, SOLUTION INTRAVENOUS; SUBCUTANEOUS at 17:21

## 2024-08-09 RX ADMIN — SENNOSIDES AND DOCUSATE SODIUM 1 TABLET: 8.6; 5 TABLET ORAL at 08:07

## 2024-08-09 RX ADMIN — INSULIN ASPART 3 UNITS: 100 INJECTION, SOLUTION INTRAVENOUS; SUBCUTANEOUS at 13:03

## 2024-08-09 RX ADMIN — OXYBUTYNIN CHLORIDE 10 MG: 10 TABLET, EXTENDED RELEASE ORAL at 08:07

## 2024-08-09 RX ADMIN — EMPAGLIFLOZIN 10 MG: 10 TABLET, FILM COATED ORAL at 08:07

## 2024-08-09 RX ADMIN — SUCRALFATE 2 G: 1 SUSPENSION ORAL at 11:07

## 2024-08-09 RX ADMIN — FLUTICASONE PROPIONATE 1 SPRAY: 50 SPRAY, METERED NASAL at 21:11

## 2024-08-09 RX ADMIN — ROSUVASTATIN CALCIUM 20 MG: 20 TABLET, FILM COATED ORAL at 08:07

## 2024-08-09 RX ADMIN — TAMSULOSIN HYDROCHLORIDE 0.4 MG: 0.4 CAPSULE ORAL at 08:08

## 2024-08-09 RX ADMIN — INSULIN GLARGINE 20 UNITS: 100 INJECTION, SOLUTION SUBCUTANEOUS at 08:14

## 2024-08-09 RX ADMIN — HUMAN ALBUMIN MICROSPHERES AND PERFLUTREN 5 ML: 10; .22 INJECTION, SOLUTION INTRAVENOUS at 11:50

## 2024-08-09 RX ADMIN — INSULIN ASPART 1 UNITS: 100 INJECTION, SOLUTION INTRAVENOUS; SUBCUTANEOUS at 08:08

## 2024-08-09 RX ADMIN — AMIODARONE HYDROCHLORIDE 400 MG: 200 TABLET ORAL at 08:06

## 2024-08-09 RX ADMIN — PANTOPRAZOLE SODIUM 40 MG: 40 TABLET, DELAYED RELEASE ORAL at 08:07

## 2024-08-09 RX ADMIN — ASPIRIN 81 MG CHEWABLE TABLET 81 MG: 81 TABLET CHEWABLE at 11:05

## 2024-08-09 RX ADMIN — Medication 5 MG: at 21:11

## 2024-08-09 RX ADMIN — TORSEMIDE 10 MG: 10 TABLET ORAL at 11:05

## 2024-08-09 RX ADMIN — SUCRALFATE 2 G: 1 SUSPENSION ORAL at 17:13

## 2024-08-09 ASSESSMENT — ACTIVITIES OF DAILY LIVING (ADL)
ADLS_ACUITY_SCORE: 39
ADLS_ACUITY_SCORE: 38
ADLS_ACUITY_SCORE: 39
ADLS_ACUITY_SCORE: 38
ADLS_ACUITY_SCORE: 39
ADLS_ACUITY_SCORE: 38
ADLS_ACUITY_SCORE: 39
ADLS_ACUITY_SCORE: 38
ADLS_ACUITY_SCORE: 39
ADLS_ACUITY_SCORE: 38
ADLS_ACUITY_SCORE: 39
ADLS_ACUITY_SCORE: 39
ADLS_ACUITY_SCORE: 38
ADLS_ACUITY_SCORE: 38

## 2024-08-09 NOTE — PROGRESS NOTES
Cardiovascular Surgery Progress Note  08/09/2024         Assessment and Plan:     Jorje Hutchinson is a 74 year old male with a PMH of CAD sp coronary angioplasty w/stent placement of circumflex coronary artery in 2002, HTN, HLD, SVT, chronic afib (on apixaban), HERRERA on home CPAP, CKD, T2DM, OA, prostate cancer, and worsening morbid obesity. He was referred to Dr. Wilson in the outpatient setting after a coronary angiogram showed multi-vessel disease. Patient is now s/p planned CABG x2 and PVI and LAAL with sternal plating on 7/30/24 with Dr. Deniz Wilson.    Cardiovascular:   Hx of CAD - s/p CABG x2 7/30/24  Hx of chronic afib s/p bilateral PVI with radiofrequency ablation with Atricure device (modified MAZE procedure) and left atrial appendage exclusion 7/30/24  S/p Sternal closure with sternal plates   Hx of coronary angioplasty with stent to circumflex in 2002  HTN  HLD  SVT  A. Fib  Junctional Rhythm   Junctional rhythm in the 50s since yesterday afternoon, HD stable. MAPS 70-80s  Most recent echo showed LV EF 60-65%. Intra-op echo unchanged post procedure.   - ASA 81 mg daily, Low dose per Dr. Wilson   - PTA rosuvastatin 20 mg daily   - low dose metoprolol tartrate started 8/7, converted out of AF to junctional rhythm in the 50s, metoprolol held 8/7   - PTA PTA dose 120 mg daily- not resumed, short acting dilt 30 mg Q8h- held 8/8 due to junctional rhythm in the 50s  - EP consulted 8/6 for afib management, followed up today to ask for disposition recs now that patient is back in A. Fib   - PTA meds on hold: Plavix, losartan, sotalol, spironolactone, Eliquis   - Diuresis as below  - Will defer anticoagulation per Dr. Wilson     Chest tubes and TPW removed in the ICU    Pulmonary:  HERRERA with home CPAP  Extubated POD 0. Now saturating well on RA  - Supplemental O2 PRN to keep sats > 92%. Wean off as tolerated.  - Pulm hygiene, IS, activity and deep breathing  - CXR demonstrating improvement of right opacities/effusion  on 8/5  - Continue home CPAP at night     Neurology / Psych:  Acute post-operative pain   Pain well controlled with current regimen:  - Acetaminophen PRN, robaxin PRN  Insomnia  - Melatonin 5 mg HS  - Trazodone PRN     / Renal / Fluid / Electrolytes:  ULISES on CKD, ULISES resolved    BL creat ~ 1.42, most recent creatinine 1.76; UOP unmeasured voids  FLUID STATUS: Pre-op weight 333 lb, weight today 317 lbs, asked RN to re-weight patient; I/O past 24 hours: inaccurate  - Diuresis resume PTA torsemide 10 mg daily, PTA dose 20 mg daily  - Replete lytes per protocol  - Strict I/O, daily weights  - Avoid/limit nephrotoxins as able  - Restarted PTA tamsulosin 0.4 mg (on 8/1/2024) and Oxybutynin ER 10 mg every day (on 8/3/2024)     GI / Nutrition:   Concern for protein-calorie malnutrition  Constipation   Chronic 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  Acute on chronic rectal bleeding   - Pre-op saw GI. Significant bleeding from rectum 8/7 per patient and nurse. Hemoglobin dropped. GI consulted, continue bowel regimen. Sucralfate enema ordered per GI . No bleeding today per patient  - Tolerating regular diet  - Continue bowel regimen, last BM 8/8  - metamucil added 8/5 for diarrhea- discontinued 8/7    Endocrine:  Stress induced hyperglycemia   T2DM, insulin dependent   Hgb A1c 6.7  - Initially managed on insulin drip postop, transitioned to high resistance sliding scale  - goal BG <180 for optimal healing  - PTA Jardiance resumed 8/7  - insulin glargine increased to 25 mg AM (PTA dose 38 units every morning), slowly increasing back up to home regimen    Infectious Disease:  Stress induced leukocytosis  WBC 6.4, remains afebrile, no signs or symptoms of infection  - Completed perioperative antibiotics  - Continue to monitor fever curve, CBC    Hematology:   Acute blood loss anemia and thrombocytopenia  Hgb 9.0; Plt 153  - s/p transfusion with 1 unit pRBCs on 8/1 with appropriate response   - 1 U RBC given overnight 8/7 for  "Hgb 7.6. Another unit pRBC ordered 8/8 per Dr. Wilosn  - CBC daily    Anticoagulation:   - ASA 81 mg only   - No plan for AC or PTA plavix per Dr. Wilson due to bleeding    MSK/Skin:  Sternotomy  Surgical incision  Chronic left knee pain  - Sternal precautions  - Incisional cares per protocol  - PTA allopurinol 100 mg BID on hold     Prophylaxis:   - Stress ulcer prophylaxis: Pantoprazole 40 mg daily for 30 days  - DVT prophylaxis: SCD    Disposition:   - Transferred to  on 8/6  - Therapies recommending discharge to home when ready  -  Medically Ready for Discharge: Anticipated Tomorrow    Discussed with Dr. Steve Charles DNP APRN CNP CCRN   Cardiovascular Surgery   Pager [phone]        Clinically Significant Risk Factors              # Hypoalbuminemia: Lowest albumin = 2.7 g/dL at 8/7/2024  5:33 AM, will monitor as appropriate                 # DMII: A1C = 6.7 % (Ref range: <5.7 %) within past 6 months   # Severe Obesity: Estimated body mass index is 47.59 kg/m  as calculated from the following:    Height as of this encounter: 1.74 m (5' 8.5\").    Weight as of this encounter: 144.1 kg (317 lb 9.6 oz).        # Financial/Environmental Concerns: none   # History of CABG: noted on surgical history           Interval History:     No overnight events.  States pain is well managed on current regimen. Slept well overnight.  Tolerating diet, is passing flatus, + BM. 1 UpRBC overnight for hgb <8. Another unit ordered this morning per Dr. Wilson.  Converted back to A. Fib with HRs in the 80s.  No nausea or vomiting.  Breathing well without complaints.   Working with therapies and ambulating in halls with assistance.   Denies chest pain, palpitations, dizziness, syncopal symptoms, fevers, chills, myalgias, or sternal popping/clicking.         Physical Exam:     /63 (BP Location: Left arm)   Pulse 89   Temp 97.5  F (36.4  C) (Oral)   Resp 20   Ht 1.74 m (5' 8.5\")   Wt (!) 151.5 kg (334 lb 1.6 oz)   SpO2 97% "   BMI 50.06 kg/m      Gen: A&Ox4, NAD  Neuro: no focal deficits   CV: junctional 50s, no murmurs, rubs or gallops  Pulm: CTA, no wheezing or rhonchi, normal breathing on RA  Abd: nondistended, normal BS, soft, nontender  Ext: mild peripheral edema  Incision: clean, dry, intact, no erythema, sternum stable  Tubes/drain sites: dressing clean and dry         Data:    Imaging:  reviewed recent imaging, no acute concerns    Recent Results (from the past 24 hour(s))   XR Chest Port 1 View    Narrative    Exam: XR CHEST PORT 1 VIEW, 8/8/2024 9:54 AM    Comparison: Chest x-ray 8/7/2024    History: post-op monitoring for effusion    Findings:  Single view of the chest. Right arm PICC tip projects over the  superior vena cava. Sternotomy wires are intact. Left atrial appendage  clip stable. Trachea is midline. Cardiomediastinal silhouette stable.  No new airspace opacities. No pneumothorax. Stable trace right-sided  pleural effusion. The visualized upper abdomen is unremarkable. No  acute osseous abnormalities.      Impression    Impression: Stable trace right-sided pleural effusion. No new airspace  disease.    I have personally reviewed the examination and initial interpretation  and I agree with the findings.    ARMIDA GONZALEZ MD         SYSTEM ID:  [id number]        Labs:  Recent Labs   Lab 08/09/24  0751 08/09/24  0324 08/08/24  2129 08/08/24  0807 08/08/24  0451 08/07/24  2200 08/07/24  0912 08/07/24  0533 08/06/24  0747 08/06/24  0535 08/06/24  0006 08/05/24  0757 08/05/24  0629   WBC  --  6.4  --   --  7.2  --   --  5.3  --  5.5  --   --  5.6   HGB  --  9.0*  --   --  8.2*  8.2* 8.3*   < > 7.7*  --  8.5*  --   --  8.8*   MCV  --  93  --   --  95  --   --  96  --  96  --   --  95   PLT  --  153  --   --  161  --   --  140*  --  153  --   --  158   INR  --   --   --   --   --   --   --  1.14  --  1.25*  --   --  1.11   NA  --  131*  --   --  133*  --   --  133*  --   --  134*  --  131*   POTASSIUM  --  4.1  --   --   4.6  --   --  4.0  --   --  4.2  --  4.2   CHLORIDE  --  97*  --   --  100  --   --  101  --   --  100  --  98   CO2  --  23  --   --  24  --   --  20*  --   --  26  --  23   BUN  --  38.1*  --   --  40.0*  --   --  35.0*  --   --  42.8*  --  45.4*   CR  --  1.76*  --   --  1.72*  --   --  1.23*  --   --  1.36*  --  1.32*   ANIONGAP  --  11  --   --  9  --   --  12  --   --  8  --  10   VISH  --  8.4*  --   --  8.6*  --   --  8.4*  --   --  8.5*  --  8.5*   * 153* 166*   < > 166*  --    < > 145*   < >  --  163*   < > 171*   ALBUMIN  --   --   --   --   --   --   --  2.7*  --   --  3.0*  --  3.0*   PROTTOTAL  --   --   --   --   --   --   --  5.2*  --   --  5.3*  --  5.6*   BILITOTAL  --   --   --   --   --   --   --  0.4  --   --  0.4  --  0.4   ALKPHOS  --   --   --   --   --   --   --  83  --   --  85  --  90   ALT  --   --   --   --   --   --   --  <5  --   --  5  --  6   AST  --   --   --   --   --   --   --  19  --   --  15  --  20    < > = values in this interval not displayed.      GLUCOSE:   Recent Labs   Lab 08/09/24  0751 08/09/24  0324 08/08/24  2129 08/08/24  1739 08/08/24  1304 08/08/24  0807   * 153* 166* 211* 176* 178*

## 2024-08-09 NOTE — PROGRESS NOTES
Electrophysiology Consult Service  Follow up Note   EP Attending: Dr. Johnson   Date of Service: 8/9/2024     HPI:   Mr. Hutchinson is a 74 year old male who has a medical history significant for CAD s/p PCI LCx 2002, SVT, PAT/AFL (CHADSVASC 5), HTN, HLD, HERRERA (uses CPAP), CKD, DM2, OA, prostate CA s/p radiation c/b proctitis, GIB, and obesity.   He had a prior history of CAD and had prior PCI to LCx in 2002. He also has a history of AT/AFL and had been on Sotalol and Diltiazem as well as Eliquis for stroke prophylaxis. He also occasionally take PRN short acting diltiazem with palpitations. He then developed significant GALLARDO and had a positive stress test. Subsequent coronary angiogram showed 70% occulsion of the proximal LAD, 60% distal LAD, 30% proximal LCx, 30% distal LCx, 90% pRCA. He has been on Plavix and Eliquis but was having ongoing bleeding issues 2/2 prior radiation therapy/proctitis. Eliquis was stopped 5/14/24 which did help, but even on Plavix only he had some bleeding. Thus, he was referred for consideration for CABG instead of percutaneous revascularization given concern for inability to tolerate needed DAPT. He underwent CABG X2 (LIMA-LAD, SVG-LPL) with concomitant surgical PVI RFA and STACY ligation on 7/30/24. He was extubated POD0 and uses CPAP at night. Post operatively, he was noted to have some junctional rhythm ~50 bpm and was using temporary epicardial pacing. Pre-operatively his baseline ECG showed SR with 1 AVB and RBBB. His intrinsic rhythm recovered. An OSH recent echo showed LVEF 60-65% and Grade 1 diastolic dysfunction. He went into AF with RVR on 8/3/24 around 0500 and has remained in AF. His diltiazem was increased.   Patient converted out of AF into junctional rhythm 8/7, HDS and asymptomatic with this. Metoprolol and diltiazem held. Remained junctional in 50s until converting back into AF overnight last night  bpm. He is asymptomatic and HDS with AF. Current cardiac meds: amio  "400 mg daily, diltiazem 30 mg q8 hrs (held since 8/7), lopresor 25 mg bid (held since 8/7) and crestor.   O:   Vitals: /63 (BP Location: Left arm)   Pulse 89   Temp 97.5  F (36.4  C) (Oral)   Resp 20   Ht 1.74 m (5' 8.5\")   Wt (!) 151.5 kg (334 lb 1.6 oz)   SpO2 97%   BMI 50.06 kg/m    Gen: AxO, NAD   Lungs: CTAB   CV: S1S2, irregular rhythm  Abd: Soft, nondistended   Ext: No pedal edema    Data:  Labs:  BMP  Recent Labs   Lab 08/09/24  0751 08/09/24  0324 08/08/24 2129 08/08/24  1739 08/08/24  0807 08/08/24  0451 08/07/24  0912 08/07/24  0533 08/06/24  0747 08/06/24  0006   NA  --  131*  --   --   --  133*  --  133*  --  134*   POTASSIUM  --  4.1  --   --   --  4.6  --  4.0  --  4.2   CHLORIDE  --  97*  --   --   --  100  --  101  --  100   VISH  --  8.4*  --   --   --  8.6*  --  8.4*  --  8.5*   CO2  --  23  --   --   --  24  --  20*  --  26   BUN  --  38.1*  --   --   --  40.0*  --  35.0*  --  42.8*   CR  --  1.76*  --   --   --  1.72*  --  1.23*  --  1.36*   * 153* 166* 211*   < > 166*   < > 145*   < > 163*    < > = values in this interval not displayed.     CBC  Recent Labs   Lab 08/09/24  0324 08/08/24  0451 08/07/24 2200 08/07/24  1736 08/07/24  0927 08/07/24  0533 08/06/24  0535   WBC 6.4 7.2  --   --   --  5.3 5.5   RBC 3.02* 2.80*  --   --   --  2.63* 2.86*   HGB 9.0* 8.2*  8.2* 8.3* 7.6*   < > 7.7* 8.5*   HCT 28.0* 26.7*  --   --   --  25.2* 27.4*   MCV 93 95  --   --   --  96 96   MCH 29.8 29.3  --   --   --  29.3 29.7   MCHC 32.1 30.7*  --   --   --  30.6* 31.0*   RDW 18.0* 18.2*  --   --   --  18.6* 18.7*    161  --   --   --  140* 153    < > = values in this interval not displayed.     INR  Recent Labs   Lab 08/07/24  0533 08/06/24  0535 08/05/24  0629 08/04/24  0451   INR 1.14 1.25* 1.11 1.14       Meds per EPIC EMR:  Current Facility-Administered Medications   Medication Dose Route Frequency Provider Last Rate Last Admin    acetaminophen (TYLENOL) tablet 650 mg  650 mg " Oral or Feeding Tube Q4H PRN Blayne Hammond MD   650 mg at 08/07/24 1926    amiodarone (PACERONE) tablet 400 mg  400 mg Oral Daily Sandra Joaquin PA-C   400 mg at 08/08/24 0815    Followed by    [START ON 8/21/2024] amiodarone (PACERONE) tablet 200 mg  200 mg Oral Daily Sandra Joaquin PA-C        aspirin (ASA) chewable tablet 81 mg  81 mg Oral Daily Kenji Charles APRN CNP        bisacodyl (DULCOLAX) suppository 10 mg  10 mg Rectal Daily PRN Blayne Hammond MD        glucose gel 15-30 g  15-30 g Oral Q15 Min PRN Blayne Hammond MD        Or    dextrose 50 % injection 25-50 mL  25-50 mL Intravenous Q15 Min PRN Blayne Hammond MD        Or    glucagon injection 1 mg  1 mg Subcutaneous Q15 Min PRN Blayne Hammond MD        [Held by provider] diltiazem (CARDIZEM) tablet 30 mg  30 mg Oral Q8H [facility] Ruth Gaspar APRN CNP   30 mg at 08/07/24 1529    empagliflozin (JARDIANCE) tablet 10 mg  10 mg Oral QAM Sandra Joaquin PA-C   10 mg at 08/09/24 0807    fluticasone (FLONASE) 50 MCG/ACT spray 1 spray  1 spray Both Nostrils QPM Lachelle Mauricio MD   1 spray at 08/08/24 2207    [Held by provider] heparin ANTICOAGULANT injection 5,000 Units  5,000 Units Subcutaneous Q8H Blayne Hammond MD   5,000 Units at 08/07/24 0556    insulin aspart (NovoLOG) injection (RAPID ACTING)  1-10 Units Subcutaneous TID AC Kevin Aguila PA-C   1 Units at 08/09/24 0808    insulin aspart (NovoLOG) injection (RAPID ACTING)  1-7 Units Subcutaneous At Bedtime Kevin Aguila PA-C   3 Units at 08/04/24 2112    [START ON 8/10/2024] insulin glargine (LANTUS PEN) injection 25 Units  25 Units Subcutaneous QAM AC Kenji Charles APRN CNP        insulin glargine (LANTUS PEN) injection 5 Units  5 Units Subcutaneous Once Kenji Charles APRN CNP        lidocaine (LMX4) cream   Topical Q1H PRN Sandra Joaquin PA-C        lidocaine (LMX4) cream   Topical Q1H PRN Blayne Hammond MD        lidocaine  1 % 0.1-1 mL  0.1-1 mL Other Q1H PRN Sandra Joaquin PA-C        lidocaine 1 % 0.1-1 mL  0.1-1 mL Other Q1H PRN Blayne Hammond MD        magnesium hydroxide (MILK OF MAGNESIA) suspension 30 mL  30 mL Oral or Feeding Tube Daily PRN Blayne Hammond MD   30 mL at 08/01/24 1717    melatonin tablet 5 mg  5 mg Oral QPM Mallika Del Angel RPH   5 mg at 08/08/24 2004    methocarbamol (ROBAXIN) tablet 500 mg  500 mg Oral or Feeding Tube Q6H PRN Blayne Hammond MD   500 mg at 08/02/24 0108    [Held by provider] metoprolol tartrate (LOPRESSOR) tablet 25 mg  25 mg Oral BID Sandra Joaquin PA-C   25 mg at 08/07/24 0907    naloxone (NARCAN) injection 0.2 mg  0.2 mg Intravenous Q2 Min PRN Deniz Wilson MD        Or    naloxone (NARCAN) injection 0.4 mg  0.4 mg Intravenous Q2 Min PRN Deniz Wilson MD        Or    naloxone (NARCAN) injection 0.2 mg  0.2 mg Intramuscular Q2 Min PRN Deniz Wilson MD        Or    naloxone (NARCAN) injection 0.4 mg  0.4 mg Intramuscular Q2 Min PRN Deniz Wilson MD        ondansetron (ZOFRAN ODT) ODT tab 4 mg  4 mg Oral Q6H PRN Blayne Hammond MD        Or    ondansetron (ZOFRAN) injection 4 mg  4 mg Intravenous Q6H PRN Blayne Hammond MD        oxyBUTYnin ER (DITROPAN XL) 24 hr tablet 10 mg  10 mg Oral QAM Emy Garcia APRN CNP   10 mg at 08/09/24 0807    pantoprazole (PROTONIX) EC tablet 40 mg  40 mg Oral BID AC Sandra Joaquin PA-C   40 mg at 08/09/24 0807    Patient may continue current oral medications   Does not apply Continuous PRN Neal Bray MD        polyethylene glycol (MIRALAX) Packet 17 g  17 g Oral or Feeding Tube Daily PRN Tony Lakhani MD   17 g at 08/08/24 0816    polyethylene glycol (MIRALAX) Packet 17 g  17 g Oral or Feeding Tube Daily Blayne Hammond MD   17 g at 08/01/24 0902    prochlorperazine (COMPAZINE) injection 5 mg  5 mg Intravenous Q6H PRN Blayne Hammond MD        Or    prochlorperazine (COMPAZINE) tablet 5 mg  5 mg Oral  or Feeding Tube Q6H PRN Blayne Hammond MD        rosuvastatin (CRESTOR) tablet 20 mg  20 mg Oral Daily Kevin Aguila PA-C   20 mg at 08/09/24 0807    senna-docusate (SENOKOT-S/PERICOLACE) 8.6-50 MG per tablet 1 tablet  1 tablet Oral or Feeding Tube BID PRN Tony Lakhani MD   1 tablet at 08/08/24 0815    Or    senna-docusate (SENOKOT-S/PERICOLACE) 8.6-50 MG per tablet 2 tablet  2 tablet Oral or Feeding Tube BID PRN Tony Lakhani MD   2 tablet at 08/07/24 1532    senna-docusate (SENOKOT-S/PERICOLACE) 8.6-50 MG per tablet 1 tablet  1 tablet Oral or Feeding Tube BID Blayne Hammond MD   1 tablet at 08/09/24 0807    sodium chloride (OCEAN) 0.65 % nasal spray 1 spray  1 spray Both Nostrils Q1H PRN Soledad Quesada, APRN CNP   1 spray at 08/08/24 2009    sodium chloride (PF) 0.9% PF flush 10 mL  10 mL Intracatheter Q8H Sandra Joaquin PA-C   10 mL at 08/09/24 0324    sodium chloride (PF) 0.9% PF flush 10-20 mL  10-20 mL Intracatheter q1 min prn Sandra Joaquin PA-C        sodium chloride (PF) 0.9% PF flush 10-40 mL  10-40 mL Intracatheter Once PRN Sandra Joaquin PA-C        sodium chloride (PF) 0.9% PF flush 3 mL  3 mL Intracatheter Q8H Blayne Hammond MD   3 mL at 08/09/24 0808    sodium chloride (PF) 0.9% PF flush 3 mL  3 mL Intracatheter q1 min prn Blayne Hammond MD        sucralfate (CARAFATE) suspension 2 g  2 g Rectal bid 08 & 14 Sandra Joaquin PA-C   2 g at 08/08/24 1639    tamsulosin (FLOMAX) capsule 0.4 mg  0.4 mg Oral Daily Kevin Aguila PA-C   0.4 mg at 08/09/24 0808    torsemide (DEMADEX) tablet 10 mg  10 mg Oral Daily Kenji Charles APRN CNP        traZODone (DESYREL) half-tab 25 mg  25 mg Oral At Bedtime PRN Emy Garcia APRN CNP   25 mg at 08/04/24 2113    Or    traZODone (DESYREL) tablet 50 mg  50 mg Oral At Bedtime PRN Emy Garcia APRN CNP         Echo: 8/09/2024   Interpretation Summary  Extremely poor acoustic windows.  Grossly normal  LV function  Grossly normal RV function.  Likely small organizing pericardial effusion at the RV free wall without  chamber compression.  Dilation of the inferior vena cava is present with abnormal respiratory  variation in diameter.  EK/3/24    A:   Mr. Hutchinson is a 74 year old male who has a medical history significant for CAD s/p PCI LCx , SVT, PAT/AFL (CHADSVASC 5), HTN, HLD, HERRERA (uses CPAP), CKD, DM2, OA, prostate CA s/p radiation c/b proctitis, GIB, and obesity.   He had a prior history of CAD and had prior PCI to LCx in . He also has a history of AT/AFL and had been on Sotalol and Diltiazem as well as Eliquis for stroke prophylaxis. He also occasionally take PRN short acting diltiazem with palpitations. He then developed significant GALLARDO and had a positive stress test. Subsequent coronary angiogram showed 70% occulsion of the proximal LAD, 60% distal LAD, 30% proximal LCx, 30% distal LCx, 90% pRCA. He has been on Plavix and Eliquis but was having ongoing bleeding issues 2/2 prior radiation therapy/proctitis. Eliquis was stopped 24 which did help, but even on Plavix only he had some bleeding. Thus, he was referred for consideration for CABG instead of percutaneous revascularization given concern for inability to tolerate needed DAPT. He underwent CABG X2 (LIMA-LAD, SVG-LPL) with concomitant surgical PVI RFA and STACY ligation on 24. He was extubated POD0 and uses CPAP at night. Post operatively, he was noted to have some junctional rhythm ~50 bpm and was using temporary epicardial pacing. Pre-operatively his baseline ECG showed SR with 1 AVB and RBBB. His intrinsic rhythm recovered. An OSH recent echo showed LVEF 60-65% and Grade 1 diastolic dysfunction. He went into AF with RVR on 8/3/24 around 0500 and has remained in AF. His diltiazem was increased.   Patient converted out of AF into junctional rhythm , HDS and asymptomatic with this. Metoprolol and diltiazem held. Remained junctional  in 50s until converting back into AF overnight last night  bpm. He is asymptomatic and HDS with AF. Current cardiac meds: amio 400 mg daily, diltiazem 30 mg q8 hrs (held since 8/7), lopresor 25 mg bid (held since 8/7) and crestor.   EP recommendations:   Junctional Rhythm   Paroxysmal Atrial Fibrillation/Flutter:   1. Stroke Prophylaxis: CHADSVASC ++age, +CAD, +HTN, +DM 5, corresponding to a 6.7% annual stroke / systemic embolism event rate. He did have STACY ligation, AC on hold per surgical team.   2. Rate Control: Continue holding metoprolol and diltiazem.   3. Rhythm Control: Cardioversion, Antiarrhythmics and/or ablation are options for rhythm control. He was started on amiodarone IV bolus and gtt, followed by  mg daily. We discussed cardioversion if AF remains persistent after amio loading for evaluation of underlying rhythm and to maintain regular rhythm. Surgical team prefers to hold on AC initiation, so we are unable to proceed with DCCV for now. We discussed close cardiology follow up next week to reassess AC and need for DCCV. Recommend continuing to hold metoprolol and diltiazem in case he converts out of  Afib and into sinus jimenez or junctional rhythm to avoid lower rates. If still remains in junctional after DCCV, may need to consider PPM in the future.     The patient states understanding and is agreeable with plan.   Thank you much for allowing us to participate in the care of this pleasant patient.   This patient was discussed with Dr. Johnson and the note above reflects our joint assessment and plan.     Rekha Zelaya PA-C  [facility]  Electrophysiology Consult Service  Pager: [phone]       I very much appreciated the opportunity to assess Jorje DEBBIE Evangelina in the hospital with CV POLO Rekha Zelaya. The note above summarizes my findings and current recommendations.  Please do not hesitate to contact my office if you have any questions or concerns.      Anish PALAFOX  Elizabeth GARCIA  Cardiac Arrhythmia Service  [facility]  [phone]

## 2024-08-09 NOTE — PLAN OF CARE
Neuro: A&Ox4.   Cardiac: Junctional 50s-70 flipped to Afib 80s-90s, provider notified. VSS.  Respiratory: Sating >94% on RA/CPAP.  GI/: Adequate urine output. BM X1, blood clots/streaks, provider aware  Diet/appetite: Tolerating 60g carb/cardiac diet. Eating well.  Activity:  Assist of 1, up to commode  Pain: Denies  Skin: No new deficits noted.  LDA's: R double PICC,R/L PIV    Plan: Continue with POC. Notify primary team with changes.  Goal Outcome Evaluation:    Problem: Cardiovascular Surgery  Goal: Improved Activity Tolerance  Outcome: Progressing     Problem: Cardiovascular Surgery  Goal: Effective Cardiac Function  Outcome: Progressing

## 2024-08-09 NOTE — PROGRESS NOTES
Care Management Follow Up    Length of Stay (days): 10    Expected Discharge Date: 08/10/2024     Concerns to be Addressed: Discharge Planning  Patient plan of care discussed at interdisciplinary rounds: Yes    Anticipated Discharge Disposition: Home     Anticipated Discharge Services: Home Care Referral from PCP  Anticipated Discharge DME: None    Patient/family educated on Medicare website which has current facility and service quality ratings: yes  Education Provided on the Discharge Plan: No  Patient/Family in Agreement with the Plan: yes    Referrals Placed by CM/SW:  Home Care  Private pay costs discussed: Not applicable    Additional Information:  Per Provider, Pt medically ready for discharge likely 8/10. Pt and family preference is discharge to home. Writer followed up on home care referral to [facility]  Ph: [phone] #[phone] Fax: [phone] ([place]). They do not have availability to accept pt at this time. They asked that Pt check with their PCP for a referral later next week to inquire on availability. Writer faxed home care referral to [facility]  Ph: [phone], however was not able to connect with representatives regarding the referral. These are the only two home care agencies in the area. Pt, Pt's Spouse, and Provider all updated and ok with discharge to home without home care.     Writer followed up on DME equipment. Per previous RNCC note, Pt and Spouse were interested in the equipment listed below. Writer provided the cost estimates. Pt and Spouse will obtain these from local DME company next week and report they will be ok at home without them until then.     Bariatric shower chair without arms $60  Bariatric commode with arms $80  [facility] Electric Hospital bed $175/month--pt no longer wants    Per PT, practiced entry into vehicle and everything went well and no additional equipment is necessary for this transfer.     Pt was updated (and already aware) with the following appt.     Alycia  Daniel SALGADO on: Wednesday, 8/21/24  Check In: 2:40pm  Ph: [phone]  Fax: [phone]  [facility]      [address], [place] [address]  [phone]  Alycia will then set pt up with a Dr Parikh appointment and she can order OP CR for when HHPT/OT is done.    Follow up appt and resources for DME and Home Health Companies included in Pt's discharge instructions on AVS.     Shayne Faye RNCC  Covering for 6C ([id number] & [phone])  Phone [phone]  Pager [phone]    RN Care Coordinator     Social Work and Care Management Department      SEARCHABLE in [facility] - search CARE COORDINATOR       [place] ([id number]) Saturday & Sunday; ([phone]) [facility] Recognized Holidays     Units: 5A Onc Vocera & 5C Vocera Pager: [phone]    Units: 6B Vocera & 6C Vocera  Pager: [phone]    Units: 7A SOT RNCC Vocera, 7B Med Surg Vocera, & 7C Med Surg Vocera  Pager: [phone]    Units: 6A Vocera & 4A CVICU Vocera, 4C MICU Vocera, and 4E SICU Vocera   Pager: [phone]    Units: 5 Ortho Vocera & 5 Med Surg Vocera  Pager: [phone]    Units: 6 Med Surg Vocera & 8 Med Surg Vocera  Pager [phone]

## 2024-08-09 NOTE — PLAN OF CARE
Goal Outcome Evaluation:       D:Pt admitted for  CABG x2 and PVI and LAAL with sternal plating on 7/30/24 with Dr. Deniz Wilosn. Pt has PMH   CAD sp coronary angioplasty w/stent placement of circumflex coronary artery in 2002, HTN, HLD, SVT, chronic afib (on apixaban), HERRERA on home CPAP, CKD, T2DM, OA, prostate cancer, and worsening morbid obesity      A/I: Pt has been in junctional rhythm rates in the 50's-70's. Pt other VSS. Pt getting up with assist of 1 and walker. Pt's been alert and oriented. Pt's lungs diminished in bases otherwise clear. Pt has good productive cough. Pt uses CPAP at night. Pt voiding in fair amounts. Pt has been having loose brown stools followed by large formed stool with blood streaks Pt did receive a total of 2 units RBC's in last 24 hours. Pt's  leg and sternal incisions CDI. Pt has denied any c/o pain.. Pt eating and drinking. Pt anticipating discharge to home tomorrow or Saturday. Pt has DL PICC along with 2 PIV's.    P: Dr Ballesteros to evaluate tomorrow for possible discharge. Continue current POC. Continue to monitor rhythm

## 2024-08-09 NOTE — PLAN OF CARE
"74 year old male with a PMHx of CAD sp coronary angioplasty w/stent placement of circumflex coronary artery in 2002, HTN, HLD, SVT, chronic afib (on apixaban), HERRERA on home CPAP, CKD, T2DM, OA, prostate cancer.   Patient is now s/p planned CABG x2 and PVI and LAAL with sternal plating on 7/30/24     Blood pressure 117/63, pulse 90, temperature 97.7  F (36.5  C), temperature source Oral, resp. rate 18, height 1.74 m (5' 8.5\"), weight (!) 151.5 kg (334 lb 1.6 oz), SpO2 97%.     Problem: Adult Inpatient Plan of Care  Goal: Patient-Specific Goal (Individualized)  Description: Patient will ambulate in room and in the hallways three times during the shift.  Outcome: Progressing     Problem: Adult Inpatient Plan of Care  Goal: Absence of Hospital-Acquired Illness or Injury  Intervention: Prevent Skin Injury  Recent Flowsheet Documentation  Taken 8/9/2024 0800 by America Freeman RN  Body Position: sitting up in bed     Problem: Adult Inpatient Plan of Care  Goal: Absence of Hospital-Acquired Illness or Injury  Intervention: Prevent Infection  Recent Flowsheet Documentation  Taken 8/9/2024 0800 by America Freeman RN  Infection Prevention:   equipment surfaces disinfected   hand hygiene promoted   single patient room provided     Problem: Risk for Delirium  Goal: Optimal Coping  Outcome: Progressing  Intervention: Optimize Psychosocial Adjustment to Delirium  Recent Flowsheet Documentation  Taken 8/9/2024 0800 by America Freeman RN  Supportive Measures: active listening utilized, ambulate outside the room, orient to surroundings daily    Goal Outcome Evaluation: Pt Specific goal of ambulating three times in room and hallways attained. Other goals are in progress.    Neuro: A&Ox4.   Cardiac: Afebrile, VSS.   Respiratory: RA   GI/: Voiding spontaneously. 3 loose bloody BMs this shift.   Diet/appetite: Tolerating 60g CHO diet. Denies nausea   Activity: Up with assist x1/SBA    Pain: Denies   Skin: No new deficits " noted.  Lines: R DL PICC SL, L PIV SL  Drains: None  Replacement: None    Pt ready to discharge tomorrow morning, driving to [place] with family.

## 2024-08-10 VITALS
HEART RATE: 72 BPM | TEMPERATURE: 98.3 F | SYSTOLIC BLOOD PRESSURE: 115 MMHG | BODY MASS INDEX: 46.65 KG/M2 | HEIGHT: 69 IN | RESPIRATION RATE: 17 BRPM | DIASTOLIC BLOOD PRESSURE: 55 MMHG | WEIGHT: 315 LBS | OXYGEN SATURATION: 95 %

## 2024-08-10 LAB
ANION GAP SERPL CALCULATED.3IONS-SCNC: 9 MMOL/L (ref 7–15)
BUN SERPL-MCNC: 31.2 MG/DL (ref 8–23)
CALCIUM SERPL-MCNC: 8.1 MG/DL (ref 8.8–10.4)
CHLORIDE SERPL-SCNC: 102 MMOL/L (ref 98–107)
CREAT SERPL-MCNC: 1.55 MG/DL (ref 0.67–1.17)
EGFRCR SERPLBLD CKD-EPI 2021: 46 ML/MIN/1.73M2
ERYTHROCYTE [DISTWIDTH] IN BLOOD BY AUTOMATED COUNT: 17.8 % (ref 10–15)
GLUCOSE BLDC GLUCOMTR-MCNC: 146 MG/DL (ref 70–99)
GLUCOSE SERPL-MCNC: 171 MG/DL (ref 70–99)
HCO3 SERPL-SCNC: 24 MMOL/L (ref 22–29)
HCT VFR BLD AUTO: 28.7 % (ref 40–53)
HGB BLD-MCNC: 9 G/DL (ref 13.3–17.7)
MAGNESIUM SERPL-MCNC: 2.4 MG/DL (ref 1.7–2.3)
MCH RBC QN AUTO: 29.9 PG (ref 26.5–33)
MCHC RBC AUTO-ENTMCNC: 31.4 G/DL (ref 31.5–36.5)
MCV RBC AUTO: 95 FL (ref 78–100)
PLATELET # BLD AUTO: 156 10E3/UL (ref 150–450)
POTASSIUM SERPL-SCNC: 3.7 MMOL/L (ref 3.4–5.3)
RBC # BLD AUTO: 3.01 10E6/UL (ref 4.4–5.9)
SODIUM SERPL-SCNC: 135 MMOL/L (ref 135–145)
WBC # BLD AUTO: 6.2 10E3/UL (ref 4–11)

## 2024-08-10 PROCEDURE — 85027 COMPLETE CBC AUTOMATED: CPT | Performed by: PHYSICIAN ASSISTANT

## 2024-08-10 PROCEDURE — 250N000013 HC RX MED GY IP 250 OP 250 PS 637: Performed by: NURSE PRACTITIONER

## 2024-08-10 PROCEDURE — 250N000013 HC RX MED GY IP 250 OP 250 PS 637: Performed by: SURGERY

## 2024-08-10 PROCEDURE — 80048 BASIC METABOLIC PNL TOTAL CA: CPT | Performed by: PHYSICIAN ASSISTANT

## 2024-08-10 PROCEDURE — 250N000013 HC RX MED GY IP 250 OP 250 PS 637

## 2024-08-10 PROCEDURE — 250N000013 HC RX MED GY IP 250 OP 250 PS 637: Performed by: PHYSICIAN ASSISTANT

## 2024-08-10 PROCEDURE — 94660 CPAP INITIATION&MGMT: CPT

## 2024-08-10 PROCEDURE — 83735 ASSAY OF MAGNESIUM: CPT | Performed by: PHYSICIAN ASSISTANT

## 2024-08-10 PROCEDURE — 999N000157 HC STATISTIC RCP TIME EA 10 MIN

## 2024-08-10 RX ORDER — TORSEMIDE 20 MG/1
20 TABLET ORAL DAILY
Status: DISCONTINUED | OUTPATIENT
Start: 2024-08-10 | End: 2024-08-10 | Stop reason: HOSPADM

## 2024-08-10 RX ORDER — ACETAMINOPHEN 325 MG/1
650 TABLET ORAL EVERY 4 HOURS PRN
COMMUNITY
Start: 2024-08-10

## 2024-08-10 RX ORDER — LOSARTAN POTASSIUM 25 MG/1
12.5 TABLET ORAL DAILY
Status: SHIPPED
Start: 2024-08-10

## 2024-08-10 RX ORDER — POLYETHYLENE GLYCOL 3350 17 G/17G
17 POWDER, FOR SOLUTION ORAL DAILY
Qty: 510 G | Refills: 0 | Status: SHIPPED | OUTPATIENT
Start: 2024-08-10

## 2024-08-10 RX ORDER — AMIODARONE HYDROCHLORIDE 200 MG/1
TABLET ORAL
Qty: 40 TABLET | Refills: 0 | Status: SHIPPED | OUTPATIENT
Start: 2024-08-10 | End: 2024-09-14

## 2024-08-10 RX ORDER — AMOXICILLIN 250 MG
1-2 CAPSULE ORAL 2 TIMES DAILY PRN
Qty: 100 TABLET | Refills: 0 | Status: SHIPPED | OUTPATIENT
Start: 2024-08-10

## 2024-08-10 RX ORDER — POTASSIUM CHLORIDE 750 MG/1
20 TABLET, EXTENDED RELEASE ORAL ONCE
Status: COMPLETED | OUTPATIENT
Start: 2024-08-10 | End: 2024-08-10

## 2024-08-10 RX ORDER — LOSARTAN POTASSIUM 25 MG/1
12.5 TABLET ORAL DAILY
Status: CANCELLED
Start: 2024-08-10

## 2024-08-10 RX ORDER — PANTOPRAZOLE SODIUM 40 MG/1
40 TABLET, DELAYED RELEASE ORAL
Qty: 20 TABLET | Refills: 0 | Status: SHIPPED | OUTPATIENT
Start: 2024-08-10 | End: 2024-08-30

## 2024-08-10 RX ORDER — POTASSIUM CHLORIDE 750 MG/1
20 TABLET, EXTENDED RELEASE ORAL ONCE
Status: COMPLETED | OUTPATIENT
Start: 2024-08-10

## 2024-08-10 RX ORDER — ROSUVASTATIN CALCIUM 20 MG/1
20 TABLET, COATED ORAL DAILY
Qty: 30 TABLET | Refills: 1 | Status: SHIPPED | OUTPATIENT
Start: 2024-08-10

## 2024-08-10 RX ORDER — TORSEMIDE 10 MG/1
10 TABLET ORAL DAILY
Status: CANCELLED
Start: 2024-08-10

## 2024-08-10 RX ORDER — METHOCARBAMOL 500 MG/1
500 TABLET, FILM COATED ORAL EVERY 6 HOURS PRN
Qty: 20 TABLET | Refills: 0 | Status: SHIPPED | OUTPATIENT
Start: 2024-08-10

## 2024-08-10 RX ORDER — BISACODYL 10 MG
10 SUPPOSITORY, RECTAL RECTAL DAILY PRN
COMMUNITY
Start: 2024-08-10

## 2024-08-10 RX ADMIN — OXYBUTYNIN CHLORIDE 10 MG: 10 TABLET, EXTENDED RELEASE ORAL at 07:57

## 2024-08-10 RX ADMIN — ROSUVASTATIN CALCIUM 20 MG: 20 TABLET, FILM COATED ORAL at 07:57

## 2024-08-10 RX ADMIN — AMIODARONE HYDROCHLORIDE 400 MG: 200 TABLET ORAL at 07:57

## 2024-08-10 RX ADMIN — POTASSIUM CHLORIDE 20 MEQ: 750 TABLET, EXTENDED RELEASE ORAL at 07:57

## 2024-08-10 RX ADMIN — INSULIN ASPART 1 UNITS: 100 INJECTION, SOLUTION INTRAVENOUS; SUBCUTANEOUS at 07:58

## 2024-08-10 RX ADMIN — PANTOPRAZOLE SODIUM 40 MG: 40 TABLET, DELAYED RELEASE ORAL at 07:57

## 2024-08-10 RX ADMIN — EMPAGLIFLOZIN 10 MG: 10 TABLET, FILM COATED ORAL at 07:57

## 2024-08-10 RX ADMIN — ASPIRIN 81 MG CHEWABLE TABLET 81 MG: 81 TABLET CHEWABLE at 07:57

## 2024-08-10 ASSESSMENT — ACTIVITIES OF DAILY LIVING (ADL)
ADLS_ACUITY_SCORE: 39
ADLS_ACUITY_SCORE: 38
ADLS_ACUITY_SCORE: 39
ADLS_ACUITY_SCORE: 38
ADLS_ACUITY_SCORE: 39

## 2024-08-10 NOTE — PLAN OF CARE
Neuro: A&Ox4.   Endocrine: BG before meals & HS.  Cardiac: Afebrile, VSS. Afib, jimenez 40-50's.    Respiratory: RA/ cpap @ HS  GI/: Voiding spontaneously. No BM this shift. LBM 8/9. Clots in stools.   Diet/appetite: Tolerating 50 CHO & lowfat diet. Denies nausea.  Activity: Up with 1 assist w walker.  Pain: Denies   Skin: Sternal incision TALAT, CT sites TALAT, LLE graft site TALAT.   Lines: R DL PICC.  Plan: plan for discharge today, report changes to [facility].

## 2024-08-10 NOTE — PLAN OF CARE
Physical Therapy Discharge Summary    Reason for therapy discharge:    Discharged to home with home therapy.    Progress towards therapy goal(s). See goals on Care Plan in [facility] electronic health record for goal details.  Goals partially met.  Barriers to achieving goals:   discharge from facility.    Therapy recommendation(s):    Continued therapy is recommended.  Rationale/Recommendations:  home PT to maximize pt's safety and IND.

## 2024-08-10 NOTE — PROGRESS NOTES
"DISCHARGE                         8/10/2024  9:24 AM  ----------------------------------------------------------------------------    /55   Pulse 72   Temp 98.3  F (36.8  C) (Oral)   Resp 17   Ht 1.74 m (5' 8.5\")   Wt (!) 150.2 kg (331 lb 1.6 oz)   SpO2 95%   BMI 49.61 kg/m        Discharged to: Home  Via: Automobile  Accompanied by: Family (Wife)  Discharge Instructions: diet, activity, medications, follow up appointments, when to call the MD, aftercare instructions, and what to watchout for (i.e. s/s of infection, increasing SOB, palpitations, chest pain,)  Prescriptions: To be filled by   Pt's pharmacy in [place], [place] per pt's request; medication list reviewed & sent with pt.  Follow Up Appointments: information given  Belongings: All sent with pt  IV: out  Telemetry: off    Pt exhibits understanding of above discharge instructions; all questions answered.    Discharge Paperwork: Copied, and sent home with patient.    "

## 2024-08-10 NOTE — PLAN OF CARE
Occupational Therapy Discharge Summary    Reason for therapy discharge:    Discharged to home.    Progress towards therapy goal(s). See goals on Care Plan in [facility] electronic health record for goal details.  Goals partially met.  Barriers to achieving goals:   discharge from facility.    Therapy recommendation(s):    Continued therapy is recommended.  Rationale/Recommendations:  HHOT to promote strength and IND in ADLs.

## 2024-08-13 ENCOUNTER — DOCUMENTATION ONLY (OUTPATIENT)
Dept: OTHER | Facility: CLINIC | Age: 74
End: 2024-08-13
Payer: MEDICARE

## 2024-08-13 ENCOUNTER — TELEPHONE (OUTPATIENT)
Dept: CARDIOLOGY | Facility: CLINIC | Age: 74
End: 2024-08-13
Payer: MEDICARE

## 2024-08-13 NOTE — TELEPHONE ENCOUNTER
ACTIVITY  How is your activity tolerance? Patient up and walking around the home with a walker, tolerating well at this time, no complications at this time.     POST OP MONITORING  How is your pain on a 0-10 scale, how are you managing your pain? No pain, pain managed well    Are following your sternal precautions? Yes    Do you hear any clicking when you are moving or taking a deep breath?  No    Are you weighing yourself daily?  Yes decreasing    Are you using the inspirometer? Yes      SIGNS AND SYMPTOMS OF INFECTION  1. INCREASE IN PAIN No  2. FEVER No  3. DRAINAGE No    If Yes, color:                 5. REDNESS No    6. SWELLING No  Sternal incision is healing well, wound edges approximated well. Call your surgeon or primary care provider's office immediately if experiencing any of the symptoms mentioned above. Chest tube incision sites are healing well; no s/s of infection present. Drainage Minimal drainage     ASSISTANCE  Do you have someone at home to assist you with your daily activities?  Yes, spouse and home care nurse      MEDICATIONS  Is someone helping you to set up your medications?  Yes  Do you have any questions about your medications?  No     FOLLOW UP  Are you scheduled for cardiac rehab? Patient has OT/PT at home      You are scheduled to see our surgery advanced practice provider for post operative follow up on Will not follow up with [facility] as patient lives in [place]   You are scheduled to see your cardiologist on 8/21  You are scheduled to see your primary care physician on 8/14       CONTACT INFORMATION  Please feel free to call us with any other questions or symptoms that are concerning for you at , if it is after 4:30 in the afternoon, or a weekend please call [phone] and ask for the on call specialist.  We want to do everything we can to help prevent you needing to return to the ED, so please do not hesitate to call us.

## 2024-08-28 ENCOUNTER — TELEPHONE (OUTPATIENT)
Dept: GASTROENTEROLOGY | Facility: CLINIC | Age: 74
End: 2024-08-28
Payer: MEDICARE

## 2024-08-28 NOTE — TELEPHONE ENCOUNTER
"Endoscopy Scheduling Screen    Have you had a positive Covid test in the last 14 days?  No    What is your communication preference for Instructions and/or Bowel Prep?   MyChart    What insurance is in the chart?  Other:  medicare/bcbs    Ordering/Referring Provider:     LEON MERAZ      (If ordering provider performs procedure, schedule with ordering provider unless otherwise instructed. )    BMI: Estimated body mass index is 49.61 kg/m  as calculated from the following:    Height as of 7/30/24: 1.74 m (5' 8.5\").    Weight as of 8/10/24: 150.2 kg (331 lb 1.6 oz).     Sedation Ordered  moderate sedation.   If patient BMI > 50 do not schedule in [facility].    If patient BMI > 45 do not schedule at [facility].    Are you taking methadone or Suboxone?  No    Have you had difficulties, pain, or discomfort during past endoscopy procedures?  No    Are you taking any prescription medications for pain 3 or more times per week?   NO, No RN review required.    Do you have a history of malignant hyperthermia?  No    (Females) Are you currently pregnant?   No     Have you been diagnosed or told you have pulmonary hypertension?   No    Do you have an LVAD?  No    Have you been told you have moderate to severe sleep apnea?  Yes (RN Review required for scheduling unless scheduling in Hospital.)  cpap  Have you been told you have COPD, asthma, or any other lung disease?  No    Do you have any heart conditions?  Yes     In the past year, have you had any hospitalizations for heart related issues including cardiomyopathy, heart attack, or stent placement?  Yes (RN Review required for scheduling.)  Hospital in July 2024  Do you have any implantable devices in your body (pacemaker, ICD)?  No    Do you take nitroglycerine?  No    Have you ever had or are you waiting for an organ transplant?  No. Continue scheduling, no site restrictions.    Have you had a stroke or transient ischemic attack (TIA aka \"mini stroke\" in the last 6 " "months?   No    Have you been diagnosed with or been told you have cirrhosis of the liver?   No    Are you currently on dialysis?   No    Do you need assistance transferring?   No    BMI: Estimated body mass index is 49.61 kg/m  as calculated from the following:    Height as of 7/30/24: 1.74 m (5' 8.5\").    Weight as of 8/10/24: 150.2 kg (331 lb 1.6 oz).     Is patients BMI > 40 and scheduling location [facility]?  No    Do you take an injectable medication for weight loss or diabetes (excluding insulin)?  No    Do you take the medication Naltrexone?  No    Do you take blood thinners?  NO           Prep   Are you currently on dialysis or do you have chronic kidney disease?  Yes (Golytely Prep)    Do you have a diagnosis of diabetes?  Yes (Golytely Prep)    Do you have a diagnosis of cystic fibrosis (CF)?  No    On a regular basis do you go 3 -5 days between bowel movements?  No    BMI > 40?  No    Preferred Pharmacy:    [address] [address]  Phone: [phone] Fax: [phone]      Final Scheduling Details     Procedure scheduled  Flexible sigmoidoscopy    Surgeon:  Sergey     Date of procedure:  TBD     Pre-OP / PAC:   No - Not required for this site.    Location  [facility] - Per exclusion criteria.    Sedation   Moderate Sedation - Per order.    I will call him back/need nurses to review chart again to make sure we can proceed in November with heart issues and being in the hospital in July      Patient Reminders:   You will receive a call from a Nurse to review instructions and health history.  This assessment must be completed prior to your procedure.  Failure to complete the Nurse assessment may result in the procedure being cancelled.      On the day of your procedure, please designate an adult(s) who can drive you home stay with you for the next 24 hours. The medicines used in the exam will make you sleepy. You will not be able to " drive.      You cannot take public transportation, ride share services, or non-medical taxi service without a responsible caregiver.  Medical transport services are allowed with the requirement that a responsible caregiver will receive you at your destination.  We require that drivers and caregivers are confirmed prior to your procedure.

## 2024-08-28 NOTE — TELEPHONE ENCOUNTER
Pre Assessment RN Review    Focused Assessments    Cardiac Review      Has the patient had a stent placed in the last year?  No. Patient has hx of hospitalization for a cardiac event/procedure in the last 6 months. Contact cardiologist and ordering provider to discuss appropriateness of procedure and recommended delay of procedure for at least 6 months. Appropriateness of procedure will be reevaluated according to provider recommendation.      Coronary artery disease, s/p CABG x4 7/30  Atrial fibrillation  s/p bilateral PVI with radiofrequency ablation with Atricure device (modified MAZE procedure) and left atrial appendage exclusion 7/30/24. On Eliquis    Scheduling Status & Recommendations    Delay scheduling, awaiting clinical input. Message sent to cardiology and ordering provider,  for review.   -------------------------------------------------------------------------------------------------------------    Per :  Sean Rincon MD Thompson, Mary, RN  Cc: Deniz Wilson MD  Given the ablation, it will be best to wait 6 months and then do this with anticoagulation hold provided that is acceptable with cardiology.        Cassie Webb RN

## 2024-08-29 NOTE — TELEPHONE ENCOUNTER
Spoke with patient today and informed them that they would like to delay this until after 1/30/25.  schedules are not out yet for [facility] so I will give him a call when they do become available.

## 2024-10-06 ENCOUNTER — HEALTH MAINTENANCE LETTER (OUTPATIENT)
Age: 74
End: 2024-10-06

## 2024-11-05 ENCOUNTER — TELEPHONE (OUTPATIENT)
Dept: GASTROENTEROLOGY | Facility: CLINIC | Age: 74
End: 2024-11-05
Payer: MEDICARE

## 2024-11-05 NOTE — TELEPHONE ENCOUNTER
Patient and his wife calling in today to see if he can have his flex sig done sooner then January 2025 due to recent heart surgery. I sent for review to nurses again to see if this can be done at [facility] before then. I will call his wife Cassie back once we know if he can schedule before then or not.

## 2024-11-05 NOTE — TELEPHONE ENCOUNTER
"Per Ping Navarro, Service  within GI: \"After reviewing the patient's chart, I would say to move it up. It is only a flex sig. This said, I have messaged Dr. Rincon.\"    Per Dr. Rincon email to Ping: \"We can move him up. Joanie sent me a message as well in Epic. We can try for 12/6 add on after the AM block if that works. Full bowel prep for him. Thanks. \"  "

## 2024-11-05 NOTE — TELEPHONE ENCOUNTER
Certificate of Child Health Examination     Student’s Name    Maya WANG  Last                     First                         Middle  Birth Date  (Mo/Day/Yr)    7/12/2025 Sex  Male   Race/Ethnicity   School/Grade Level/ID#      [id number] [id number]  Street Address                                 City                                Zip Code   Parent/Guardian                                                                   Telephone (home/work)   HEALTH HISTORY: MUST BE COMPLETED AND SIGNED BY PARENT/GUARDIAN AND VERIFIED BY HEALTH CARE PROVIDER     ALLERGIES (Food, drug, insect, other):   Patient has no known allergies.  MEDICATION (List all prescribed or taken on a regular basis) currently has no medications in their medication list.     Diagnosis of asthma?  Child wakes during the night coughing? [] Yes    [] No  [] Yes    [] No  Loss of function of one of paired organs? (eye/ear/kidney/testicle) [] Yes    [] No    Birth defects? [] Yes    [] No  Hospitalizations?  When?  What for? [] Yes    [] No    Developmental delay? [] Yes    [] No       Blood disorders?  Hemophilia,  Sickle Cell, Other?  Explain [] Yes    [] No  Surgery? (List all.)  When?  What for? [] Yes    [] No    Diabetes? [] Yes    [] No  Serious injury or illness? [] Yes    [] No    Head injury/Concussion/Passed out? [] Yes    [] No  TB skin test positive (past/present)? [] Yes    [] No *If yes, refer to local health department   Seizures?  What are they like? [] Yes    [] No  TB disease (past or present)? [] Yes    [] No    Heart problem/Shortness of breath? [] Yes    [] No  Tobacco use (type, frequency)? [] Yes    [] No    Heart murmur/High blood pressure? [] Yes    [] No  Alcohol/Drug use? [] Yes    [] No    Dizziness or chest pain with exercise? [] Yes    [] No  Family history of sudden death  before age 50? (Cause?) [] Yes    [] No    Eye/Vision problems? [] Yes [] No  Glasses [] Contacts[]  Final Scheduling Details     Procedure scheduled  Flexible sigmoidoscopy with RFA    Surgeon:  Sergey     Date of procedure:  12/6     Pre-OP / PAC:   No - Not required for this site.    Location  [facility] - Per order.    Sedation   Moderate Sedation - Per order.      Patient Reminders:   You will receive a call from a Nurse to review instructions and health history.  This assessment must be completed prior to your procedure.  Failure to complete the Nurse assessment may result in the procedure being cancelled.      On the day of your procedure, please designate an adult(s) who can drive you home stay with you for the next 24 hours. The medicines used in the exam will make you sleepy. You will not be able to drive.      You cannot take public transportation, ride share services, or non-medical taxi service without a responsible caregiver.  Medical transport services are allowed with the requirement that a responsible caregiver will receive you at your destination.  We require that drivers and caregivers are confirmed prior to your procedure.    Last exam by eye doctor________ Dental    [] Braces    [] Bridge    [] Plate  []  Other:    Other concerns? (crossed eye, drooping lids, squinting, difficulty reading) Additional Information:   Ear/Hearing problems? Yes[]No[]  Information may be shared with appropriate personnel for health and education purposes.  Patent/Guardian  Signature:                                                                 Date:   Bone/Joint problem/injury/scoliosis? Yes[]No[]     IMMUNIZATIONS: To be completed by health care provider. The mo/day/yr for every dose administered is required. If a specific vaccine is medically contraindicated, a separate written statement must be attached by the health care provider responsible for completing the health examination explaining the medical reason for the contraindication.   REQUIRED  VACCINE / DOSE DATE DATE DATE DATE   Diphtheria, Tetanus and Pertussis (DTP or DTap) 9/13/2024 11/15/2024 1/20/2025    Tdap       Td       Pediatric DT       Inactivate Polio (IPV) 9/13/2024 11/15/2024 1/20/2025    Oral Polio (OPV)       Haemophilus Influenza Type B (Hib) 9/13/2024 11/15/2024     Hepatitis B (HB) 7/12/2024 9/13/2024 11/15/2024 1/20/2025   Varicella (Chickenpox)       Combined Measles, Mumps and Rubella (MMR) 7/14/2025      Measles (Rubeola)       Rubella (3-day measles)       Mumps       Pneumococcal 9/13/2024 11/15/2024 1/20/2025 7/14/2025   Meningococcal Conjugate         RECOMMENDED, BUT NOT REQUIRED  VACCINE / DOSE   Hepatitis A                   7/14/2025   HPV   Influenza   Men B   Covid      Health care provider (MD, DO, APN, PA, school health professional, health official) verifying above immunization history must sign below.  If adding dates to the above immunization history section, put your initials by date(s) and sign here.    Signature                                                                                                                                                                                  Title_________DO_____________________________ Date 7/14/2025         Mando Trejo  Birth Date 7/12/2025 Sex Male School Grade Level/ID#        Certificates of Latter-day Exemption to Immunizations or Physician Medical Statements of Medical Contraindication  are reviewed and Maintained by the School Authority.   ALTERNATIVE PROOF OF IMMUNITY   1. Clinical diagnosis (measles, mumps, hepatitis B) is allowed when verified by physician and supported with lab confirmation.  Attach copy of lab result.  *MEASLES (Rubeola) (MO/DA/YR) ____________  **MUMPS (MO/DA/YR) ____________   HEPATITIS B (MO/DA/YR) ____________   VARICELLA (MO/DA/YR) ____________   2. History of varicella (chickenpox) disease is acceptable if verified by health care provider, school health professional or health official.    Person signing below verifies that the parent/guardian’s description of varicella disease history is indicative of past infection and is accepting such history as documentation of disease.     Date of Disease:   Signature:   Title:                          3. Laboratory Evidence of Immunity (check one) [] Measles     [] Mumps      [] Rubella      [] Hepatitis B      [] Varicella      Attach copy of lab result.   * All measles cases diagnosed on or after July 1, 2002, must be confirmed by laboratory evidence.  ** All mumps cases diagnosed on or after July 1, 2013, must be confirmed by laboratory evidence.  Physician Statements of Immunity MUST be submitted to ID for review.  Completion of Alternatives 1 or 3 MUST be accompanied by Labs & Physician Signature: __________________________________________________________________     PHYSICAL EXAMINATION REQUIREMENTS     Entire section below to be completed by MD//APN/PA   There were no vitals taken for this visit. No height and weight on file for this encounter.   DIABETES SCREENING: (NOT REQUIRED FOR DAY CARE)  BMI>85% age/sex No  And any two of the  following: Family History No  Ethnic Minority No Signs of Insulin Resistance (hypertension, dyslipidemia, polycystic ovarian syndrome, acanthosis nigricans) No At Risk No      LEAD RISK QUESTIONNAIRE: Required for children aged 6 months through 6 years enrolled in licensed or public-school operated day care, , nursery school and/or . (Blood test required if resides in [place] or high-risk zip code.)  Questionnaire Administered?  Yes               Blood Test Indicated?  No                Blood Test Date: _________________    Result: _____________________   TB SKIN OR BLOOD TEST: Recommended only for children in high-risk groups including children immunosuppressed due to HIV infection or other conditions, frequent travel to or born in high prevalence countries or those exposed to adults in high-risk categories. See CDC guidelines. http://www.cdc.gov/tb/publications/factsheets/testing/TB_testing.htm  No Test Needed   Skin test:   Date Read ___________________  Result            mm ___________                                                      Blood Test:   Date Reported: ____________________ Result:            Value ______________     LAB TESTS (Recommended) Date Results Screenings Date Results   Hemoglobin or Hematocrit   Developmental Screening  [] Completed  [] N/A   Urinalysis   Social and Emotional Screening  [] Completed  [] N/A   Sickle Cell (when indicated)   Other:       SYSTEM REVIEW Normal Comments/Follow-up/Needs SYSTEM REVIEW Normal Comments/Follow-up/Needs   Skin Yes  Endocrine Yes    Ears Yes                                           Screening Result: Gastrointestinal Yes    Eyes Yes                                           Screening Result: Genito-Urinary Yes                                                      LMP: No LMP for male patient.   Nose Yes  Neurological Yes    Throat Yes  Musculoskeletal Yes    Mouth/Dental Yes  Spinal Exam Yes    Cardiovascular/HTN Yes  Nutritional  Status Yes    Respiratory Yes  Mental Health Yes    Currently Prescribed Asthma Medication:           Quick-relief  medication (e.g. Short Acting Beta Antagonist): No          Controller medication (e.g. inhaled corticosteroid):   No Other     NEEDS/MODIFICATIONS: required in the school setting: None   DIETARY Needs/Restrictions: None   SPECIAL INSTRUCTIONS/DEVICES e.g., safety glasses, glass eye, chest protector for arrhythmia, pacemaker, prosthetic device, dental bridge, false teeth, athletic support/cup)  None   MENTAL HEALTH/OTHER Is there anything else the school should know about this student? No  If you would like to discuss this student's health with school or school health personnel, check title: [] Nurse  [] Teacher  [] Counselor  [] Principal   EMERGENCY ACTION PLAN: needed while at school due to child's health condition (e.g., seizures, asthma, insect sting, food, peanut allergy, bleeding problem, diabetes, heart problem?  No  If yes, please describe:   On the basis of the examination on this day, I approve this child's participation in                                        (If No or Modified please attach explanation.)  PHYSICAL EDUCATION   Yes                    INTERSCHOLASTIC SPORTS  Yes     Print Name: Ignacio Becerra DO                                                                                              Signature:                Date: 7/14/2025    Address: [address] [place] [address], [address]                                                                                                                                              Phone: [phone]

## 2024-11-26 ENCOUNTER — DOCUMENTATION ONLY (OUTPATIENT)
Dept: GASTROENTEROLOGY | Facility: CLINIC | Age: 74
End: 2024-11-26
Payer: MEDICARE

## 2024-11-26 ENCOUNTER — TELEPHONE (OUTPATIENT)
Dept: GASTROENTEROLOGY | Facility: CLINIC | Age: 74
End: 2024-11-26
Payer: MEDICARE

## 2024-11-26 NOTE — PROGRESS NOTES
Called [facility] Records in [place] to request records and had to send a faxed request. Faxed request for medical records & MAR (specifically anticoagulants) from 7/30/2024 on. Will call patient to follow up.  Lisa Graff

## 2024-11-26 NOTE — TELEPHONE ENCOUNTER
Called patient and he stated he is not on an anticoagulant.    His current MAR is:  Torsemide 10 mg daily  Allopurinol 10mg/100 daily  Tamsulosin 24 mg daily  Amiodarone 200 mg daily  Aspirin daily  Magnesium 64 mg 2x daily  Jardiance 10 mg daily  Rosuvastatin 20 mg daily  Losartan 12.5 mg daily  Metoprolol 25 mg daily  Oxybutynin 10 mg daily  Vitamin D3 1000 units daily    Lisa MCELROY MA

## 2024-12-06 ENCOUNTER — HOSPITAL ENCOUNTER (OUTPATIENT)
Facility: CLINIC | Age: 74
Discharge: HOME | End: 2024-12-06
Attending: INTERNAL MEDICINE | Admitting: INTERNAL MEDICINE
Payer: MEDICARE

## 2024-12-06 VITALS
SYSTOLIC BLOOD PRESSURE: 115 MMHG | RESPIRATION RATE: 16 BRPM | HEART RATE: 77 BPM | DIASTOLIC BLOOD PRESSURE: 60 MMHG | OXYGEN SATURATION: 99 %

## 2024-12-06 DIAGNOSIS — K62.7 RADIATION PROCTITIS: Primary | ICD-10-CM

## 2024-12-06 LAB
FLEXIBLE SIGMOIDOSCOPY: NORMAL
GLUCOSE BLDC GLUCOMTR-MCNC: 100 MG/DL (ref 70–99)

## 2024-12-06 PROCEDURE — G0500 MOD SEDAT ENDO SERVICE >5YRS: HCPCS | Performed by: INTERNAL MEDICINE

## 2024-12-06 PROCEDURE — 250N000011 HC RX IP 250 OP 636: Performed by: INTERNAL MEDICINE

## 2024-12-06 PROCEDURE — 99153 MOD SED SAME PHYS/QHP EA: CPT | Performed by: INTERNAL MEDICINE

## 2024-12-06 PROCEDURE — 999N000127 HC STATISTIC PERIPHERAL IV START W US GUIDANCE

## 2024-12-06 PROCEDURE — 82962 GLUCOSE BLOOD TEST: CPT

## 2024-12-06 PROCEDURE — 45346 SIGMOIDOSCOPY W/ABLATION: CPT | Performed by: INTERNAL MEDICINE

## 2024-12-06 RX ORDER — ONDANSETRON 4 MG/1
4 TABLET, ORALLY DISINTEGRATING ORAL EVERY 6 HOURS PRN
Status: DISCONTINUED | OUTPATIENT
Start: 2024-12-06 | End: 2024-12-06 | Stop reason: HOSPADM

## 2024-12-06 RX ORDER — NALOXONE HYDROCHLORIDE 0.4 MG/ML
0.4 INJECTION, SOLUTION INTRAMUSCULAR; INTRAVENOUS; SUBCUTANEOUS
Status: DISCONTINUED | OUTPATIENT
Start: 2024-12-06 | End: 2024-12-06 | Stop reason: HOSPADM

## 2024-12-06 RX ORDER — ONDANSETRON 2 MG/ML
4 INJECTION INTRAMUSCULAR; INTRAVENOUS
Status: DISCONTINUED | OUTPATIENT
Start: 2024-12-06 | End: 2024-12-06 | Stop reason: HOSPADM

## 2024-12-06 RX ORDER — NALOXONE HYDROCHLORIDE 0.4 MG/ML
0.2 INJECTION, SOLUTION INTRAMUSCULAR; INTRAVENOUS; SUBCUTANEOUS
Status: DISCONTINUED | OUTPATIENT
Start: 2024-12-06 | End: 2024-12-06 | Stop reason: HOSPADM

## 2024-12-06 RX ORDER — LIDOCAINE 40 MG/G
CREAM TOPICAL
Status: DISCONTINUED | OUTPATIENT
Start: 2024-12-06 | End: 2024-12-06 | Stop reason: HOSPADM

## 2024-12-06 RX ORDER — FLUMAZENIL 0.1 MG/ML
0.2 INJECTION, SOLUTION INTRAVENOUS
Status: DISCONTINUED | OUTPATIENT
Start: 2024-12-06 | End: 2024-12-06 | Stop reason: HOSPADM

## 2024-12-06 RX ORDER — FENTANYL CITRATE 50 UG/ML
INJECTION, SOLUTION INTRAMUSCULAR; INTRAVENOUS PRN
Status: DISCONTINUED | OUTPATIENT
Start: 2024-12-06 | End: 2024-12-06 | Stop reason: HOSPADM

## 2024-12-06 RX ORDER — PROCHLORPERAZINE MALEATE 5 MG/1
5 TABLET ORAL EVERY 6 HOURS PRN
Status: DISCONTINUED | OUTPATIENT
Start: 2024-12-06 | End: 2024-12-06 | Stop reason: HOSPADM

## 2024-12-06 RX ORDER — ONDANSETRON 2 MG/ML
4 INJECTION INTRAMUSCULAR; INTRAVENOUS EVERY 6 HOURS PRN
Status: DISCONTINUED | OUTPATIENT
Start: 2024-12-06 | End: 2024-12-06 | Stop reason: HOSPADM

## 2024-12-06 ASSESSMENT — ACTIVITIES OF DAILY LIVING (ADL)
ADLS_ACUITY_SCORE: 58

## 2024-12-06 NOTE — OR NURSING
Patient had flexible sigmoidoscopy with RFA of radiation proctitis. Patient tolerated procedure under conscious sedation and 2liters nasal cannula

## 2024-12-06 NOTE — H&P
ENDOSCOPY PRE-SEDATION H&P FOR OUTPATIENT PROCEDURES    Jorje Hutchinson  [id number]  1950    Procedure: flex sig    Pre-procedure diagnosis: radiation proctitis    Past medical history:   Past Medical History:   Diagnosis Date    CAD (coronary artery disease)     HLD (hyperlipidemia)     HTN (hypertension)     Morbid obesity (H)     HERRERA (obstructive sleep apnea)     Osteoarthritis     Proctitis     radiation induced    Prostate cancer (H)     SVT (supraventricular tachycardia) (H)        Past surgical history:   Past Surgical History:   Procedure Laterality Date    AS TOTAL KNEE ARTHROPLASTY Right     BYPASS GRAFT ARTERY CORONARY N/A 07/30/2024    Procedure: CORONARY ARTERY BYPASS GRAFT;  Surgeon: Deniz Wilson MD;  Location: [facility]    CARDIAC CATHERIZATION  2002    COLONOSCOPY  2006    CORONARY ANGIOPLASTY  2002    w/ stent    CV CORONARY ANGIOGRAM  05/22/2024    EXC SKIN BENIG >4CM REMAINDR BODY Right 1989    forearm & neck    MAZE PROCEDURE N/A 07/30/2024    Procedure: WINTER MAZE PROCEDURE;  Surgeon: Deniz Wilson MD;  Location: [facility] OR    OR LIGATION, LEFT ATRIAL APPENDAGE N/A 07/30/2024    Procedure: LIGATION, LEFT ATRIAL APPENDAGE, OPEN **LATEX ALLERGY**;  Surgeon: Deniz Wilson MD;  Location: [facility] OR    PICC DOUBLE LUMEN PLACEMENT Right 08/07/2024    52 cm DL picc placed basilic vein with 3 cm external  with some problem       No current facility-administered medications for this encounter.     Facility-Administered Medications Ordered in Other Encounters   Medication Dose Route Frequency Provider Last Rate Last Admin    [COMPLETED] potassium chloride garry ER (KLOR-CON M10) CR tablet 20 mEq  20 mEq Oral Once Deniz Wilson MD           Allergies   Allergen Reactions    Shellfish-Derived Products Anaphylaxis, Difficulty breathing and Photosensitivity    Shrimp      Other Reaction(s): Swelling of eye, Swelling of throat    Latex Blisters, Dermatitis, Hives, Itching and Rash       History of Anesthesia/Sedation  Problems: no    PHYSICAL EXAMINATION:  Constitutional: aaox3, cooperative, pleasant  Vitals reviewed: /53 (BP Location: Left arm)   Resp 18   SpO2 100%   Wt:   Wt Readings from Last 2 Encounters:   08/10/24 (!) 150.2 kg (331 lb 1.6 oz)   07/09/24 (!) 151.2 kg (333 lb 4.8 oz)      Eyes: Sclera anicteric/injected  Ears/nose/mouth/throat: Normal oropharynx without ulcers or exudate, mucus membranes moist, hearing intact  Neck: supple, thyroid normal size  CV: No edema  Respiratory: Unlabored breathing  Lymph: No submandibular, supraclavicular or inguinal lymphadenopathy  Abd: Nondistended, no masses, nontender  Skin: warm, perfused, no jaundice  Psych: Normal affect  MSK: normal movement on limited exam.    ASA Score: See Provation note    Assessment/Plan:     The patient is an appropriate candidate to receive sedation.    Informed consent was discussed with the patient/family, including the risks, benefits, potential complications and any alternative options associated with sedation.    Patient assessment completed just prior to sedation and while under constant observation by the provider. Condition determined to be adequate for proceeding with sedation.    The specific risks for the procedure were discussed with the patient at the time of informed consent and include but are not limited to perforation which could require surgery, missing significant neoplasm or lesion, hemorrhage and adverse sedative complication.      Sean Rincon MD

## 2025-01-19 ENCOUNTER — HEALTH MAINTENANCE LETTER (OUTPATIENT)
Age: 75
End: 2025-01-19

## 2025-01-22 ENCOUNTER — TELEPHONE (OUTPATIENT)
Dept: GASTROENTEROLOGY | Facility: CLINIC | Age: 75
End: 2025-01-22
Payer: MEDICARE

## 2025-01-22 NOTE — TELEPHONE ENCOUNTER
"Endoscopy Scheduling Screen    Have you had any respiratory illness or flu-like symptoms in the last 10 days?  No    What is your communication preference for Instructions and/or Bowel Prep?   MyChart    What insurance is in the chart?  Other:  BCBS    Ordering/Referring Provider:     LEON MERAZ      (If ordering provider performs procedure, schedule with ordering provider unless otherwise instructed. )    BMI: Estimated body mass index is 49.61 kg/m  as calculated from the following:    Height as of 7/30/24: 1.74 m (5' 8.5\").    Weight as of 8/10/24: 150.2 kg (331 lb 1.6 oz).     Sedation Ordered  moderate sedation.   If patient BMI > 50 do not schedule in [facility].    If patient BMI > 45 do not schedule at [facility].    Are you taking methadone or Suboxone?  NO, No RN review required.    Have you been diagnosed and are being treated for severe PTSD or severe anxiety?  NO, No RN review required.    Are you taking any prescription medications for pain 3 or more times per week?   NO, No RN review required.    Do you have a history of malignant hyperthermia?  No    (Females) Are you currently pregnant?        Have you been diagnosed or told you have pulmonary hypertension?   No    Do you have an LVAD?  No    Have you been told you have moderate to severe sleep apnea?  Yes. Do you use a CPAP? Yes Where is the patient located?. (RN Review required for scheduling unless scheduling in Hospital.)     Have you been told you have COPD, asthma, or any other lung disease?  No    Do you have any heart conditions?  Yes     In the past year, have you had any hospitalizations for heart related issues including cardiomyopathy, heart attack, or stent placement?  No    Do you have any implantable devices in your body (pacemaker, ICD)?  No    Do you take nitroglycerine?  No    Have you ever had or are you waiting for an organ transplant?  No. Continue scheduling, no site restrictions.    Have you had a stroke or transient " "ischemic attack (TIA aka \"mini stroke\" in the last 6 months?   No    Have you been diagnosed with or been told you have cirrhosis of the liver?   No.    Are you currently on dialysis?   No    Do you need assistance transferring?   No    BMI: Estimated body mass index is 49.61 kg/m  as calculated from the following:    Height as of 7/30/24: 1.74 m (5' 8.5\").    Weight as of 8/10/24: 150.2 kg (331 lb 1.6 oz).     Is patients BMI > 40 and scheduling location [facility]?  Yes (If MAC sedation is ordered, schedule PAC eval)    Do you take an injectable or oral medication for weight loss or diabetes (excluding insulin)?  Yes, hold time can be up to 7 days. Please consult with you prescribing provider to discuss endoscopy recommendations. (Please schedule at least 7 days out.)    Do you take the medication Naltrexone?  No    Do you take blood thinners?  No       Prep   Are you currently on dialysis or do you have chronic kidney disease?  No    Do you have a diagnosis of diabetes?  Yes (Golytely Prep)    Do you have a diagnosis of cystic fibrosis (CF)?  No    On a regular basis do you go 3 -5 days between bowel movements?  No    BMI > 40?  Yes (Extended Prep)    Preferred Pharmacy:    [address] [address]  Phone: [phone] Fax: [phone]      Final Scheduling Details     Procedure scheduled  Colonoscopy    Surgeon:  KT     Date of procedure:  4/4     Pre-OP / PAC:   No - Not required for this site.    Location  [facility] - Per order.    Sedation   Moderate Sedation - Per order.      Patient Reminders:   You will receive a call from a Nurse to review instructions and health history.  This assessment must be completed prior to your procedure.  Failure to complete the Nurse assessment may result in the procedure being cancelled.      On the day of your procedure, please designate an adult(s) who can drive you home stay with you for the next " 24 hours. The medicines used in the exam will make you sleepy. You will not be able to drive.      You cannot take public transportation, ride share services, or non-medical taxi service without a responsible caregiver.  Medical transport services are allowed with the requirement that a responsible caregiver will receive you at your destination.  We require that drivers and caregivers are confirmed prior to your procedure.

## 2025-03-04 NOTE — PROCEDURE: COUNSELING
Quality 137: Melanoma: Continuity Of Care - Recall System: Patient information entered into a recall system that includes: target date for the next exam specified AND a process to follow up with patients regarding missed or unscheduled appointments
When Should The Patient Follow-Up For Their Next Full-Body Skin Exam?: 6 Months
Detail Level: Detailed
Detail Level: Zone
Post procedure/Weakness

## 2025-04-04 ENCOUNTER — HOSPITAL ENCOUNTER (OUTPATIENT)
Facility: CLINIC | Age: 75
Discharge: HOME | End: 2025-04-04
Attending: INTERNAL MEDICINE | Admitting: INTERNAL MEDICINE
Payer: MEDICARE

## 2025-04-04 VITALS
DIASTOLIC BLOOD PRESSURE: 64 MMHG | SYSTOLIC BLOOD PRESSURE: 115 MMHG | HEART RATE: 81 BPM | OXYGEN SATURATION: 97 % | RESPIRATION RATE: 18 BRPM

## 2025-04-04 DIAGNOSIS — K62.7 RADIATION PROCTITIS: Primary | ICD-10-CM

## 2025-04-04 LAB
FLEXIBLE SIGMOIDOSCOPY: NORMAL
GLUCOSE BLDC GLUCOMTR-MCNC: 135 MG/DL (ref 70–99)

## 2025-04-04 PROCEDURE — G0500 MOD SEDAT ENDO SERVICE >5YRS: HCPCS | Performed by: INTERNAL MEDICINE

## 2025-04-04 PROCEDURE — 99153 MOD SED SAME PHYS/QHP EA: CPT | Performed by: INTERNAL MEDICINE

## 2025-04-04 PROCEDURE — 45346 SIGMOIDOSCOPY W/ABLATION: CPT | Performed by: INTERNAL MEDICINE

## 2025-04-04 PROCEDURE — 250N000011 HC RX IP 250 OP 636: Performed by: INTERNAL MEDICINE

## 2025-04-04 PROCEDURE — 82962 GLUCOSE BLOOD TEST: CPT

## 2025-04-04 RX ORDER — ONDANSETRON 4 MG/1
4 TABLET, ORALLY DISINTEGRATING ORAL EVERY 6 HOURS PRN
Status: DISCONTINUED | OUTPATIENT
Start: 2025-04-04 | End: 2025-04-04 | Stop reason: HOSPADM

## 2025-04-04 RX ORDER — ONDANSETRON 2 MG/ML
4 INJECTION INTRAMUSCULAR; INTRAVENOUS EVERY 6 HOURS PRN
Status: DISCONTINUED | OUTPATIENT
Start: 2025-04-04 | End: 2025-04-04 | Stop reason: HOSPADM

## 2025-04-04 RX ORDER — PROCHLORPERAZINE MALEATE 5 MG/1
5 TABLET ORAL EVERY 6 HOURS PRN
Status: DISCONTINUED | OUTPATIENT
Start: 2025-04-04 | End: 2025-04-04 | Stop reason: HOSPADM

## 2025-04-04 RX ORDER — NALOXONE HYDROCHLORIDE 0.4 MG/ML
0.2 INJECTION, SOLUTION INTRAMUSCULAR; INTRAVENOUS; SUBCUTANEOUS
Status: DISCONTINUED | OUTPATIENT
Start: 2025-04-04 | End: 2025-04-04 | Stop reason: HOSPADM

## 2025-04-04 RX ORDER — LIDOCAINE 40 MG/G
CREAM TOPICAL
Status: DISCONTINUED | OUTPATIENT
Start: 2025-04-04 | End: 2025-04-04 | Stop reason: HOSPADM

## 2025-04-04 RX ORDER — ONDANSETRON 2 MG/ML
4 INJECTION INTRAMUSCULAR; INTRAVENOUS
Status: DISCONTINUED | OUTPATIENT
Start: 2025-04-04 | End: 2025-04-04 | Stop reason: HOSPADM

## 2025-04-04 RX ORDER — FENTANYL CITRATE 50 UG/ML
INJECTION, SOLUTION INTRAMUSCULAR; INTRAVENOUS PRN
Status: DISCONTINUED | OUTPATIENT
Start: 2025-04-04 | End: 2025-04-04 | Stop reason: HOSPADM

## 2025-04-04 RX ORDER — NALOXONE HYDROCHLORIDE 0.4 MG/ML
0.4 INJECTION, SOLUTION INTRAMUSCULAR; INTRAVENOUS; SUBCUTANEOUS
Status: DISCONTINUED | OUTPATIENT
Start: 2025-04-04 | End: 2025-04-04 | Stop reason: HOSPADM

## 2025-04-04 RX ORDER — FLUMAZENIL 0.1 MG/ML
0.2 INJECTION, SOLUTION INTRAVENOUS
Status: DISCONTINUED | OUTPATIENT
Start: 2025-04-04 | End: 2025-04-04 | Stop reason: HOSPADM

## 2025-04-04 ASSESSMENT — ACTIVITIES OF DAILY LIVING (ADL)
ADLS_ACUITY_SCORE: 58

## 2025-04-04 NOTE — H&P
ENDOSCOPY PRE-SEDATION H&P FOR OUTPATIENT PROCEDURES    Jorje Hutchinson  [id number]  1950    Procedure: flex sig    Pre-procedure diagnosis: radiation proctitis    Past medical history:   Past Medical History:   Diagnosis Date    CAD (coronary artery disease)     HLD (hyperlipidemia)     HTN (hypertension)     Morbid obesity (H)     HERRERA (obstructive sleep apnea)     Osteoarthritis     Proctitis     radiation induced    Prostate cancer (H)     SVT (supraventricular tachycardia)        Past surgical history:   Past Surgical History:   Procedure Laterality Date    AS TOTAL KNEE ARTHROPLASTY Right     BYPASS GRAFT ARTERY CORONARY N/A 07/30/2024    Procedure: CORONARY ARTERY BYPASS GRAFT;  Surgeon: Deniz Wilson MD;  Location: [facility]    CARDIAC CATHERIZATION  2002    COLONOSCOPY  2006    CORONARY ANGIOPLASTY  2002    w/ stent    CV CORONARY ANGIOGRAM  05/22/2024    EXC SKIN BENIG >4CM REMAINDR BODY Right 1989    forearm & neck    MAZE PROCEDURE N/A 07/30/2024    Procedure: WINTER MAZE PROCEDURE;  Surgeon: Deniz Wilson MD;  Location: [facility]    OR LIGATION, LEFT ATRIAL APPENDAGE N/A 07/30/2024    Procedure: LIGATION, LEFT ATRIAL APPENDAGE, OPEN **LATEX ALLERGY**;  Surgeon: eDniz Wilson MD;  Location: [facility]    PICC DOUBLE LUMEN PLACEMENT Right 08/07/2024    52 cm DL picc placed basilic vein with 3 cm external  with some problem       Current Facility-Administered Medications   Medication Dose Route Frequency Provider Last Rate Last Admin    fentaNYL (PF) (SUBLIMAZE) injection   Intravenous PRN Sean Rincon MD   50 mcg at 04/04/25 0924    midazolam (VERSED) injection   Intravenous PRN Sean Rincon MD   1 mg at 04/04/25 0924     Facility-Administered Medications Ordered in Other Encounters   Medication Dose Route Frequency Provider Last Rate Last Admin    [COMPLETED] potassium chloride garry ER (KLOR-CON M10) CR tablet 20 mEq  20 mEq Oral Once Deniz Wilson MD           Allergies   Allergen  Reactions    Shellfish-Derived Products Anaphylaxis, Difficulty breathing and Photosensitivity    Shrimp      Other Reaction(s): Swelling of eye, Swelling of throat    Latex Blisters, Dermatitis, Hives, Itching and Rash       History of Anesthesia/Sedation Problems: no    PHYSICAL EXAMINATION:  Constitutional: aaox3, cooperative, pleasant  Vitals reviewed: /60   Pulse 77   Resp 15   SpO2 98%   Wt:   Wt Readings from Last 2 Encounters:   08/10/24 (!) 150.2 kg (331 lb 1.6 oz)   07/09/24 (!) 151.2 kg (333 lb 4.8 oz)      Eyes: Sclera anicteric/injected  Ears/nose/mouth/throat: Normal oropharynx without ulcers or exudate, mucus membranes moist, hearing intact  Neck: supple, thyroid normal size  CV: No edema  Respiratory: Unlabored breathing  Lymph: No submandibular, supraclavicular or inguinal lymphadenopathy  Abd: Nondistended, no masses, nontender  Skin: warm, perfused, no jaundice  Psych: Normal affect  MSK: normal movement on limited exam.    ASA Score: See Provation note    Assessment/Plan:     The patient is an appropriate candidate to receive sedation.    Informed consent was discussed with the patient/family, including the risks, benefits, potential complications and any alternative options associated with sedation.    Patient assessment completed just prior to sedation and while under constant observation by the provider. Condition determined to be adequate for proceeding with sedation.    The specific risks for the procedure were discussed with the patient at the time of informed consent and include but are not limited to perforation which could require surgery, missing significant neoplasm or lesion, hemorrhage and adverse sedative complication.      Sean Rincon MD

## 2025-04-04 NOTE — OR NURSING
Pt underwent flexible sigmoidoscopy with RFA under conscious sedation. Specimens: none. Pt transferred to recovery and report given to endo RN.       Virginia Mora RN

## 2025-04-11 ENCOUNTER — HOSPITAL ENCOUNTER (OUTPATIENT)
Facility: CLINIC | Age: 75
End: 2025-04-11
Attending: INTERNAL MEDICINE | Admitting: INTERNAL MEDICINE
Payer: MEDICARE

## 2025-04-27 ENCOUNTER — HEALTH MAINTENANCE LETTER (OUTPATIENT)
Age: 75
End: 2025-04-27

## 2025-06-02 ENCOUNTER — APPOINTMENT (OUTPATIENT)
Dept: URBAN - METROPOLITAN AREA CLINIC 41 | Facility: CLINIC | Age: 75
Setting detail: DERMATOLOGY
End: 2025-06-02

## 2025-06-02 DIAGNOSIS — Z85.820 PERSONAL HISTORY OF MALIGNANT MELANOMA OF SKIN: ICD-10-CM

## 2025-06-02 DIAGNOSIS — D22 MELANOCYTIC NEVI: ICD-10-CM

## 2025-06-02 DIAGNOSIS — L82.1 OTHER SEBORRHEIC KERATOSIS: ICD-10-CM

## 2025-06-02 DIAGNOSIS — Z85.828 PERSONAL HISTORY OF OTHER MALIGNANT NEOPLASM OF SKIN: ICD-10-CM

## 2025-06-02 DIAGNOSIS — L81.4 OTHER MELANIN HYPERPIGMENTATION: ICD-10-CM

## 2025-06-02 PROBLEM — D22.5 MELANOCYTIC NEVI OF TRUNK: Status: ACTIVE | Noted: 2025-06-02

## 2025-06-02 PROBLEM — D23.72 OTHER BENIGN NEOPLASM OF SKIN OF LEFT LOWER LIMB, INCLUDING HIP: Status: ACTIVE | Noted: 2025-06-02

## 2025-06-02 PROCEDURE — ? COUNSELING

## 2025-06-02 PROCEDURE — ? SUNSCREEN RECOMMENDATIONS

## 2025-06-02 ASSESSMENT — LOCATION ZONE DERM
LOCATION ZONE: FACE
LOCATION ZONE: EAR
LOCATION ZONE: SCALP
LOCATION ZONE: ARM
LOCATION ZONE: TRUNK
LOCATION ZONE: LEG

## 2025-06-02 ASSESSMENT — LOCATION SIMPLE DESCRIPTION DERM
LOCATION SIMPLE: ABDOMEN
LOCATION SIMPLE: RIGHT UPPER BACK
LOCATION SIMPLE: POSTERIOR SCALP
LOCATION SIMPLE: RIGHT EAR
LOCATION SIMPLE: LEFT FOREHEAD
LOCATION SIMPLE: RIGHT FOREARM
LOCATION SIMPLE: LEFT UPPER BACK
LOCATION SIMPLE: LEFT CHEEK
LOCATION SIMPLE: RIGHT THIGH
LOCATION SIMPLE: RIGHT FOREHEAD

## 2025-06-02 ASSESSMENT — LOCATION DETAILED DESCRIPTION DERM
LOCATION DETAILED: LEFT LATERAL ABDOMEN
LOCATION DETAILED: LEFT INFERIOR LATERAL MALAR CHEEK
LOCATION DETAILED: RIGHT LATERAL ABDOMEN
LOCATION DETAILED: POSTERIOR MID-PARIETAL SCALP
LOCATION DETAILED: RIGHT INFERIOR FOREHEAD
LOCATION DETAILED: RIGHT DISTAL RADIAL DORSAL FOREARM
LOCATION DETAILED: RIGHT SUPERIOR CRUS OF ANTIHELIX
LOCATION DETAILED: LEFT INFERIOR MEDIAL UPPER BACK
LOCATION DETAILED: RIGHT ANTERIOR PROXIMAL THIGH
LOCATION DETAILED: RIGHT SUPERIOR LATERAL UPPER BACK
LOCATION DETAILED: LEFT SUPERIOR FOREHEAD
LOCATION DETAILED: RIGHT SUPERIOR LATERAL FOREHEAD

## 2025-06-20 ENCOUNTER — TELEPHONE (OUTPATIENT)
Dept: GASTROENTEROLOGY | Facility: CLINIC | Age: 75
End: 2025-06-20
Payer: MEDICARE

## 2025-06-20 NOTE — TELEPHONE ENCOUNTER
"Incoming call from patient with question on whether or not patient needs to have his next flex sig that is scheduled for 7/25/25. Per patient's previous 4/4/25 flex sig notes, \"Repeat flexible sigmoidoscopy in 3 months for retreatment\". Patient states patient does not have any symptoms and does not think he needs a re-treatment. Patient states patient does not want to have to complete procedure again if patient does not have to.     Routing message to Dr. Rincon per patient request to please contact patient at [phone] regarding question above. Thank you.      Justin Ballesteros, RN  Endoscopy Procedure Pre Assessment  [phone] option 3    "

## 2025-06-23 NOTE — TELEPHONE ENCOUNTER
Spoke to Vincent, gave recommendations from Dr. Rincon:he can cancel his flexible sigmoidoscopy if he is no longer having bleeding. He should contact us in the future for another session of treatment if symptoms recur.   Vincent voices understanding and appreciation.

## 2025-07-02 ENCOUNTER — TELEPHONE (OUTPATIENT)
Dept: GASTROENTEROLOGY | Facility: CLINIC | Age: 75
End: 2025-07-02
Payer: MEDICARE

## 2025-07-02 NOTE — TELEPHONE ENCOUNTER
Caller: Vincent    Reason for Reschedule/Cancellation (please be detailed, any staff messages or encounters to note?):   Patient no longer needs-see notes in chart    Did you cancel or rescheduled an EUS procedure? No.    Is screening questionnaire older than 3 months from the reschedule date.   If Yes, please complete screening questionnaire. No    Prior to reschedule please review:  Ordering Provider: Sergey  Sedation Determined: CS  Does patient have any ASC Exclusions, please identify?: Yes-BMI, HERRERA, heart issues    Notes on Cancelled Procedure:  Procedure: FLEXIBLE SIGMOIDOSCOPY [Flex Sig]   Date: 7/25/25  Location: [facility]; [address], [address], [place] [address]   Surgeon: Sergey    Rescheduled: No,

## 2025-08-01 ENCOUNTER — APPOINTMENT (OUTPATIENT)
Dept: URBAN - METROPOLITAN AREA CLINIC 41 | Facility: CLINIC | Age: 75
Setting detail: DERMATOLOGY
End: 2025-08-01

## 2025-08-01 DIAGNOSIS — L82.0 INFLAMED SEBORRHEIC KERATOSIS: ICD-10-CM | Status: INADEQUATELY CONTROLLED

## 2025-08-01 PROCEDURE — ? LIQUID NITROGEN

## 2025-08-01 ASSESSMENT — LOCATION SIMPLE DESCRIPTION DERM
LOCATION SIMPLE: ABDOMEN
LOCATION SIMPLE: RIGHT FOREARM

## 2025-08-01 ASSESSMENT — LOCATION DETAILED DESCRIPTION DERM
LOCATION DETAILED: PERIUMBILICAL SKIN
LOCATION DETAILED: RIGHT DISTAL DORSAL FOREARM

## 2025-08-01 ASSESSMENT — LOCATION ZONE DERM
LOCATION ZONE: ARM
LOCATION ZONE: TRUNK

## 2025-08-10 ENCOUNTER — HEALTH MAINTENANCE LETTER (OUTPATIENT)
Age: 75
End: 2025-08-10

## (undated) DEVICE — CLIP HORIZON LG ORANGE 004200

## (undated) DEVICE — TAPE MEDIPORE 4"X2YD 2864

## (undated) DEVICE — DRAIN CHEST TUBE RIGHT ANGLED 28FR 8128

## (undated) DEVICE — SPONGE LAP 12X12" X8425

## (undated) DEVICE — KIT ENDO VASOVIEW HEMOPRO 2 VH-4000

## (undated) DEVICE — SOL WATER IRRIG 1000ML BOTTLE 2F7114

## (undated) DEVICE — SU PROLENE 5-0 RB-2DA 30" 8710H

## (undated) DEVICE — ESU GROUND PAD ADULT W/CORD E7507

## (undated) DEVICE — SU ETHIBOND 2-0 SHDA 30" X563H

## (undated) DEVICE — ESU PENCIL SMOKE EVAC W/ROCKER SWITCH 0703-047-000

## (undated) DEVICE — GLOVE BIOGEL PI SZ 7.0 40870

## (undated) DEVICE — DRAPE FLUID WARMING 52 X 60" ORS-321

## (undated) DEVICE — SU RETRACT-O-TAPE 1041

## (undated) DEVICE — DECANTER BAG 2002S

## (undated) DEVICE — TIES BANDING T50R

## (undated) DEVICE — SU PROLENE 7-0 BV-1DA 4X24" M8702

## (undated) DEVICE — DRSG ABDOMINAL 07 1/2X8" 7197D

## (undated) DEVICE — PROTECTOR ARM ONE-STEP TRENDELENBURG 40418

## (undated) DEVICE — SOL NACL 0.9% IRRIG 1000ML BOTTLE 2F7124

## (undated) DEVICE — SU ETHIBOND 3-0 BBDA 36" X588H

## (undated) DEVICE — DRSG TELFA 3X8" 1238

## (undated) DEVICE — ESU ELEC BLADE E-SEP INSULATED NEPTUNE 70MM 0703-070-002

## (undated) DEVICE — TUBING SUCTION DRAINAGE PLEURAL DUAL 8884714200

## (undated) DEVICE — TEMPLATE ATRICLIP STRATER KIT CLOSURE DEVICE STND LFT ATRL

## (undated) DEVICE — SU PROLENE 6-0 C-1DA 30" 8706H

## (undated) DEVICE — PREP CHLORAPREP 26ML TINTED HI-LITE ORANGE 930815

## (undated) DEVICE — SOL NACL 0.9% 10ML VIAL 0409-4888-02

## (undated) DEVICE — SU PROLENE 4-0 RB-1DA 36" 8557H

## (undated) DEVICE — SU VICRYL+ 3-0 FS1 27IN UND VCP442H

## (undated) DEVICE — BLADE KNIFE BEAVER MICROSHARP GREEN 377515

## (undated) DEVICE — CLIP HORIZON SM RED WIDE SLOT 001201

## (undated) DEVICE — SYR 01ML 27GA 0.5" NDL TBC 309623

## (undated) DEVICE — CANNULA VESSEL 3ML BEVELED DPL 30000

## (undated) DEVICE — SU STEEL MYO/WIRE II STERNOTOMY 8 BE-1 3X14" 048-217

## (undated) DEVICE — DRAIN CHEST TUBE 36FR STR 8036

## (undated) DEVICE — SU STEEL 6 CCS 4X18" M654G

## (undated) DEVICE — BLADE SAW STRK STERNAL 6207-97-101

## (undated) DEVICE — SURGICEL HEMOSTAT 4X8" 1952

## (undated) DEVICE — DRSG DRAIN 4X4" 7086

## (undated) DEVICE — CLIP HORIZON MED BLUE 002200

## (undated) DEVICE — SU PROLENE 4-0 SHDA 36" 8521H

## (undated) DEVICE — SUCTION DRY CHEST DRAIN OASIS 3600-100

## (undated) DEVICE — RX SURGIFLO HEMOSTATIC MATRIX W/THROMBIN 8ML 2994

## (undated) DEVICE — DEVICE TISSUE STABILIZATION OCTOBASE 28707

## (undated) DEVICE — SU VICRYL 2-0 CT-1 27" UND J259H

## (undated) DEVICE — DRSG TEGADERM 8X12" 1629

## (undated) DEVICE — SPECIMEN CONTAINER 5OZ STERILE 2600SA

## (undated) DEVICE — SUCTION CATH AIRLIFE TRI-FLO W/CONTROL PORT 14FR  T60C

## (undated) DEVICE — WIPES FOLEY CARE SURESTEP PROVON DFC100

## (undated) DEVICE — SU SILK 0 TIE 6X30" A306H

## (undated) DEVICE — DEFIB PRO-PADZ LVP LQD GEL ADULT 8900-2105-01

## (undated) DEVICE — SU VICRYL 0 CTX 36" J370H

## (undated) DEVICE — SU PLEDGET SOFT TFE 3/8"X3/26"X1/16" PCP40

## (undated) DEVICE — DRAPE IOBAN INCISE 23X17" 6650EZ

## (undated) DEVICE — SOL NACL 0.9% IRRIG 3000ML BAG 2B7477

## (undated) DEVICE — BNDG ELASTIC 6"X5YDS STERILE 6611-6S

## (undated) DEVICE — HANDPIECE ABLATION OPEN LONG JAW LT CURVE  OLL2

## (undated) DEVICE — BLADE KNIFE BEAVER MINI STR BEAVER6900

## (undated) DEVICE — PACK ADULT HEART UMMC PV15CG92D

## (undated) DEVICE — SOL NACL 0.9% INJ 1000ML BAG 2B1324X

## (undated) DEVICE — ANTIFOG SOLUTION W/FOAM PAD 31142527

## (undated) DEVICE — LINEN TOWEL PACK X6 WHITE 5487

## (undated) DEVICE — PUNCH AORTIC 4.0MMX8" RCB40

## (undated) DEVICE — SU ETHIBOND 0 CT-1 CR 8X18" CX21D

## (undated) DEVICE — SU DERMABOND ADVANCED .7ML DNX12

## (undated) DEVICE — SUCTION MANIFOLD NEPTUNE 2 SYS 4 PORT 0702-020-000

## (undated) DEVICE — BNDG ELASTIC 4"X5YDS STERILE 6611-4S

## (undated) DEVICE — SPONGE RAY-TEC 4X8" 7318

## (undated) DEVICE — BONE WAX 2.5GM W31G

## (undated) DEVICE — WAND SUCTION LP SOFT 15.2CM SU-22702

## (undated) DEVICE — SU PROLENE 6-0 C-1DA 4X24" M8726

## (undated) DEVICE — LINEN TOWEL PACK X30 5481

## (undated) DEVICE — CONNECTOR SIMS TUBING FOR CHEST TUBES 361

## (undated) DEVICE — ESU ELEC BLADE E-SEP INSULATED NEPTUNE 165MM 0703-165-002

## (undated) DEVICE — BLADE KNIFE SURG 15C 371716

## (undated) DEVICE — SU PROLENE 3-0 SHDA 36" 8522H

## (undated) DEVICE — TUBING INSUFFLATION PNEUMOCLEAR 0620050100

## (undated) DEVICE — SUCTION SLEEVE NEPTUNE 2 165MM 0703-005-165

## (undated) RX ORDER — FENTANYL CITRATE 50 UG/ML
INJECTION, SOLUTION INTRAMUSCULAR; INTRAVENOUS
Status: DISPENSED
Start: 2024-07-30

## (undated) RX ORDER — HEPARIN SODIUM 1000 [USP'U]/ML
INJECTION, SOLUTION INTRAVENOUS; SUBCUTANEOUS
Status: DISPENSED
Start: 2024-07-30

## (undated) RX ORDER — FAMOTIDINE 20 MG/1
TABLET, FILM COATED ORAL
Status: DISPENSED
Start: 2024-07-30

## (undated) RX ORDER — ASPIRIN 81 MG/1
TABLET, CHEWABLE ORAL
Status: DISPENSED
Start: 2024-07-30

## (undated) RX ORDER — HYDROMORPHONE HYDROCHLORIDE 1 MG/ML
INJECTION, SOLUTION INTRAMUSCULAR; INTRAVENOUS; SUBCUTANEOUS
Status: DISPENSED
Start: 2024-07-30

## (undated) RX ORDER — CEFAZOLIN SODIUM 1 G/3ML
INJECTION, POWDER, FOR SOLUTION INTRAMUSCULAR; INTRAVENOUS
Status: DISPENSED
Start: 2024-07-30

## (undated) RX ORDER — PROPOFOL 10 MG/ML
INJECTION, EMULSION INTRAVENOUS
Status: DISPENSED
Start: 2024-07-30

## (undated) RX ORDER — ACETAMINOPHEN 325 MG/1
TABLET ORAL
Status: DISPENSED
Start: 2024-07-30

## (undated) RX ORDER — METOPROLOL TARTRATE 25 MG/1
TABLET, FILM COATED ORAL
Status: DISPENSED
Start: 2024-07-30

## (undated) RX ORDER — CHLORHEXIDINE GLUCONATE ORAL RINSE 1.2 MG/ML
SOLUTION DENTAL
Status: DISPENSED
Start: 2024-07-30

## (undated) RX ORDER — GABAPENTIN 100 MG/1
CAPSULE ORAL
Status: DISPENSED
Start: 2024-07-30

## (undated) RX ORDER — CEFAZOLIN SODIUM/WATER 3 G/30 ML
SYRINGE (ML) INTRAVENOUS
Status: DISPENSED
Start: 2024-07-30

## (undated) RX ORDER — PAPAVERINE HYDROCHLORIDE 30 MG/ML
INJECTION INTRAMUSCULAR; INTRAVENOUS
Status: DISPENSED
Start: 2024-07-30

## (undated) RX ORDER — LIDOCAINE HYDROCHLORIDE 20 MG/ML
JELLY TOPICAL
Status: DISPENSED
Start: 2024-07-30

## (undated) RX ORDER — FENTANYL CITRATE 50 UG/ML
INJECTION, SOLUTION INTRAMUSCULAR; INTRAVENOUS
Status: DISPENSED
Start: 2024-12-06

## (undated) RX ORDER — FENTANYL CITRATE 50 UG/ML
INJECTION, SOLUTION INTRAMUSCULAR; INTRAVENOUS
Status: DISPENSED
Start: 2025-04-04

## (undated) RX ORDER — LIDOCAINE HYDROCHLORIDE 10 MG/ML
INJECTION, SOLUTION EPIDURAL; INFILTRATION; INTRACAUDAL; PERINEURAL
Status: DISPENSED
Start: 2024-07-30